# Patient Record
Sex: FEMALE | Race: WHITE
[De-identification: names, ages, dates, MRNs, and addresses within clinical notes are randomized per-mention and may not be internally consistent; named-entity substitution may affect disease eponyms.]

---

## 2018-09-27 ENCOUNTER — TRANSCRIPTION ENCOUNTER (OUTPATIENT)
Age: 83
End: 2018-09-27

## 2019-07-15 ENCOUNTER — INPATIENT (INPATIENT)
Facility: HOSPITAL | Age: 84
LOS: 6 days | Discharge: HOME HEALTH | End: 2019-07-22
Attending: INTERNAL MEDICINE | Admitting: INTERNAL MEDICINE
Payer: COMMERCIAL

## 2019-07-15 VITALS
OXYGEN SATURATION: 99 % | HEART RATE: 54 BPM | DIASTOLIC BLOOD PRESSURE: 90 MMHG | SYSTOLIC BLOOD PRESSURE: 106 MMHG | HEIGHT: 59 IN | TEMPERATURE: 98 F | WEIGHT: 100.09 LBS | RESPIRATION RATE: 18 BRPM

## 2019-07-15 DIAGNOSIS — Z95.0 PRESENCE OF CARDIAC PACEMAKER: Chronic | ICD-10-CM

## 2019-07-15 LAB
ADD ON TEST-SPECIMEN IN LAB: SIGNIFICANT CHANGE UP
ALBUMIN SERPL ELPH-MCNC: 3 G/DL — LOW (ref 3.3–5)
ALP SERPL-CCNC: 93 U/L — SIGNIFICANT CHANGE UP (ref 40–120)
ALT FLD-CCNC: 11 U/L — LOW (ref 12–78)
ANION GAP SERPL CALC-SCNC: 9 MMOL/L — SIGNIFICANT CHANGE UP (ref 5–17)
APTT BLD: 28.4 SEC — SIGNIFICANT CHANGE UP (ref 27.5–36.3)
AST SERPL-CCNC: 19 U/L — SIGNIFICANT CHANGE UP (ref 15–37)
BASOPHILS # BLD AUTO: 0.02 K/UL — SIGNIFICANT CHANGE UP (ref 0–0.2)
BASOPHILS NFR BLD AUTO: 0.4 % — SIGNIFICANT CHANGE UP (ref 0–2)
BILIRUB SERPL-MCNC: 0.5 MG/DL — SIGNIFICANT CHANGE UP (ref 0.2–1.2)
BUN SERPL-MCNC: 17 MG/DL — SIGNIFICANT CHANGE UP (ref 7–23)
CALCIUM SERPL-MCNC: 9 MG/DL — SIGNIFICANT CHANGE UP (ref 8.5–10.1)
CHLORIDE SERPL-SCNC: 109 MMOL/L — HIGH (ref 96–108)
CO2 SERPL-SCNC: 27 MMOL/L — SIGNIFICANT CHANGE UP (ref 22–31)
CREAT SERPL-MCNC: 1.04 MG/DL — SIGNIFICANT CHANGE UP (ref 0.5–1.3)
EOSINOPHIL # BLD AUTO: 0.1 K/UL — SIGNIFICANT CHANGE UP (ref 0–0.5)
EOSINOPHIL NFR BLD AUTO: 1.8 % — SIGNIFICANT CHANGE UP (ref 0–6)
GLUCOSE SERPL-MCNC: 120 MG/DL — HIGH (ref 70–99)
HCT VFR BLD CALC: 31.9 % — LOW (ref 34.5–45)
HGB BLD-MCNC: 9 G/DL — LOW (ref 11.5–15.5)
IMM GRANULOCYTES NFR BLD AUTO: 0.4 % — SIGNIFICANT CHANGE UP (ref 0–1.5)
INR BLD: 1.03 RATIO — SIGNIFICANT CHANGE UP (ref 0.88–1.16)
LYMPHOCYTES # BLD AUTO: 1.09 K/UL — SIGNIFICANT CHANGE UP (ref 1–3.3)
LYMPHOCYTES # BLD AUTO: 19.6 % — SIGNIFICANT CHANGE UP (ref 13–44)
MCHC RBC-ENTMCNC: 21.7 PG — LOW (ref 27–34)
MCHC RBC-ENTMCNC: 28.2 GM/DL — LOW (ref 32–36)
MCV RBC AUTO: 76.9 FL — LOW (ref 80–100)
MONOCYTES # BLD AUTO: 0.36 K/UL — SIGNIFICANT CHANGE UP (ref 0–0.9)
MONOCYTES NFR BLD AUTO: 6.5 % — SIGNIFICANT CHANGE UP (ref 2–14)
NEUTROPHILS # BLD AUTO: 3.97 K/UL — SIGNIFICANT CHANGE UP (ref 1.8–7.4)
NEUTROPHILS NFR BLD AUTO: 71.3 % — SIGNIFICANT CHANGE UP (ref 43–77)
NT-PROBNP SERPL-SCNC: HIGH PG/ML (ref 0–450)
PLATELET # BLD AUTO: 300 K/UL — SIGNIFICANT CHANGE UP (ref 150–400)
POTASSIUM SERPL-MCNC: 3.9 MMOL/L — SIGNIFICANT CHANGE UP (ref 3.5–5.3)
POTASSIUM SERPL-SCNC: 3.9 MMOL/L — SIGNIFICANT CHANGE UP (ref 3.5–5.3)
PROT SERPL-MCNC: 7.6 GM/DL — SIGNIFICANT CHANGE UP (ref 6–8.3)
PROTHROM AB SERPL-ACNC: 11.4 SEC — SIGNIFICANT CHANGE UP (ref 10–12.9)
RBC # BLD: 4.15 M/UL — SIGNIFICANT CHANGE UP (ref 3.8–5.2)
RBC # FLD: 16.6 % — HIGH (ref 10.3–14.5)
SODIUM SERPL-SCNC: 145 MMOL/L — SIGNIFICANT CHANGE UP (ref 135–145)
TROPONIN I SERPL-MCNC: 0.03 NG/ML — SIGNIFICANT CHANGE UP (ref 0.01–0.04)
TROPONIN I SERPL-MCNC: 0.04 NG/ML — SIGNIFICANT CHANGE UP (ref 0.01–0.04)
WBC # BLD: 5.56 K/UL — SIGNIFICANT CHANGE UP (ref 3.8–10.5)
WBC # FLD AUTO: 5.56 K/UL — SIGNIFICANT CHANGE UP (ref 3.8–10.5)

## 2019-07-15 PROCEDURE — 71045 X-RAY EXAM CHEST 1 VIEW: CPT | Mod: 26

## 2019-07-15 PROCEDURE — 93010 ELECTROCARDIOGRAM REPORT: CPT

## 2019-07-15 PROCEDURE — 99285 EMERGENCY DEPT VISIT HI MDM: CPT

## 2019-07-15 PROCEDURE — 71250 CT THORAX DX C-: CPT | Mod: 26

## 2019-07-15 RX ORDER — DIGOXIN 250 MCG
0.25 TABLET ORAL ONCE
Refills: 0 | Status: DISCONTINUED | OUTPATIENT
Start: 2019-07-15 | End: 2019-07-15

## 2019-07-15 RX ORDER — DIPHENHYDRAMINE HCL 50 MG
12.5 CAPSULE ORAL ONCE
Refills: 0 | Status: COMPLETED | OUTPATIENT
Start: 2019-07-15 | End: 2019-07-15

## 2019-07-15 RX ORDER — IPRATROPIUM BROMIDE 0.2 MG/ML
500 SOLUTION, NON-ORAL INHALATION EVERY 6 HOURS
Refills: 0 | Status: DISCONTINUED | OUTPATIENT
Start: 2019-07-15 | End: 2019-07-22

## 2019-07-15 RX ORDER — LEVALBUTEROL 1.25 MG/.5ML
0.63 SOLUTION, CONCENTRATE RESPIRATORY (INHALATION) EVERY 6 HOURS
Refills: 0 | Status: DISCONTINUED | OUTPATIENT
Start: 2019-07-15 | End: 2019-07-15

## 2019-07-15 RX ORDER — FUROSEMIDE 40 MG
40 TABLET ORAL EVERY 12 HOURS
Refills: 0 | Status: DISCONTINUED | OUTPATIENT
Start: 2019-07-15 | End: 2019-07-15

## 2019-07-15 RX ORDER — DILTIAZEM HCL 120 MG
5 CAPSULE, EXT RELEASE 24 HR ORAL ONCE
Refills: 0 | Status: COMPLETED | OUTPATIENT
Start: 2019-07-15 | End: 2019-07-15

## 2019-07-15 RX ORDER — IPRATROPIUM/ALBUTEROL SULFATE 18-103MCG
3 AEROSOL WITH ADAPTER (GRAM) INHALATION ONCE
Refills: 0 | Status: COMPLETED | OUTPATIENT
Start: 2019-07-15 | End: 2019-07-15

## 2019-07-15 RX ORDER — DIGOXIN 250 MCG
0.12 TABLET ORAL DAILY
Refills: 0 | Status: DISCONTINUED | OUTPATIENT
Start: 2019-07-15 | End: 2019-07-16

## 2019-07-15 RX ORDER — DIGOXIN 250 MCG
0.25 TABLET ORAL ONCE
Refills: 0 | Status: COMPLETED | OUTPATIENT
Start: 2019-07-15 | End: 2019-07-15

## 2019-07-15 RX ORDER — FUROSEMIDE 40 MG
40 TABLET ORAL ONCE
Refills: 0 | Status: COMPLETED | OUTPATIENT
Start: 2019-07-15 | End: 2019-07-15

## 2019-07-15 RX ORDER — SODIUM CHLORIDE 9 MG/ML
3 INJECTION INTRAMUSCULAR; INTRAVENOUS; SUBCUTANEOUS ONCE
Refills: 0 | Status: COMPLETED | OUTPATIENT
Start: 2019-07-15 | End: 2019-07-15

## 2019-07-15 RX ORDER — FUROSEMIDE 40 MG
40 TABLET ORAL EVERY 12 HOURS
Refills: 0 | Status: DISCONTINUED | OUTPATIENT
Start: 2019-07-15 | End: 2019-07-22

## 2019-07-15 RX ADMIN — SODIUM CHLORIDE 3 MILLILITER(S): 9 INJECTION INTRAMUSCULAR; INTRAVENOUS; SUBCUTANEOUS at 16:29

## 2019-07-15 RX ADMIN — Medication 3 MILLILITER(S): at 14:00

## 2019-07-15 RX ADMIN — Medication 0.25 MILLIGRAM(S): at 19:41

## 2019-07-15 RX ADMIN — Medication 12.5 MILLIGRAM(S): at 22:46

## 2019-07-15 RX ADMIN — Medication 500 MICROGRAM(S): at 21:37

## 2019-07-15 RX ADMIN — Medication 40 MILLIGRAM(S): at 15:20

## 2019-07-15 RX ADMIN — Medication 5 MILLIGRAM(S): at 17:33

## 2019-07-15 NOTE — ED ADULT NURSE NOTE - OBJECTIVE STATEMENT
Pt from home with baseline dementia brought in for difficulty breathing per family.  EKG done on arrival.   Cardiac and VS monitoring initiated.  22g PIV placed in RAC after multiple attempts.  Pt confused, uncooperative.  Pulse ox % on room air, nasal cannula placed.

## 2019-07-15 NOTE — ED ADULT NURSE NOTE - TEMPLATE
Message   Recorded as Task   Date: 03/02/2016 12:43 PM, Created By: Lali Walsh   Task Name: Follow Up   Assigned To: [facility],Team   Regarding Patient: Manny Jackson, Status: In Progress   Romelia Lawson - 02 Mar 1569 09:57 PM     TASK CREATED  S/P LESI L5-S1 ON 2/24/2016 W/ DR ROPER - NO F/U SCHEDULED   Dolores Howe - 04 Mar 2016 3:18 PM     TASK EDITED  s/w pt  today and she stated that she called yest with c/o pain and has received no relief with the epidural  She is taking the flexeril Qday, and the gabapentin 2 pills in am, 2 pills at lunch and 3 pills at bedtime  I reviewed the previous task which stated she could take the flexeril TID and no amrix  Just wanted to clarify that she can take the flexeril TID as needed for spasms  She states she will give the epidural another week but has no relief so far  Caden Roper - 04 Mar 2016 4:14 PM     TASK REPLIED TO: Previously Assigned To Karen Fonseca  Yes she can take it TID  We can schedule a f/u in 2 weeks since she's still have significant pain  Ed Nichole - 08 Mar 2016 12:56 PM     TASK REPLIED TO: Previously Assigned To [facility],Team  Spoke with pt and advised of the same  F/U ov scheduled with you 3-18-16  Pt asking is asking if there is another injection that you recommend? Or should she go back to see Dr Connolly Nearing? Caden Roper - 08 Mar 2016 2:41 PM     TASK REPLIED TO: Previously Assigned To Caden Roper  We can repeat LESI but it would 4 weeks after 1st injection  I would again prefer ov     Elinor Jose - 11 Mar 3310 3:23 AM     TASK EDITED  [facility] on [facility] - 11 Mar 4021 7:56 AM     TASK EDITED  Pt scheduled for OV        Signatures   Electronically signed by : Mark Walsh, ; Mar 11 2016  9:31AM EST                       (Author)
Respiratory

## 2019-07-15 NOTE — ED PROVIDER NOTE - PROGRESS NOTE DETAILS
will w/u for COPD/CHF. possible admission. ANDRES Reaves patient with large pleural effusion, CHF. Will admit. ANDRES Reaves

## 2019-07-15 NOTE — H&P ADULT - NSHPREVIEWOFSYSTEMS_GEN_ALL_CORE
Review of Systems:  CONSTITUTIONAL: No fevers or chills; Positive Weakness  EYES/ENT: No visual changes;  No vertigo or throat pain   NECK: No pain or stiffness  RESPIRATORY: No cough, wheezing, hemoptysis; Positive shortness of breath,   CARDIOVASCULAR: No chest pain or palpitations  GASTROINTESTINAL: No abdominal or epigastric pain. No nausea, vomiting, or hematemesis; No diarrhea or constipation.   GENITOURINARY: No dysuria, frequency or hematuria  NEUROLOGICAL: No numbness or weakness  SKIN: Positive for pruritis  All other review of systems is negative unless indicated above

## 2019-07-15 NOTE — H&P ADULT - NSHPPHYSICALEXAM_GEN_ALL_CORE
Vital Signs Last 24 Hrs  T(C): 36.4 (15 Jul 2019 12:59), Max: 36.4 (15 Jul 2019 12:59)  T(F): 97.6 (15 Jul 2019 12:59), Max: 97.6 (15 Jul 2019 12:59)  HR: 122 (15 Jul 2019 17:35) (54 - 122)  BP: 165/98 (15 Jul 2019 17:35) (106/90 - 165/98)  BP(mean): --  RR: 20 (15 Jul 2019 17:35) (18 - 24)  SpO2: 100% (15 Jul 2019 17:35) (96% - 100%)    General: Pt sitting upright in ED bed, no acute distress  Head- Atraumatic, normocephalic   Neurology: Alert, nonfocal,   HEENT- PERRLA, moist mucous membrane  Neck-supple, no JVD  Respiratory: Diminished air entry bilateral lung fields, no wheezing, rhonci or rales  CVS:  Irregularly irregular, tachycardic  Abdominal: Soft, NT, ND +BS,   Genitourinary- voiding, non palpable bladder  Extremities: 2+ Pitting edema bilateral lower extremities  Skin- multiple macules throughout body from scratching  Psychiatric- Poor insight and judgment  LN- no lymphadenopathy Vital Signs Last 24 Hrs  T(C): 36.4 (15 Jul 2019 12:59), Max: 36.4 (15 Jul 2019 12:59)  T(F): 97.6 (15 Jul 2019 12:59), Max: 97.6 (15 Jul 2019 12:59)  HR: 122 (15 Jul 2019 17:35) (54 - 122)  BP: 165/98 (15 Jul 2019 17:35) (106/90 - 165/98)  BP(mean): --  RR: 20 (15 Jul 2019 17:35) (18 - 24)  SpO2: 100% (15 Jul 2019 17:35) (96% - 100%)    General: Pt sitting upright in ED bed, no acute distress  Head- Atraumatic, normocephalic   Neurology: Alert, nonfocal,   HEENT- PERRLA, moist mucous membrane  Neck-supple, no JVD  Respiratory: Diminished air entry bilateral lung fields, no wheezing, rhonci or rales  CVS: Regular rhythm, tachycardic, no murmurs  Abdominal: Soft, NT, ND +BS,   Genitourinary- voiding, non palpable bladder  Extremities: 2+ Pitting edema bilateral lower extremities  Skin- multiple macules throughout body from scratching  Psychiatric- Poor insight and judgment  LN- no lymphadenopathy

## 2019-07-15 NOTE — H&P ADULT - HISTORY OF PRESENT ILLNESS
89 y/o F with PMH of asthma and PPM presents with c/o progressively worsening shortness of breath for past 2 days. History provided by 2 daughters via  phone service. Pt reportedly has severe asthma and was outside on Saturday for a birthday party. Pt does not do well in the heat and pt began complaining of worsening shortness of breath. Pt reportedly has had swelling of bilateral lower extremities, face and left breast. Pt has been seeing a dermatologist and allergist for many years for pruritic rash. Pt last saw her cardiologist of unknown name over 20 years ago for PPM. Daughter states that pt never needed a PPM and denies history of arrhythmias, AFIB, CHF. Daughter states that pt has no cardiac problems. Pt denies chest pain, nausea, vomiting, abdominal pain, diarrhea. 89 y/o F with PMH of asthma and PPM presents with c/o progressively worsening shortness of breath for past 2 days. History provided by 2 daughters via  phone service. Pt reportedly has severe asthma and was outside on Saturday for a birthday party. Pt does not do well in the heat and pt began complaining of worsening shortness of breath. Pt reportedly has had swelling of bilateral lower extremities, face and left breast. Pt has been seeing a dermatologist and allergist for many years for pruritic rash. Pt last saw her cardiologist couple months ago and states everything was normal. Pt reportedly had PPM placed over 20 years ago but daughter states it was never turned on as she didn't need one. Denies history of arrhythmias, AFIB, CHF. Daughter states that pt has no cardiac problems. Pt denies chest pain, nausea, vomiting, abdominal pain, diarrhea.    Daughters and patient are poor historians, unable to obtain accurate history due to discrepancies in reported history.

## 2019-07-15 NOTE — H&P ADULT - ASSESSMENT
89 y/o F with PMH of asthma, PPM presents with progressively worsening shortness of breath.    #) Acute respiratory failure 2/2 CHF: BNP 15k, no known hx of HF. Significant pleural effusions on CT and CXR. Hx of Asthma. Pt currently does not have a cardiologist. Troponin 0.035.  - Admit to Telemetry  - Lasix 40 mg IV qdaily  - Strict I&O  - Echocardiogram  - Trend troponins  - O2 NC  - Duo-Neb q6h  - Consult Cardiology, EP (Interrogate PM, not pacing currently, battery replacement)  - Consult Pulmonology: Pulmonary edema, large pleural effusions      #) AFIB RVR: Hx of PPM placed 20 years ago, has not followed up with Cardiologist since placement. ED administered Cardizem. Will also tx with diuretics which will likely improve rate.  - Rate control    #) Microcytic Anemia: Likely 2/2 KEHINDE  - Check Ferritin  - CBC in AM  - SCD for DVT ppx, hold A/C 89 y/o F with PMH of asthma, PPM presents with progressively worsening shortness of breath.    #) Acute respiratory failure 2/2 CHF: BNP 15k, no known hx of HF. Significant pleural effusions on CT and CXR. Hx of Asthma. Pt currently does not have a cardiologist. Troponin 0.035.  - Admit to Telemetry  - Lasix 40 mg IV qdaily  - Strict I&O  - Echocardiogram  - Trend troponins  - O2 NC  - Duo-Neb q6h  - Consult Cardiology, EP (Interrogate PM, not pacing currently, battery replacement)  - Consult Pulmonology: Pulmonary edema, large pleural effusions    #) AFIB RVR: Hx of PPM placed 20 years ago, has not followed up with Cardiologist since placement. ED administered Cardizem. Will also tx with diuretics which will likely improve rate. Goal HR < 110.  - Rate control: Start Metoprolol 25 mg BID    #) Microcytic Anemia: Likely 2/2 KEHINDE  - Check Ferritin  - CBC in AM  - SCD for DVT ppx, hold A/C 89 y/o F with PMH of asthma, PPM presents with progressively worsening shortness of breath.    #) Acute respiratory failure 2/2 CHF: BNP 15k, no known hx of HF. Significant pleural effusions on CT and CXR. Hx of Asthma. Pt currently does not have a cardiologist. Troponin 0.035.  - Admit to Telemetry  - Lasix 40 mg IV BID  - Strict I&O  - Echocardiogram  - Trend troponins  - O2 NC  - Duo-Neb q6h  - Consult Cardiology, EP (Interrogate PM, not pacing currently, battery replacement)  - Consult Pulmonology: Pulmonary edema, large pleural effusions    #) AFIB RVR: Hx of PPM placed 20 years ago, has not followed up with Cardiologist since placement. ED administered Cardizem. Will also tx with diuretics which will likely improve rate. Goal HR < 110.  - Will avoid initiating beta blockers in acute CHF setting.  - If no improvement with Diuresis, will start Digoxin.  - Continue home BP medications: Norvasc, Losartan    #) Microcytic Anemia: Likely 2/2 KEHINDE  - Check Ferritin, Iron, TIBC  - CBC in AM  - SCD for DVT ppx, hold A/C 89 y/o F with PMH of asthma, PPM presents with progressively worsening shortness of breath.    #) Acute respiratory failure 2/2 CHF: BNP 15k, no known hx of HF. Significant pleural effusions on CT and CXR. Hx of Asthma. Pt currently does not have a cardiologist. Troponin 0.035.  - Admit to Telemetry  - Lasix 40 mg IV BID  - Strict I&O  - Echocardiogram  - Trend troponins  - O2 NC  - Duo-Neb q6h  - Consult Cardiology, EP (Interrogate PM, not pacing currently, battery replacement)  - Consult Pulmonology: Pulmonary edema, large pleural effusions    #) Sinus Tachycardia: Hx of PPM placed 20 years ago. ED administered Cardizem. Will also tx with diuretics which will likely improve rate. Tachy likely 2/2 significant pleural effusion and hypervolemia.  - Will avoid initiating beta blockers in acute CHF setting.  - Will start Digoxin. Potassium level normal.  - Continue home BP medications: Norvasc, Losartan    #) Microcytic Anemia: Likely 2/2 KEHINDE  - Check Ferritin, Iron, TIBC  - CBC in AM  - SCD for DVT ppx, hold A/C

## 2019-07-15 NOTE — ED ADULT TRIAGE NOTE - ACCOMPANIED BY
Epidural    Patient location during procedure: OB   Reason for block: primary anesthetic   Diagnosis: iup   Start time: 8/2/2017 6:19 PM  Timeout: 8/2/2017 6:17 PM  End time: 8/3/2017 6:38 PM  Staffing  Anesthesiologist: MARIO CONLEY  Performed: anesthesiologist   Preanesthetic Checklist  Completed: patient identified, pre-op evaluation, timeout performed, IV checked, risks and benefits discussed, monitors and equipment checked, anesthesia consent given, hand hygiene performed and patient being monitored  Preparation  Patient position: sitting  Prep: Betadine  Patient monitoring: Pulse Ox and Blood Pressure  Epidural  Skin Anesthetic: lidocaine 1%  Skin Wheal: 3 mL  Administration type: continuous  Approach: midline  Interspace: L3-4  Injection technique: CHANDRA air  Needle and Epidural Catheter  Needle type: Tuohy   Needle gauge: 18  Needle length: 3.5 inches  Catheter type: multi-orifice  Catheter size: 20 G  Catheter at skin depth: 9 cm  Additional Documentation: incremental injection, negative aspiration for heme and CSF, no signs/symptoms of IV or SA injection, no paresthesia on injection, no significant pain on injection and no significant complaints from patient  Needle localization: anatomical landmarks  Medications:  Bolus administered: 12 mL of 0.2% ropivacaine  Volume per aspiration: 3 mL  Time between aspirations: 1 minutes  Assessment  Ease of block: easy  Patient's tolerance of the procedure: comfortable throughout block           EMT/paramedic

## 2019-07-15 NOTE — ED ADULT NURSE REASSESSMENT NOTE - NS ED NURSE REASSESS COMMENT FT1
Updated  regarding patient's HR, MD will place orders. Safety and comfort measures in place. Hourly rounding will be done on my time. Will continue to monitor.

## 2019-07-15 NOTE — ED ADULT TRIAGE NOTE - CHIEF COMPLAINT QUOTE
Pt BIBEMS with difficulty breathing, shortness of breath, yelling on stretcher with daughter in law at bedside who states she has dementia, speaks only [place]. Pt in mild-moderate respiratory distress and transferred to bed 16 for further assessment.

## 2019-07-16 DIAGNOSIS — J90 PLEURAL EFFUSION, NOT ELSEWHERE CLASSIFIED: ICD-10-CM

## 2019-07-16 DIAGNOSIS — J45.909 UNSPECIFIED ASTHMA, UNCOMPLICATED: ICD-10-CM

## 2019-07-16 DIAGNOSIS — R06.02 SHORTNESS OF BREATH: ICD-10-CM

## 2019-07-16 DIAGNOSIS — I50.9 HEART FAILURE, UNSPECIFIED: ICD-10-CM

## 2019-07-16 DIAGNOSIS — Z95.0 PRESENCE OF CARDIAC PACEMAKER: ICD-10-CM

## 2019-07-16 LAB
ABO RH CONFIRMATION: SIGNIFICANT CHANGE UP
ADD ON TEST-SPECIMEN IN LAB: SIGNIFICANT CHANGE UP
ANION GAP SERPL CALC-SCNC: 7 MMOL/L — SIGNIFICANT CHANGE UP (ref 5–17)
BLD GP AB SCN SERPL QL: SIGNIFICANT CHANGE UP
BUN SERPL-MCNC: 15 MG/DL — SIGNIFICANT CHANGE UP (ref 7–23)
CALCIUM SERPL-MCNC: 8.1 MG/DL — LOW (ref 8.5–10.1)
CHLORIDE SERPL-SCNC: 108 MMOL/L — SIGNIFICANT CHANGE UP (ref 96–108)
CO2 SERPL-SCNC: 28 MMOL/L — SIGNIFICANT CHANGE UP (ref 22–31)
CREAT SERPL-MCNC: 0.86 MG/DL — SIGNIFICANT CHANGE UP (ref 0.5–1.3)
FERRITIN SERPL-MCNC: 18 NG/ML — SIGNIFICANT CHANGE UP (ref 15–150)
FOLATE SERPL-MCNC: >20 NG/ML — SIGNIFICANT CHANGE UP
GLUCOSE SERPL-MCNC: 84 MG/DL — SIGNIFICANT CHANGE UP (ref 70–99)
HAPTOGLOB SERPL-MCNC: 51 MG/DL — SIGNIFICANT CHANGE UP (ref 34–200)
HCT VFR BLD CALC: 22.9 % — LOW (ref 34.5–45)
HCT VFR BLD CALC: 22.9 % — LOW (ref 34.5–45)
HCT VFR BLD CALC: 29.4 % — LOW (ref 34.5–45)
HGB BLD-MCNC: 6.8 G/DL — CRITICAL LOW (ref 11.5–15.5)
HGB BLD-MCNC: 6.8 G/DL — CRITICAL LOW (ref 11.5–15.5)
HGB BLD-MCNC: 9 G/DL — LOW (ref 11.5–15.5)
IRON SATN MFR SERPL: 13 UG/DL — LOW (ref 30–160)
IRON SATN MFR SERPL: 4 % — LOW (ref 14–50)
MCHC RBC-ENTMCNC: 21.7 PG — LOW (ref 27–34)
MCHC RBC-ENTMCNC: 29.7 GM/DL — LOW (ref 32–36)
MCV RBC AUTO: 73.2 FL — LOW (ref 80–100)
PLATELET # BLD AUTO: 281 K/UL — SIGNIFICANT CHANGE UP (ref 150–400)
POTASSIUM SERPL-MCNC: 3.5 MMOL/L — SIGNIFICANT CHANGE UP (ref 3.5–5.3)
POTASSIUM SERPL-SCNC: 3.5 MMOL/L — SIGNIFICANT CHANGE UP (ref 3.5–5.3)
RBC # BLD: 3.13 M/UL — LOW (ref 3.8–5.2)
RBC # FLD: 16 % — HIGH (ref 10.3–14.5)
SODIUM SERPL-SCNC: 143 MMOL/L — SIGNIFICANT CHANGE UP (ref 135–145)
TIBC SERPL-MCNC: 306 UG/DL — SIGNIFICANT CHANGE UP (ref 220–430)
TROPONIN I SERPL-MCNC: 0.03 NG/ML — SIGNIFICANT CHANGE UP (ref 0.01–0.04)
TSH SERPL-MCNC: 4.04 UU/ML — SIGNIFICANT CHANGE UP (ref 0.34–4.82)
TYPE + AB SCN PNL BLD: SIGNIFICANT CHANGE UP
UIBC SERPL-MCNC: 293 UG/DL — SIGNIFICANT CHANGE UP (ref 110–370)
VIT B12 SERPL-MCNC: 373 PG/ML — SIGNIFICANT CHANGE UP (ref 232–1245)
WBC # BLD: 6.12 K/UL — SIGNIFICANT CHANGE UP (ref 3.8–10.5)
WBC # FLD AUTO: 6.12 K/UL — SIGNIFICANT CHANGE UP (ref 3.8–10.5)

## 2019-07-16 PROCEDURE — 99223 1ST HOSP IP/OBS HIGH 75: CPT

## 2019-07-16 PROCEDURE — 93279 PRGRMG DEV EVAL PM/LDLS PM: CPT | Mod: 26

## 2019-07-16 PROCEDURE — 74176 CT ABD & PELVIS W/O CONTRAST: CPT | Mod: 26

## 2019-07-16 PROCEDURE — 93306 TTE W/DOPPLER COMPLETE: CPT | Mod: 26

## 2019-07-16 RX ORDER — TRAZODONE HCL 50 MG
50 TABLET ORAL AT BEDTIME
Refills: 0 | Status: DISCONTINUED | OUTPATIENT
Start: 2019-07-16 | End: 2019-07-22

## 2019-07-16 RX ORDER — DIPHENHYDRAMINE HCL 50 MG
12.5 CAPSULE ORAL ONCE
Refills: 0 | Status: COMPLETED | OUTPATIENT
Start: 2019-07-16 | End: 2019-07-16

## 2019-07-16 RX ORDER — HYDROCORTISONE 1 %
1 OINTMENT (GRAM) TOPICAL ONCE
Refills: 0 | Status: COMPLETED | OUTPATIENT
Start: 2019-07-16 | End: 2019-07-16

## 2019-07-16 RX ORDER — HYDROXYZINE HCL 10 MG
10 TABLET ORAL
Refills: 0 | Status: DISCONTINUED | OUTPATIENT
Start: 2019-07-16 | End: 2019-07-16

## 2019-07-16 RX ORDER — PANTOPRAZOLE SODIUM 20 MG/1
40 TABLET, DELAYED RELEASE ORAL
Refills: 0 | Status: DISCONTINUED | OUTPATIENT
Start: 2019-07-16 | End: 2019-07-22

## 2019-07-16 RX ORDER — METOPROLOL TARTRATE 50 MG
12.5 TABLET ORAL EVERY 12 HOURS
Refills: 0 | Status: DISCONTINUED | OUTPATIENT
Start: 2019-07-16 | End: 2019-07-17

## 2019-07-16 RX ORDER — SODIUM FERRIC GLUCONAT/SUCROSE 62.5MG/5ML
125 AMPUL (ML) INTRAVENOUS
Refills: 0 | Status: DISCONTINUED | OUTPATIENT
Start: 2019-07-16 | End: 2019-07-22

## 2019-07-16 RX ORDER — MONTELUKAST 4 MG/1
10 TABLET, CHEWABLE ORAL DAILY
Refills: 0 | Status: DISCONTINUED | OUTPATIENT
Start: 2019-07-16 | End: 2019-07-22

## 2019-07-16 RX ADMIN — Medication 600 MILLIGRAM(S): at 17:55

## 2019-07-16 RX ADMIN — Medication 40 MILLIGRAM(S): at 17:55

## 2019-07-16 RX ADMIN — Medication 12.5 MILLIGRAM(S): at 03:25

## 2019-07-16 RX ADMIN — Medication 500 MICROGRAM(S): at 08:35

## 2019-07-16 RX ADMIN — Medication 500 MICROGRAM(S): at 13:17

## 2019-07-16 RX ADMIN — Medication 1 APPLICATION(S): at 04:52

## 2019-07-16 RX ADMIN — Medication 500 MICROGRAM(S): at 20:33

## 2019-07-16 RX ADMIN — Medication 0.12 MILLIGRAM(S): at 05:02

## 2019-07-16 RX ADMIN — PANTOPRAZOLE SODIUM 40 MILLIGRAM(S): 20 TABLET, DELAYED RELEASE ORAL at 21:21

## 2019-07-16 RX ADMIN — Medication 12.5 MILLIGRAM(S): at 21:22

## 2019-07-16 RX ADMIN — Medication 500 MICROGRAM(S): at 01:43

## 2019-07-16 RX ADMIN — PANTOPRAZOLE SODIUM 40 MILLIGRAM(S): 20 TABLET, DELAYED RELEASE ORAL at 14:33

## 2019-07-16 RX ADMIN — Medication 50 MILLIGRAM(S): at 21:22

## 2019-07-16 RX ADMIN — Medication 40 MILLIGRAM(S): at 05:02

## 2019-07-16 RX ADMIN — Medication 12.5 MILLIGRAM(S): at 14:34

## 2019-07-16 RX ADMIN — Medication 110 MILLIGRAM(S): at 20:38

## 2019-07-16 RX ADMIN — MONTELUKAST 10 MILLIGRAM(S): 4 TABLET, CHEWABLE ORAL at 21:22

## 2019-07-16 NOTE — DIETITIAN INITIAL EVALUATION ADULT. - PHYSICAL APPEARANCE
other (specify)/underweight NFPE: Severe muscle wasting in temple, clavicle, Deltoid, interosseous, calves and quads. Pt with severe fat wasting in ribs, occular and  triceps.

## 2019-07-16 NOTE — DIETITIAN INITIAL EVALUATION ADULT. - PERTINENT LABORATORY DATA
07-16 Na143 mmol/L Glu 84 mg/dL K+ 3.5 mmol/L Cr  0.86 mg/dL BUN 15 mg/dL Phos n/a   Alb n/a   PAB n/a

## 2019-07-16 NOTE — PHYSICAL THERAPY INITIAL EVALUATION ADULT - AMBULATION SKILLS, REHAB EVAL
unable to perform/As per dtr Jazzy pt has stopped amb since couple months, pt does only transfers with 2person assist from bed to  and rw

## 2019-07-16 NOTE — PHYSICAL THERAPY INITIAL EVALUATION ADULT - FOLLOWS COMMANDS/ANSWERS QUESTIONS, REHAB EVAL
able to follow single-step instructions able to follow single-step instructions/unable to follow multi-step instructions/unable to answer questions

## 2019-07-16 NOTE — DIETITIAN INITIAL EVALUATION ADULT. - DIET TYPE
Diet, Dysphagia 2 Mechanical Soft-Thin Liquids:   DASH/TLC {Sodium & Cholesterol Restricted} (DASH)  Low Sodium (07-15-19 @ 15:39) supplement (specify)/DASH/TLC (sodium and cholesterol restricted diet)/Ensure Enlive BID

## 2019-07-16 NOTE — CONSULT NOTE ADULT - PROBLEM SELECTOR RECOMMENDATION 2
moderate to large left pleural , small right pleural effusion ? diastolic CHF vs  will give diuretic , follow up CXR

## 2019-07-16 NOTE — DIETITIAN INITIAL EVALUATION ADULT. - ENERGY INTAKE
Pt's intake is difficult to assess. Pt's niece states that pt eats very well and usually eats three meals a day with snacks and good portions. Pt is noted as underweight and has clear signs of muscle and fat wasting. Pt likely not meeting needs. Of not niece blamed weight loss on poor sleep.

## 2019-07-16 NOTE — PHYSICAL THERAPY INITIAL EVALUATION ADULT - PLANNED THERAPY INTERVENTIONS, PT EVAL
transfer training/strengthening/gait training/bed mobility training/wheelchair management/propulsion training

## 2019-07-16 NOTE — DIETITIAN INITIAL EVALUATION ADULT. - REASON INDICATOR FOR ASSESSMENT
Heart Failure Diet educations    Interpretive service used ID # [id number]- Call dropped multiple times due to poor service on interpretive services part. Most information provided

## 2019-07-16 NOTE — CONSULT NOTE ADULT - ASSESSMENT
L greater than R pleural effusions secondary to CHF.  NC O2.  Note decrease in Hb.   Would hold on thoracentesis at this time.     h.o. asthma. not wheezing.  Montelukast, ipratropium nebs.     h.o. PM placement.

## 2019-07-16 NOTE — CHART NOTE - NSCHARTNOTEFT_GEN_A_CORE
Upon Nutritional Assessment by the Registered Dietitian your patient was determined to meet criteria / has evidence of the following diagnosis/diagnoses:          [ ]  Mild Protein Calorie Malnutrition        [ ]  Moderate Protein Calorie Malnutrition        [x] Severe Protein Calorie Malnutrition        [ ] Unspecified Protein Calorie Malnutrition        [x] Underweight / BMI <19        [ ] Morbid Obesity / BMI > 40      Findings as based on:  •  Comprehensive nutrition assessment and consultation  •  Calorie counts (nutrient intake analysis)  •  Food acceptance and intake status from observations by staff  •  Follow up  •  Patient education  •  Intervention secondary to interdisciplinary rounds  •   concerns    Nutrition Diagnostic Terminology #1 Malnutrition...     Malnutrition Severe protein-calorie malnutrition in the context of chronic illness.     Etiology Poor PO intake 2/2 CHF.     Signs/Symptoms Severe muscle and fat wasting.     Goal/Expected Outcome Pt will complete > 75% at each meal and drink supplement.        Treatment:    The following diet has been recommended:  -Encourage Po intake  -Monitor weight  -Ensure enlive BID    PROVIDER Section:     By signing this assessment you are acknowledging and agree with the diagnosis/diagnoses assigned by the Registered Dietitian    Comments:

## 2019-07-16 NOTE — PHYSICAL THERAPY INITIAL EVALUATION ADULT - CRITERIA FOR SKILLED THERAPEUTIC INTERVENTIONS
therapy frequency/impairments found/rehab potential/functional limitations in following categories/predicted duration of therapy intervention/anticipated equipment needs at discharge/risk reduction/prevention/anticipated discharge recommendation

## 2019-07-16 NOTE — DIETITIAN INITIAL EVALUATION ADULT. - OTHER INFO
89 y/o F with PMH of asthma and PPM presents with c/o progressively worsening shortness of breath for past 2 days. History provided by 2 daughters via  phone service. Pt reportedly has severe asthma and was outside on Saturday for a birthday party. Pt does not do well in the heat and pt began complaining of worsening shortness of breath. Pt reportedly has had swelling of bilateral lower extremities, face and left breast. Pt has been seeing a dermatologist and allergist for many years for pruritic rash. Pt last saw her cardiologist couple months ago and states everything was normal.    Pt's niece provided information on diet h/x. Niece states pt has a great appetite and typically eats [place] food notes pt eats three times a day. Niece states she thinks pt has lived so long due to good intake. When probed niece states pt has lost a lot of weight over the past years. Pt noted as underweight based on BMI. NFPE noted below but clear signs of muscle and fat wasting. Information slightly conflicting.

## 2019-07-16 NOTE — DIETITIAN INITIAL EVALUATION ADULT. - PERTINENT MEDS FT
MEDICATIONS  (STANDING):  furosemide   Injectable 40 milliGRAM(s) IV Push every 12 hours  guaiFENesin  milliGRAM(s) Oral every 12 hours  hydroxyzine Hcl 10 milliGRAM(s) 10 milliGRAM(s) Oral two times a day  ipratropium    for Nebulization 500 MICROGram(s) Nebulizer every 6 hours  metoprolol tartrate 12.5 milliGRAM(s) Oral every 12 hours  montelukast 10 milliGRAM(s) Oral daily  pantoprazole  Injectable 40 milliGRAM(s) IV Push two times a day  traZODone 50 milliGRAM(s) Oral at bedtime    MEDICATIONS  (PRN):

## 2019-07-16 NOTE — CONSULT NOTE ADULT - PROBLEM SELECTOR RECOMMENDATION 3
hx of PPM , appears ? vasovagal episode vs pause , never been used based on interrogations over long period , last 2 years ago ,

## 2019-07-16 NOTE — DIETITIAN INITIAL EVALUATION ADULT. - ADD RECOMMEND
Recommend Ensure enlive BID, Encourage PO intake, Monitor wt. Will continue ot monitor pt's nutritional status

## 2019-07-16 NOTE — PHYSICAL THERAPY INITIAL EVALUATION ADULT - PERTINENT HX OF CURRENT PROBLEM, REHAB EVAL
Pt presents with c/o progressively worsening shortness of breath for past 2 days. Pt reportedly has severe asthma and was outside on Saturday for a birthday party. Pt does not do well in the heat and pt began complaining of worsening shortness of breath. Pt reportedly has had swelling of bilateral lower extremities, face and left breast. Pt has been seeing a dermatologist and allergist for many years for pruritic rash.,

## 2019-07-16 NOTE — PHYSICAL THERAPY INITIAL EVALUATION ADULT - IMPAIRMENTS FOUND, PT EVAL
gross motor/muscle strength/arousal, attention, and cognition/cognitive impairment/gait, locomotion, and balance

## 2019-07-16 NOTE — CONSULT NOTE ADULT - PROBLEM SELECTOR RECOMMENDATION 4
acute on chronic diastolic heart failure ,tachycardia on EKG possible due to anxiety and CHF , anemia? low dose BB as she is not wheezing ,

## 2019-07-16 NOTE — CONSULT NOTE ADULT - PROBLEM SELECTOR RECOMMENDATION 9
possibly multifactorial  bilateral asymmetric pleural effusion left more than right ,? CHF  ? asthma ,  anemia .    continue IV DIuresis , singulair ,  Transfuse  , anemia work up   echo showed normal LVEF , LVH

## 2019-07-17 LAB
ALBUMIN SERPL ELPH-MCNC: 2.1 G/DL — LOW (ref 3.3–5)
ANION GAP SERPL CALC-SCNC: 5 MMOL/L — SIGNIFICANT CHANGE UP (ref 5–17)
BUN SERPL-MCNC: 13 MG/DL — SIGNIFICANT CHANGE UP (ref 7–23)
CALCIUM SERPL-MCNC: 7.6 MG/DL — LOW (ref 8.5–10.1)
CHLORIDE SERPL-SCNC: 107 MMOL/L — SIGNIFICANT CHANGE UP (ref 96–108)
CO2 SERPL-SCNC: 33 MMOL/L — HIGH (ref 22–31)
CREAT SERPL-MCNC: 0.84 MG/DL — SIGNIFICANT CHANGE UP (ref 0.5–1.3)
GLUCOSE SERPL-MCNC: 77 MG/DL — SIGNIFICANT CHANGE UP (ref 70–99)
HCT VFR BLD CALC: 27.1 % — LOW (ref 34.5–45)
HGB BLD-MCNC: 8.4 G/DL — LOW (ref 11.5–15.5)
MCHC RBC-ENTMCNC: 22.5 PG — LOW (ref 27–34)
MCHC RBC-ENTMCNC: 31 GM/DL — LOW (ref 32–36)
MCV RBC AUTO: 72.5 FL — LOW (ref 80–100)
PHOSPHATE SERPL-MCNC: 3.1 MG/DL — SIGNIFICANT CHANGE UP (ref 2.5–4.5)
PLATELET # BLD AUTO: 257 K/UL — SIGNIFICANT CHANGE UP (ref 150–400)
POTASSIUM SERPL-MCNC: 3.2 MMOL/L — LOW (ref 3.5–5.3)
POTASSIUM SERPL-SCNC: 3.2 MMOL/L — LOW (ref 3.5–5.3)
RBC # BLD: 3.74 M/UL — LOW (ref 3.8–5.2)
RBC # FLD: 16.5 % — HIGH (ref 10.3–14.5)
SODIUM SERPL-SCNC: 145 MMOL/L — SIGNIFICANT CHANGE UP (ref 135–145)
WBC # BLD: 5.64 K/UL — SIGNIFICANT CHANGE UP (ref 3.8–10.5)
WBC # FLD AUTO: 5.64 K/UL — SIGNIFICANT CHANGE UP (ref 3.8–10.5)

## 2019-07-17 PROCEDURE — 99233 SBSQ HOSP IP/OBS HIGH 50: CPT

## 2019-07-17 RX ORDER — LOSARTAN POTASSIUM 100 MG/1
1 TABLET, FILM COATED ORAL
Qty: 0 | Refills: 0 | DISCHARGE

## 2019-07-17 RX ORDER — MOMETASONE FUROATE 1 MG/G
1 CREAM TOPICAL
Qty: 0 | Refills: 0 | DISCHARGE

## 2019-07-17 RX ORDER — POTASSIUM CHLORIDE 20 MEQ
40 PACKET (EA) ORAL EVERY 4 HOURS
Refills: 0 | Status: COMPLETED | OUTPATIENT
Start: 2019-07-17 | End: 2019-07-17

## 2019-07-17 RX ORDER — AMLODIPINE BESYLATE 2.5 MG/1
1 TABLET ORAL
Qty: 0 | Refills: 0 | DISCHARGE

## 2019-07-17 RX ORDER — QUETIAPINE FUMARATE 200 MG/1
1 TABLET, FILM COATED ORAL
Qty: 0 | Refills: 0 | DISCHARGE

## 2019-07-17 RX ORDER — METOPROLOL TARTRATE 50 MG
25 TABLET ORAL
Refills: 0 | Status: DISCONTINUED | OUTPATIENT
Start: 2019-07-17 | End: 2019-07-22

## 2019-07-17 RX ADMIN — Medication 25 MILLIGRAM(S): at 18:09

## 2019-07-17 RX ADMIN — Medication 12.5 MILLIGRAM(S): at 05:06

## 2019-07-17 RX ADMIN — Medication 40 MILLIGRAM(S): at 05:05

## 2019-07-17 RX ADMIN — PANTOPRAZOLE SODIUM 40 MILLIGRAM(S): 20 TABLET, DELAYED RELEASE ORAL at 18:09

## 2019-07-17 RX ADMIN — PANTOPRAZOLE SODIUM 40 MILLIGRAM(S): 20 TABLET, DELAYED RELEASE ORAL at 05:05

## 2019-07-17 RX ADMIN — Medication 40 MILLIEQUIVALENT(S): at 18:04

## 2019-07-17 RX ADMIN — MONTELUKAST 10 MILLIGRAM(S): 4 TABLET, CHEWABLE ORAL at 22:16

## 2019-07-17 RX ADMIN — Medication 500 MICROGRAM(S): at 13:46

## 2019-07-17 RX ADMIN — Medication 50 MILLIGRAM(S): at 22:16

## 2019-07-17 RX ADMIN — Medication 40 MILLIEQUIVALENT(S): at 12:31

## 2019-07-17 RX ADMIN — Medication 600 MILLIGRAM(S): at 05:05

## 2019-07-17 RX ADMIN — Medication 110 MILLIGRAM(S): at 21:19

## 2019-07-17 RX ADMIN — Medication 40 MILLIGRAM(S): at 18:09

## 2019-07-17 RX ADMIN — Medication 500 MICROGRAM(S): at 20:03

## 2019-07-17 RX ADMIN — Medication 500 MICROGRAM(S): at 07:54

## 2019-07-17 RX ADMIN — Medication 600 MILLIGRAM(S): at 18:04

## 2019-07-17 NOTE — PROVIDER CONTACT NOTE (OTHER) - SITUATION
Consult left with Dr. Mukherjee's answering service.
Consult left with Dr. Sanches's answering service.
spoke to  [id number] service is aware
spoke with Carlyn. aware of consult
spoke with Jonathan in the answering service. aware of consult

## 2019-07-17 NOTE — PROGRESS NOTE ADULT - PROBLEM SELECTOR PLAN 1
Possibly related to diastolic failure. Underlying anemia now improved with transfusion. Follow CBC. Hospitalist evaluation. Continue diuresis. Follow labs.

## 2019-07-17 NOTE — CHART NOTE - NSCHARTNOTEFT_GEN_A_CORE
Patient brought to [facility] for diagnostic and therapeutic L thoracentesis. Despite our best efforts, the patient would no cooperate for the procedure, posing a harm to herself and others with her behavior.     Procedure terminates without thoracentesis.     d/w Dr. Perez and Dr. Dodson

## 2019-07-18 LAB
ANION GAP SERPL CALC-SCNC: 7 MMOL/L — SIGNIFICANT CHANGE UP (ref 5–17)
BUN SERPL-MCNC: 12 MG/DL — SIGNIFICANT CHANGE UP (ref 7–23)
CALCIUM SERPL-MCNC: 8.1 MG/DL — LOW (ref 8.5–10.1)
CHLORIDE SERPL-SCNC: 106 MMOL/L — SIGNIFICANT CHANGE UP (ref 96–108)
CO2 SERPL-SCNC: 31 MMOL/L — SIGNIFICANT CHANGE UP (ref 22–31)
CREAT SERPL-MCNC: 0.88 MG/DL — SIGNIFICANT CHANGE UP (ref 0.5–1.3)
GLUCOSE SERPL-MCNC: 78 MG/DL — SIGNIFICANT CHANGE UP (ref 70–99)
HCT VFR BLD CALC: 27.9 % — LOW (ref 34.5–45)
HGB BLD-MCNC: 8.5 G/DL — LOW (ref 11.5–15.5)
MCHC RBC-ENTMCNC: 22.3 PG — LOW (ref 27–34)
MCHC RBC-ENTMCNC: 30.5 GM/DL — LOW (ref 32–36)
MCV RBC AUTO: 73 FL — LOW (ref 80–100)
OB PNL STL: NEGATIVE — SIGNIFICANT CHANGE UP
PLATELET # BLD AUTO: 257 K/UL — SIGNIFICANT CHANGE UP (ref 150–400)
POTASSIUM SERPL-MCNC: 3.8 MMOL/L — SIGNIFICANT CHANGE UP (ref 3.5–5.3)
POTASSIUM SERPL-SCNC: 3.8 MMOL/L — SIGNIFICANT CHANGE UP (ref 3.5–5.3)
RBC # BLD: 3.82 M/UL — SIGNIFICANT CHANGE UP (ref 3.8–5.2)
RBC # FLD: 16.9 % — HIGH (ref 10.3–14.5)
SODIUM SERPL-SCNC: 144 MMOL/L — SIGNIFICANT CHANGE UP (ref 135–145)
WBC # BLD: 6.23 K/UL — SIGNIFICANT CHANGE UP (ref 3.8–10.5)
WBC # FLD AUTO: 6.23 K/UL — SIGNIFICANT CHANGE UP (ref 3.8–10.5)

## 2019-07-18 PROCEDURE — 99233 SBSQ HOSP IP/OBS HIGH 50: CPT

## 2019-07-18 RX ORDER — POLYETHYLENE GLYCOL 3350 17 G/17G
17 POWDER, FOR SOLUTION ORAL DAILY
Refills: 0 | Status: DISCONTINUED | OUTPATIENT
Start: 2019-07-18 | End: 2019-07-22

## 2019-07-18 RX ORDER — SOD,AMMONIUM,POTASSIUM LACTATE
1 CREAM (GRAM) TOPICAL
Refills: 0 | Status: DISCONTINUED | OUTPATIENT
Start: 2019-07-18 | End: 2019-07-22

## 2019-07-18 RX ORDER — PREGABALIN 225 MG/1
1000 CAPSULE ORAL DAILY
Refills: 0 | Status: DISCONTINUED | OUTPATIENT
Start: 2019-07-18 | End: 2019-07-22

## 2019-07-18 RX ORDER — SENNA PLUS 8.6 MG/1
2 TABLET ORAL AT BEDTIME
Refills: 0 | Status: DISCONTINUED | OUTPATIENT
Start: 2019-07-18 | End: 2019-07-22

## 2019-07-18 RX ADMIN — Medication 1 APPLICATION(S): at 18:39

## 2019-07-18 RX ADMIN — Medication 600 MILLIGRAM(S): at 05:52

## 2019-07-18 RX ADMIN — PANTOPRAZOLE SODIUM 40 MILLIGRAM(S): 20 TABLET, DELAYED RELEASE ORAL at 22:03

## 2019-07-18 RX ADMIN — Medication 25 MILLIGRAM(S): at 05:52

## 2019-07-18 RX ADMIN — Medication 500 MICROGRAM(S): at 20:38

## 2019-07-18 RX ADMIN — Medication 600 MILLIGRAM(S): at 18:39

## 2019-07-18 RX ADMIN — SENNA PLUS 2 TABLET(S): 8.6 TABLET ORAL at 22:03

## 2019-07-18 RX ADMIN — Medication 50 MILLIGRAM(S): at 22:03

## 2019-07-18 RX ADMIN — Medication 110 MILLIGRAM(S): at 22:03

## 2019-07-18 RX ADMIN — Medication 40 MILLIGRAM(S): at 18:39

## 2019-07-18 RX ADMIN — Medication 25 MILLIGRAM(S): at 18:40

## 2019-07-18 RX ADMIN — Medication 500 MICROGRAM(S): at 08:42

## 2019-07-18 RX ADMIN — Medication 40 MILLIGRAM(S): at 12:12

## 2019-07-18 RX ADMIN — PANTOPRAZOLE SODIUM 40 MILLIGRAM(S): 20 TABLET, DELAYED RELEASE ORAL at 12:13

## 2019-07-18 RX ADMIN — Medication 500 MICROGRAM(S): at 13:48

## 2019-07-18 RX ADMIN — MONTELUKAST 10 MILLIGRAM(S): 4 TABLET, CHEWABLE ORAL at 12:13

## 2019-07-18 NOTE — PROGRESS NOTE ADULT - PROBLEM SELECTOR PLAN 1
Possibly related to diastolic failure. Underlying anemia now improved . Follow CBC. Hospitalist evaluation. Ambulate as tolerated.

## 2019-07-18 NOTE — CONSULT NOTE ADULT - ASSESSMENT
89F with significant microcytic anemia, without overt bleeding (hgb 6.8).  Poor appetite and weight loss. Negative noncontrast CT a/p.  Admitted also with pleural effusion, diastolic CHF.    Recommend:  -trend CBC post transfusion  -check FOBT  -management of cardiopulmonary issues per respective services  -consider evaluation with at least EGD but patient currently not optimized for endoscopic testing; would probably also need colonoscopy to fully eval anemia and rule out colon cancer  -address GOC given frailty and multiple issues, may wish to favor conservative measures such as iron supplementation instead of endoscopy  -d/w daughter at bedside  -will follow.

## 2019-07-18 NOTE — CONSULT NOTE ADULT - SUBJECTIVE AND OBJECTIVE BOX
ppor historian  history from chart        CHIEF COMPLAINT:    HPI:  89 y/o F with PMH of asthma HTN and ? tachyarrhythmia  PPM for ?presents with c/o progressively worsening shortness of breath for past 2 days. History provided by daughter at bedside  Pt reportedly has severe asthma and was outside on Saturday for a birthday party in wheel chair . Pt does not do well in the heat and pt began complaining of worsening shortness of breath. Pt reportedly has had swelling of bilateral lower extremities, face and left breast. Pt has been seeing a dermatologist and allergist for many years for pruritic rash. Pt last saw her cardiologist couple months ago and states everything was normal. Denies history of arrhythmias, AFIB, CHF. Daughter states that pt has no cardiac problems. Pt denies chest pain, nausea, vomiting, abdominal pain, diarrhea.    Daughters and patient are poor historians, unable to obtain accurate history due to discrepancies in reported history.  , patient is comfortable in bed , dropped her hemoglobin significantly , scheduled to receive  PRBC       PAST MEDICAL & SURGICAL HISTORY:  Asthma  ? PPM after syncopal episode ? appears vasovagal   History of permanent cardiac pacemaker placement      Allergies    aspirin (Other (Severe))    Intolerances        SOCIAL HISTORY: non smoker     FAMILY HISTORY: no hx of cad as per family       MEDICATIONS:  MEDICATIONS  (STANDING):  digoxin     Tablet 0.125 milliGRAM(s) Oral daily  furosemide   Injectable 40 milliGRAM(s) IV Push every 12 hours  guaiFENesin  milliGRAM(s) Oral every 12 hours  ipratropium    for Nebulization 500 MICROGram(s) Nebulizer every 6 hours    MEDICATIONS  (PRN):      REVIEW OF SYSTEMS:    limited due to forgetfull   All other review of systems is negative unless indicated above    Vital Signs Last 24 Hrs  T(C): 36.4 (16 Jul 2019 04:55), Max: 36.7 (15 Jul 2019 19:11)  T(F): 97.6 (16 Jul 2019 04:55), Max: 98.1 (15 Jul 2019 21:16)  HR: 100 (16 Jul 2019 04:55) (54 - 122)  BP: 116/62 (16 Jul 2019 04:55) (106/90 - 165/98)  BP(mean): --  RR: 19 (16 Jul 2019 04:55) (18 - 24)  SpO2: 90% (16 Jul 2019 04:55) (90% - 100%)    I&O's Summary    15 Jul 2019 07:01  -  16 Jul 2019 07:00  --------------------------------------------------------  IN: 0 mL / OUT: 600 mL / NET: -600 mL        PHYSICAL EXAM:    Constitutional: NAD, awake and alert,  HEENT: PERR, EOMI,  No oral cyananosis.  Neck:  supple,  No JVD  Respiratory: Breath sounds are clear bilaterally, No wheezing, rales or rhonchi  Cardiovascular: S1 and S2, regular rate and rhythm, esM   Gastrointestinal: Bowel Sounds present, soft, nontender.   Extremities: MILD lE peripheral edema. No clubbing or cyanosis.  Vascular: 2+ peripheral pulses  Neurological: A/O x move extremities ,gait was not tested   Musculoskeletal: no calf tenderness.  Skin: No rashes.      LABS: All Labs Reviewed:                        6.8    x     )-----------( x        ( 16 Jul 2019 09:09 )             22.9                         6.8    6.12  )-----------( 281      ( 16 Jul 2019 07:05 )             22.9                         9.0    5.56  )-----------( 300      ( 15 Jul 2019 13:15 )             31.9     16 Jul 2019 07:05    143    |  108    |  15     ----------------------------<  84     3.5     |  28     |  0.86   15 Jul 2019 13:15    145    |  109    |  17     ----------------------------<  120    3.9     |  27     |  1.04     Ca    8.1        16 Jul 2019 07:05  Ca    9.0        15 Jul 2019 13:15    TPro  7.6    /  Alb  3.0    /  TBili  0.5    /  DBili  x      /  AST  19     /  ALT  11     /  AlkPhos  93     15 Jul 2019 13:15    PT/INR - ( 15 Jul 2019 13:15 )   PT: 11.4 sec;   INR: 1.03 ratio         PTT - ( 15 Jul 2019 13:15 )  PTT:28.4 sec  CARDIAC MARKERS ( 16 Jul 2019 00:52 )  0.031 ng/mL / x     / x     / x     / x      CARDIAC MARKERS ( 15 Jul 2019 20:21 )  0.028 ng/mL / x     / x     / x     / x      CARDIAC MARKERS ( 15 Jul 2019 13:15 )  0.035 ng/mL / x     / x     / x     / x          Blood Culture:   07-15 @ 13:15  Pro Bnp [id number]    07-16 @ 07:05  TSH: 4.04      RADIOLOGY/EKG:  sinus tachycardia poor R wave progression
89 y/o F with PMH of asthma HTN and ? tachyarrhythmia  PPM for ?presents with c/o progressively worsening shortness of breath for past few days. History provided by daughter at bedside.  Pt reportedly has severe asthma and was outside on this past weekend for a birthday party in wheel chair . Pt does not do well in the heat and pt began complaining of worsening shortness of breath. Pt reportedly has had swelling of bilateral lower extremities, face and left breast. Pt has been seeing a dermatologist and allergist for many years for pruritic rash. Pt last saw her cardiologist couple months ago and states everything was normal. Pt denies chest pain, nausea, vomiting, abdominal pain, diarrhea.    Called to evaluate anemia.  She was found to have a low hemoglobin (6.8 after initial reading of 9), her daughter denies any overt bleeding is she is aware of, no melena or hematochezia.  The patient denies heartburn, dysphagia, abdominal pain nausea or vomiting.  She endorses a 15 pound weight loss over the past several months or longer due to poor appetite which is variable as well.  Her daughter has no recollection of her ever having a colonoscopy or upper endoscopy. She was transfused after admission.    PAST MEDICAL & SURGICAL HISTORY:  Asthma  History of permanent cardiac pacemaker placement    MEDICATIONS  (STANDING):  ammonium lactate 12% Lotion 1 Application(s) Topical two times a day  cyanocobalamin 1000 MICROGram(s) Oral daily  furosemide   Injectable 40 milliGRAM(s) IV Push every 12 hours  guaiFENesin  milliGRAM(s) Oral every 12 hours  hydroxyzine Hcl 10 milliGRAM(s) 10 milliGRAM(s) Oral two times a day  ipratropium    for Nebulization 500 MICROGram(s) Nebulizer every 6 hours  metoprolol tartrate 25 milliGRAM(s) Oral two times a day  montelukast 10 milliGRAM(s) Oral daily  pantoprazole  Injectable 40 milliGRAM(s) IV Push two times a day  senna 2 Tablet(s) Oral at bedtime  sodium ferric gluconate complex IVPB 125 milliGRAM(s) IV Intermittent <User Schedule>  traZODone 50 milliGRAM(s) Oral at bedtime    Allergies    aspirin (Other (Severe))    Intolerances    FAMILY HISTORY:  none    Soc: no tob, etoh    ROS: cannot obtain from patient due to AMS    PHYSICAL EXAM:  Vital Signs Last 24 Hrs  T(C): 36.8 (18 Jul 2019 17:19), Max: 36.8 (18 Jul 2019 17:19)  T(F): 98.2 (18 Jul 2019 17:19), Max: 98.2 (18 Jul 2019 17:19)  HR: 88 (18 Jul 2019 17:19) (77 - 104)  BP: 114/62 (18 Jul 2019 17:19) (114/62 - 135/55)  BP(mean): --  RR: 18 (18 Jul 2019 10:35) (18 - 18)  SpO2: 95% (18 Jul 2019 17:19) (94% - 96%)    Constitutional: frail, anxious. daughter present at bedside    Eyes: Anicteric    Neck: Supple, no JVD    Respiratory: Clear to auscultation bilaterally, breathing comfortably    Cardiovascular: Regular rate and rhythm, no murmur    Gastrointestinal: Soft.  Nontender, nondistended, bowel sounds present.  No mass.  No hepatosplenomegaly.    Rectal: Deferred    Extremities: Warm, well-perfused, trace edema    Neurological: Grossly intact, no focal deficits    Skin: No lesion or rash    Lymph Nodes: No cervical or supraclavicular lymphadenopathy    Psychiatric: anxious. moaning                          8.5    6.23  )-----------( 257      ( 18 Jul 2019 07:26 )             27.9     07-18    144  |  106  |  12  ----------------------------<  78  3.8   |  31  |  0.88    Ca    8.1<L>      18 Jul 2019 07:26  Phos  3.1     07-17    TPro  x   /  Alb  2.1<L>  /  TBili  x   /  DBili  x   /  AST  x   /  ALT  x   /  AlkPhos  x   07-17    CT a/p       EXAM:  CT ABDOMEN AND PELVIS                            PROCEDURE DATE:  07/16/2019          INTERPRETATION:  CLINICAL INFORMATION: Evaluate for retroperitoneal   bleed.  Anemia.    COMPARISON: None.    PROCEDURE:   CT of the Abdomen and Pelvis was performed without intravenous contrast.   Intravenous contrast: None.  Oral contrast: None.  Sagittal and coronal reformats were performed.    FINDINGS:    LOWER CHEST: Large left and moderate right pleural effusion, with   adjacent passive atelectasis. Mild cardiomegaly. Small hiatal hernia.    LIVER: Within normal limits.  BILE DUCTS: Normal caliber.  GALLBLADDER: Cholelithiasis  SPLEEN: Within normal limits.  PANCREAS: Within normal limits.  ADRENALS: Within normal limits.  KIDNEYS/URETERS: Right upper pole renal lesion incompletely   characterized, likely a cyst. No renal calculus or hydronephrosis.    BLADDER: Within normal limits.  REPRODUCTIVE ORGANS: Uterus and adnexa are unremarkable.    BOWEL: No bowel obstruction. Appendix is not visualized.  PERITONEUM: No ascites.  VESSELS:  Atherosclerotic calcifications.  RETROPERITONEUM: No lymphadenopathy. No retroperitoneal hematoma.  ABDOMINAL WALL: Anasarca.  BONES: Osteopenia. Compression deformity of L4. Mild loss of vertebral   body height at additional lumbar levels. Mild curvature of the spine.    IMPRESSION:     No evidence of retroperitoneal hematoma, as clinically questioned.                    JOON CHONG M.D., ATTENDING RADIOLOGIST  This document has been electronically signed. Jul 16 2019 10:55AM
HPI:  89 y/o F with PMH of asthma and PPM presents with c/o progressively worsening shortness of breath for past 2 days. History provided by 2 daughters via  phone service. Pt reportedly has severe asthma and was outside on Saturday for a birthday party. Pt does not do well in the heat and pt began complaining of worsening shortness of breath. Pt reportedly has had swelling of bilateral lower extremities, face and left breast. Pt has been seeing a dermatologist and allergist for many years for pruritic rash. Pt last saw her cardiologist couple months ago and states everything was normal. Pt reportedly had PPM placed over 20 years ago but daughter states it was never turned on as she didn't need one. Denies history of arrhythmias, AFIB, CHF. Daughter states that pt has no cardiac problems. Pt denies chest pain, nausea, vomiting, abdominal pain, diarrhea.    Daughters and patient are poor historians, unable to obtain accurate history due to discrepancies in reported history. (15 Jul 2019 18:05)    7/16: in bed, no distress.       PAST MEDICAL & SURGICAL HISTORY:  Asthma  History of permanent cardiac pacemaker placement      MEDICATIONS  (STANDING):  digoxin     Tablet 0.125 milliGRAM(s) Oral daily  furosemide   Injectable 40 milliGRAM(s) IV Push every 12 hours  guaiFENesin  milliGRAM(s) Oral every 12 hours  ipratropium    for Nebulization 500 MICROGram(s) Nebulizer every 6 hours    MEDICATIONS  (PRN):      Allergies    aspirin (Other (Severe))    Intolerances        SOCIAL HISTORY: Denies tobacco, etoh abuse or illicit drug use    FAMILY HISTORY:      Vital Signs Last 24 Hrs  T(C): 36.4 (16 Jul 2019 04:55), Max: 36.7 (15 Jul 2019 19:11)  T(F): 97.6 (16 Jul 2019 04:55), Max: 98.1 (15 Jul 2019 21:16)  HR: 100 (16 Jul 2019 04:55) (54 - 122)  BP: 116/62 (16 Jul 2019 04:55) (106/90 - 165/98)  BP(mean): --  RR: 19 (16 Jul 2019 04:55) (18 - 24)  SpO2: 90% (16 Jul 2019 04:55) (90% - 100%)    REVIEW OF SYSTEMS:    CONSTITUTIONAL:  As per HPI.  HEENT:  Eyes:  No diplopia or blurred vision. ENT:  No earache, sore throat or runny nose.  CARDIOVASCULAR:  No pressure, squeezing, tightness, heaviness or aching about the chest, neck, axilla or epigastrium.  RESPIRATORY:  No cough, shortness of breath, PND or orthopnea.  GASTROINTESTINAL:  No nausea, vomiting or diarrhea.  GENITOURINARY:  No dysuria, frequency or urgency.  MUSCULOSKELETAL:  As per HPI.  SKIN:  No change in skin, hair or nails.  NEUROLOGIC:  No paresthesias, fasciculations, seizures or weakness.  PSYCHIATRIC:  No disorder of thought or mood.  ENDOCRINE:  No heat or cold intolerance, polyuria or polydipsia.  HEMATOLOGICAL:  No easy bruising or bleedings:  .     PHYSICAL EXAMINATION:    GENERAL APPEARANCE:  Pt. is not currently dyspneic, in no distress. Pt. is alert, oriented, and pleasant.  HEENT:  Pupils are normal and react normally. No icterus. Mucous membranes well colored.  NECK:  Supple. No lymphadenopathy. Jugular venous pressure not elevated. Carotids equal.   HEART:   The cardiac impulse has a normal quality. Regular. Normal S1 and S2. There are no murmurs, rubs or gallops noted  CHEST:  Chest is clear to auscultation. Decreased aud BS's L greater than R base.  ABDOMEN:  Soft and nontender.   EXTREMITIES:  There is no cyanosis, clubbing or edema.   SKIN:  No rash or significant lesions are noted.  Neuro: Alert, awake, and O x 3.      LABS:                        6.8    x     )-----------( x        ( 16 Jul 2019 09:09 )             22.9     07-16    143  |  108  |  15  ----------------------------<  84  3.5   |  28  |  0.86    Ca    8.1<L>      16 Jul 2019 07:05    TPro  7.6  /  Alb  3.0<L>  /  TBili  0.5  /  DBili  x   /  AST  19  /  ALT  11<L>  /  AlkPhos  93  07-15    LIVER FUNCTIONS - ( 15 Jul 2019 13:15 )  Alb: 3.0 g/dL / Pro: 7.6 gm/dL / ALK PHOS: 93 U/L / ALT: 11 U/L / AST: 19 U/L / GGT: x           PT/INR - ( 15 Jul 2019 13:15 )   PT: 11.4 sec;   INR: 1.03 ratio         PTT - ( 15 Jul 2019 13:15 )  PTT:28.4 sec  CARDIAC MARKERS ( 16 Jul 2019 00:52 )  0.031 ng/mL / x     / x     / x     / x      CARDIAC MARKERS ( 15 Jul 2019 20:21 )  0.028 ng/mL / x     / x     / x     / x      CARDIAC MARKERS ( 15 Jul 2019 13:15 )  0.035 ng/mL / x     / x     / x     / x                RADIOLOGY & ADDITIONAL STUDIES:     EXAM:  CT CHEST                            PROCEDURE DATE:  07/15/2019          INTERPRETATION:  Clinical information: Congestive heart failure    COMPARISON: None    PROCEDURE:   CT of the Chest was performed without intravenous contrast.  Sagittal and coronal reformats were performed.      FINDINGS:    LOWER NECK: Within normal limits.  AXILLA, MEDIASTINUM AND HARRIET: No lymphadenopathy.  VESSELS: Atherosclerotic arterial calcifications, including the coronary   arteries.  Normal caliber aorta.  HEART: The heart is moderately enlarged.  No pericardial effusion.  PLEURA: Large left and small right pleural effusions.  LUNGS AND LARGE AIRWAYS: Extensive compressive atelectasis in the left   lung with only a small amount of aerated lung in the left upper lobe. No   evidence of pneumonia or mass. Right lower lobe calcified granuloma. Mild   bilateral lower lobe mucoid impaction.  VISUALIZED UPPER ABDOMEN: Within normal limits.  BONES: Multiple old bilateral rib fractures. Mild L1 compression  deformity, age indeterminate.  CHEST WALL:  Left chest wall unipolar pacemaker.    IMPRESSION: Large left and small right pleural effusions. Extensive left   lung compressive atelectasis.

## 2019-07-19 ENCOUNTER — RESULT REVIEW (OUTPATIENT)
Age: 84
End: 2019-07-19

## 2019-07-19 LAB
ALBUMIN FLD-MCNC: 1.7 G/DL — SIGNIFICANT CHANGE UP
ALBUMIN SERPL ELPH-MCNC: 2.4 G/DL — LOW (ref 3.3–5)
ANION GAP SERPL CALC-SCNC: 9 MMOL/L — SIGNIFICANT CHANGE UP (ref 5–17)
B PERT IGG+IGM PNL SER: CLEAR — SIGNIFICANT CHANGE UP
BUN SERPL-MCNC: 14 MG/DL — SIGNIFICANT CHANGE UP (ref 7–23)
CALCIUM SERPL-MCNC: 8.1 MG/DL — LOW (ref 8.5–10.1)
CHLORIDE SERPL-SCNC: 101 MMOL/L — SIGNIFICANT CHANGE UP (ref 96–108)
CO2 SERPL-SCNC: 33 MMOL/L — HIGH (ref 22–31)
COLOR FLD: YELLOW — SIGNIFICANT CHANGE UP
CREAT SERPL-MCNC: 0.82 MG/DL — SIGNIFICANT CHANGE UP (ref 0.5–1.3)
EOSINOPHIL # FLD: 1 % — SIGNIFICANT CHANGE UP
FLUID INTAKE SUBSTANCE CLASS: SIGNIFICANT CHANGE UP
FLUID SEGMENTED GRANULOCYTES: 15 % — SIGNIFICANT CHANGE UP
GLUCOSE FLD-MCNC: 98 MG/DL — SIGNIFICANT CHANGE UP
GLUCOSE SERPL-MCNC: 90 MG/DL — SIGNIFICANT CHANGE UP (ref 70–99)
GRAM STN FLD: SIGNIFICANT CHANGE UP
HCT VFR BLD CALC: 30.2 % — LOW (ref 34.5–45)
HGB BLD-MCNC: 9.1 G/DL — LOW (ref 11.5–15.5)
INR BLD: 1.12 RATIO — SIGNIFICANT CHANGE UP (ref 0.88–1.16)
LDH SERPL L TO P-CCNC: 123 U/L — SIGNIFICANT CHANGE UP
LDH SERPL L TO P-CCNC: 204 U/L — SIGNIFICANT CHANGE UP (ref 84–241)
LYMPHOCYTES # FLD: 57 % — SIGNIFICANT CHANGE UP
MAGNESIUM SERPL-MCNC: 1.7 MG/DL — SIGNIFICANT CHANGE UP (ref 1.6–2.6)
MCHC RBC-ENTMCNC: 21.9 PG — LOW (ref 27–34)
MCHC RBC-ENTMCNC: 30.1 GM/DL — LOW (ref 32–36)
MCV RBC AUTO: 72.6 FL — LOW (ref 80–100)
MESOTHL CELL # FLD: 5 % — SIGNIFICANT CHANGE UP
MONOS+MACROS # FLD: 22 % — SIGNIFICANT CHANGE UP
NIGHT BLUE STAIN TISS: SIGNIFICANT CHANGE UP
PH FLD: 7.48 — SIGNIFICANT CHANGE UP
PHOSPHATE SERPL-MCNC: 3.7 MG/DL — SIGNIFICANT CHANGE UP (ref 2.5–4.5)
PLATELET # BLD AUTO: 265 K/UL — SIGNIFICANT CHANGE UP (ref 150–400)
POTASSIUM SERPL-MCNC: 3.1 MMOL/L — LOW (ref 3.5–5.3)
POTASSIUM SERPL-SCNC: 3.1 MMOL/L — LOW (ref 3.5–5.3)
PROT FLD-MCNC: 3.1 G/DL — SIGNIFICANT CHANGE UP
PROTHROM AB SERPL-ACNC: 12.5 SEC — SIGNIFICANT CHANGE UP (ref 10–12.9)
RBC # BLD: 4.16 M/UL — SIGNIFICANT CHANGE UP (ref 3.8–5.2)
RBC # FLD: 17.1 % — HIGH (ref 10.3–14.5)
RCV VOL RI: 12 /UL — HIGH (ref 0–0)
SODIUM SERPL-SCNC: 143 MMOL/L — SIGNIFICANT CHANGE UP (ref 135–145)
SPECIMEN SOURCE FLD: SIGNIFICANT CHANGE UP
SPECIMEN SOURCE: SIGNIFICANT CHANGE UP
SPECIMEN SOURCE: SIGNIFICANT CHANGE UP
TOTAL NUCLEATED CELL COUNT, BODY FLUID: 439 /UL — SIGNIFICANT CHANGE UP
TUBE TYPE: SIGNIFICANT CHANGE UP
WBC # BLD: 7.85 K/UL — SIGNIFICANT CHANGE UP (ref 3.8–10.5)
WBC # FLD AUTO: 7.85 K/UL — SIGNIFICANT CHANGE UP (ref 3.8–10.5)

## 2019-07-19 PROCEDURE — 32555 ASPIRATE PLEURA W/ IMAGING: CPT | Mod: LT

## 2019-07-19 PROCEDURE — 71045 X-RAY EXAM CHEST 1 VIEW: CPT | Mod: 26

## 2019-07-19 PROCEDURE — 88108 CYTOPATH CONCENTRATE TECH: CPT | Mod: 26

## 2019-07-19 RX ORDER — MAGNESIUM OXIDE 400 MG ORAL TABLET 241.3 MG
400 TABLET ORAL EVERY 4 HOURS
Refills: 0 | Status: COMPLETED | OUTPATIENT
Start: 2019-07-19 | End: 2019-07-19

## 2019-07-19 RX ORDER — ONDANSETRON 8 MG/1
4 TABLET, FILM COATED ORAL ONCE
Refills: 0 | Status: DISCONTINUED | OUTPATIENT
Start: 2019-07-19 | End: 2019-07-19

## 2019-07-19 RX ADMIN — Medication 500 MICROGRAM(S): at 13:44

## 2019-07-19 RX ADMIN — Medication 500 MICROGRAM(S): at 08:18

## 2019-07-19 RX ADMIN — Medication 40 MILLIGRAM(S): at 05:07

## 2019-07-19 RX ADMIN — PANTOPRAZOLE SODIUM 40 MILLIGRAM(S): 20 TABLET, DELAYED RELEASE ORAL at 05:06

## 2019-07-19 RX ADMIN — Medication 50 MILLIGRAM(S): at 22:34

## 2019-07-19 RX ADMIN — PANTOPRAZOLE SODIUM 40 MILLIGRAM(S): 20 TABLET, DELAYED RELEASE ORAL at 18:27

## 2019-07-19 RX ADMIN — Medication 500 MICROGRAM(S): at 21:23

## 2019-07-19 RX ADMIN — SENNA PLUS 2 TABLET(S): 8.6 TABLET ORAL at 22:34

## 2019-07-19 RX ADMIN — MAGNESIUM OXIDE 400 MG ORAL TABLET 400 MILLIGRAM(S): 241.3 TABLET ORAL at 14:26

## 2019-07-19 RX ADMIN — Medication 600 MILLIGRAM(S): at 18:26

## 2019-07-19 RX ADMIN — MAGNESIUM OXIDE 400 MG ORAL TABLET 400 MILLIGRAM(S): 241.3 TABLET ORAL at 18:26

## 2019-07-19 RX ADMIN — MONTELUKAST 10 MILLIGRAM(S): 4 TABLET, CHEWABLE ORAL at 14:27

## 2019-07-19 RX ADMIN — Medication 600 MILLIGRAM(S): at 05:06

## 2019-07-19 RX ADMIN — PREGABALIN 1000 MICROGRAM(S): 225 CAPSULE ORAL at 14:27

## 2019-07-19 RX ADMIN — Medication 40 MILLIGRAM(S): at 18:25

## 2019-07-19 RX ADMIN — Medication 25 MILLIGRAM(S): at 05:06

## 2019-07-19 RX ADMIN — Medication 1 APPLICATION(S): at 18:25

## 2019-07-19 RX ADMIN — Medication 1 APPLICATION(S): at 05:07

## 2019-07-19 RX ADMIN — Medication 25 MILLIGRAM(S): at 18:27

## 2019-07-19 RX ADMIN — Medication 110 MILLIGRAM(S): at 22:34

## 2019-07-20 LAB
ANION GAP SERPL CALC-SCNC: 8 MMOL/L — SIGNIFICANT CHANGE UP (ref 5–17)
BUN SERPL-MCNC: 19 MG/DL — SIGNIFICANT CHANGE UP (ref 7–23)
CALCIUM SERPL-MCNC: 8.1 MG/DL — LOW (ref 8.5–10.1)
CHLORIDE SERPL-SCNC: 100 MMOL/L — SIGNIFICANT CHANGE UP (ref 96–108)
CO2 SERPL-SCNC: 34 MMOL/L — HIGH (ref 22–31)
CREAT SERPL-MCNC: 1.09 MG/DL — SIGNIFICANT CHANGE UP (ref 0.5–1.3)
GLUCOSE SERPL-MCNC: 84 MG/DL — SIGNIFICANT CHANGE UP (ref 70–99)
HCT VFR BLD CALC: 28.7 % — LOW (ref 34.5–45)
HGB BLD-MCNC: 8.7 G/DL — LOW (ref 11.5–15.5)
MCHC RBC-ENTMCNC: 22 PG — LOW (ref 27–34)
MCHC RBC-ENTMCNC: 30.3 GM/DL — LOW (ref 32–36)
MCV RBC AUTO: 72.5 FL — LOW (ref 80–100)
PLATELET # BLD AUTO: 255 K/UL — SIGNIFICANT CHANGE UP (ref 150–400)
POTASSIUM SERPL-MCNC: 2.9 MMOL/L — CRITICAL LOW (ref 3.5–5.3)
POTASSIUM SERPL-SCNC: 2.9 MMOL/L — CRITICAL LOW (ref 3.5–5.3)
RBC # BLD: 3.96 M/UL — SIGNIFICANT CHANGE UP (ref 3.8–5.2)
RBC # FLD: 18.4 % — HIGH (ref 10.3–14.5)
SODIUM SERPL-SCNC: 142 MMOL/L — SIGNIFICANT CHANGE UP (ref 135–145)
WBC # BLD: 6.8 K/UL — SIGNIFICANT CHANGE UP (ref 3.8–10.5)
WBC # FLD AUTO: 6.8 K/UL — SIGNIFICANT CHANGE UP (ref 3.8–10.5)

## 2019-07-20 PROCEDURE — 99233 SBSQ HOSP IP/OBS HIGH 50: CPT

## 2019-07-20 PROCEDURE — 71250 CT THORAX DX C-: CPT | Mod: 26

## 2019-07-20 RX ORDER — CEFEPIME 1 G/1
INJECTION, POWDER, FOR SOLUTION INTRAMUSCULAR; INTRAVENOUS
Refills: 0 | Status: DISCONTINUED | OUTPATIENT
Start: 2019-07-20 | End: 2019-07-22

## 2019-07-20 RX ORDER — DIPHENHYDRAMINE HCL 50 MG
12.5 CAPSULE ORAL ONCE
Refills: 0 | Status: COMPLETED | OUTPATIENT
Start: 2019-07-20 | End: 2019-07-20

## 2019-07-20 RX ORDER — CEFEPIME 1 G/1
1000 INJECTION, POWDER, FOR SOLUTION INTRAMUSCULAR; INTRAVENOUS ONCE
Refills: 0 | Status: DISCONTINUED | OUTPATIENT
Start: 2019-07-20 | End: 2019-07-20

## 2019-07-20 RX ORDER — CEFEPIME 1 G/1
1000 INJECTION, POWDER, FOR SOLUTION INTRAMUSCULAR; INTRAVENOUS ONCE
Refills: 0 | Status: COMPLETED | OUTPATIENT
Start: 2019-07-20 | End: 2019-07-20

## 2019-07-20 RX ORDER — CEFEPIME 1 G/1
1000 INJECTION, POWDER, FOR SOLUTION INTRAMUSCULAR; INTRAVENOUS EVERY 12 HOURS
Refills: 0 | Status: DISCONTINUED | OUTPATIENT
Start: 2019-07-20 | End: 2019-07-22

## 2019-07-20 RX ORDER — POTASSIUM CHLORIDE 20 MEQ
40 PACKET (EA) ORAL EVERY 4 HOURS
Refills: 0 | Status: COMPLETED | OUTPATIENT
Start: 2019-07-20 | End: 2019-07-20

## 2019-07-20 RX ORDER — CEFEPIME 1 G/1
INJECTION, POWDER, FOR SOLUTION INTRAMUSCULAR; INTRAVENOUS
Refills: 0 | Status: DISCONTINUED | OUTPATIENT
Start: 2019-07-20 | End: 2019-07-20

## 2019-07-20 RX ORDER — CEFEPIME 1 G/1
1000 INJECTION, POWDER, FOR SOLUTION INTRAMUSCULAR; INTRAVENOUS EVERY 12 HOURS
Refills: 0 | Status: DISCONTINUED | OUTPATIENT
Start: 2019-07-20 | End: 2019-07-20

## 2019-07-20 RX ORDER — HYDROCORTISONE 1 %
1 OINTMENT (GRAM) TOPICAL
Refills: 0 | Status: DISCONTINUED | OUTPATIENT
Start: 2019-07-20 | End: 2019-07-22

## 2019-07-20 RX ADMIN — Medication 12.5 MILLIGRAM(S): at 22:07

## 2019-07-20 RX ADMIN — MONTELUKAST 10 MILLIGRAM(S): 4 TABLET, CHEWABLE ORAL at 11:12

## 2019-07-20 RX ADMIN — Medication 1 APPLICATION(S): at 05:50

## 2019-07-20 RX ADMIN — Medication 1 APPLICATION(S): at 17:53

## 2019-07-20 RX ADMIN — Medication 40 MILLIEQUIVALENT(S): at 13:55

## 2019-07-20 RX ADMIN — Medication 500 MICROGRAM(S): at 13:58

## 2019-07-20 RX ADMIN — CEFEPIME 1000 MILLIGRAM(S): 1 INJECTION, POWDER, FOR SOLUTION INTRAMUSCULAR; INTRAVENOUS at 23:56

## 2019-07-20 RX ADMIN — Medication 110 MILLIGRAM(S): at 23:55

## 2019-07-20 RX ADMIN — Medication 600 MILLIGRAM(S): at 17:42

## 2019-07-20 RX ADMIN — Medication 500 MICROGRAM(S): at 20:03

## 2019-07-20 RX ADMIN — PANTOPRAZOLE SODIUM 40 MILLIGRAM(S): 20 TABLET, DELAYED RELEASE ORAL at 05:51

## 2019-07-20 RX ADMIN — Medication 40 MILLIGRAM(S): at 17:48

## 2019-07-20 RX ADMIN — PANTOPRAZOLE SODIUM 40 MILLIGRAM(S): 20 TABLET, DELAYED RELEASE ORAL at 17:43

## 2019-07-20 RX ADMIN — Medication 1 APPLICATION(S): at 17:49

## 2019-07-20 RX ADMIN — Medication 500 MICROGRAM(S): at 07:55

## 2019-07-20 RX ADMIN — Medication 40 MILLIEQUIVALENT(S): at 09:47

## 2019-07-20 RX ADMIN — Medication 40 MILLIGRAM(S): at 05:52

## 2019-07-20 RX ADMIN — CEFEPIME 1000 MILLIGRAM(S): 1 INJECTION, POWDER, FOR SOLUTION INTRAMUSCULAR; INTRAVENOUS at 11:12

## 2019-07-20 RX ADMIN — Medication 50 MILLIGRAM(S): at 20:50

## 2019-07-20 RX ADMIN — Medication 25 MILLIGRAM(S): at 17:44

## 2019-07-20 RX ADMIN — PREGABALIN 1000 MICROGRAM(S): 225 CAPSULE ORAL at 11:12

## 2019-07-21 LAB
ANION GAP SERPL CALC-SCNC: 6 MMOL/L — SIGNIFICANT CHANGE UP (ref 5–17)
BUN SERPL-MCNC: 19 MG/DL — SIGNIFICANT CHANGE UP (ref 7–23)
CALCIUM SERPL-MCNC: 8.9 MG/DL — SIGNIFICANT CHANGE UP (ref 8.5–10.1)
CHLORIDE SERPL-SCNC: 100 MMOL/L — SIGNIFICANT CHANGE UP (ref 96–108)
CO2 SERPL-SCNC: 35 MMOL/L — HIGH (ref 22–31)
CREAT SERPL-MCNC: 1.16 MG/DL — SIGNIFICANT CHANGE UP (ref 0.5–1.3)
GLUCOSE SERPL-MCNC: 82 MG/DL — SIGNIFICANT CHANGE UP (ref 70–99)
HCT VFR BLD CALC: 29.5 % — LOW (ref 34.5–45)
HGB BLD-MCNC: 8.9 G/DL — LOW (ref 11.5–15.5)
MCHC RBC-ENTMCNC: 22.3 PG — LOW (ref 27–34)
MCHC RBC-ENTMCNC: 30.2 GM/DL — LOW (ref 32–36)
MCV RBC AUTO: 73.8 FL — LOW (ref 80–100)
PLATELET # BLD AUTO: 260 K/UL — SIGNIFICANT CHANGE UP (ref 150–400)
POTASSIUM SERPL-MCNC: 3.3 MMOL/L — LOW (ref 3.5–5.3)
POTASSIUM SERPL-SCNC: 3.3 MMOL/L — LOW (ref 3.5–5.3)
RBC # BLD: 4 M/UL — SIGNIFICANT CHANGE UP (ref 3.8–5.2)
RBC # FLD: 18.9 % — HIGH (ref 10.3–14.5)
SODIUM SERPL-SCNC: 141 MMOL/L — SIGNIFICANT CHANGE UP (ref 135–145)
WBC # BLD: 6.74 K/UL — SIGNIFICANT CHANGE UP (ref 3.8–10.5)
WBC # FLD AUTO: 6.74 K/UL — SIGNIFICANT CHANGE UP (ref 3.8–10.5)

## 2019-07-21 PROCEDURE — 99233 SBSQ HOSP IP/OBS HIGH 50: CPT

## 2019-07-21 RX ORDER — POTASSIUM CHLORIDE 20 MEQ
40 PACKET (EA) ORAL EVERY 4 HOURS
Refills: 0 | Status: COMPLETED | OUTPATIENT
Start: 2019-07-21 | End: 2019-07-21

## 2019-07-21 RX ADMIN — Medication 1 APPLICATION(S): at 18:23

## 2019-07-21 RX ADMIN — Medication 500 MICROGRAM(S): at 08:24

## 2019-07-21 RX ADMIN — Medication 1 APPLICATION(S): at 18:20

## 2019-07-21 RX ADMIN — Medication 40 MILLIEQUIVALENT(S): at 10:21

## 2019-07-21 RX ADMIN — Medication 500 MICROGRAM(S): at 14:20

## 2019-07-21 RX ADMIN — CEFEPIME 1000 MILLIGRAM(S): 1 INJECTION, POWDER, FOR SOLUTION INTRAMUSCULAR; INTRAVENOUS at 10:17

## 2019-07-21 RX ADMIN — PANTOPRAZOLE SODIUM 40 MILLIGRAM(S): 20 TABLET, DELAYED RELEASE ORAL at 18:20

## 2019-07-21 RX ADMIN — Medication 25 MILLIGRAM(S): at 18:21

## 2019-07-21 RX ADMIN — Medication 1 APPLICATION(S): at 05:53

## 2019-07-21 RX ADMIN — MONTELUKAST 10 MILLIGRAM(S): 4 TABLET, CHEWABLE ORAL at 11:41

## 2019-07-21 RX ADMIN — Medication 110 MILLIGRAM(S): at 21:51

## 2019-07-21 RX ADMIN — Medication 50 MILLIGRAM(S): at 21:51

## 2019-07-21 RX ADMIN — PANTOPRAZOLE SODIUM 40 MILLIGRAM(S): 20 TABLET, DELAYED RELEASE ORAL at 05:52

## 2019-07-21 RX ADMIN — CEFEPIME 1000 MILLIGRAM(S): 1 INJECTION, POWDER, FOR SOLUTION INTRAMUSCULAR; INTRAVENOUS at 19:20

## 2019-07-21 RX ADMIN — PREGABALIN 1000 MICROGRAM(S): 225 CAPSULE ORAL at 11:42

## 2019-07-21 RX ADMIN — Medication 500 MICROGRAM(S): at 21:12

## 2019-07-21 RX ADMIN — Medication 40 MILLIEQUIVALENT(S): at 14:27

## 2019-07-21 RX ADMIN — POLYETHYLENE GLYCOL 3350 17 GRAM(S): 17 POWDER, FOR SOLUTION ORAL at 11:42

## 2019-07-21 RX ADMIN — Medication 40 MILLIGRAM(S): at 05:53

## 2019-07-21 NOTE — PROGRESS NOTE ADULT - PROBLEM SELECTOR PROBLEM 4
Pacemaker
CHF (congestive heart failure)
CHF (congestive heart failure)

## 2019-07-21 NOTE — PROGRESS NOTE ADULT - ASSESSMENT
89 y/o F with PMH of asthma and PPM presents with c/o progressively worsening shortness of breath for past 2 days.   Patient found to have CHF/ large pleural effusion and anemia with hgb of 6.8.      # Acute respiratory failure 2/2 Acute on Chronic Diastolic CHF exacerbation and Anemia:    ECHO noted-- EF WNL.    BNP 15k  Cont diuresis with lasix.    Cardio f/u.      #Large left pleural effusion:    Suspect related to CHF; however, cannot r/o other etiologist.    D/w pulm-- plan for IR thoracentesis for diagnostic and therapeutic benefit.    Cont diuresis for now.      # Iron Def Anemia:    Suspect chronic blood loss-- likely related to GI losses.    Hemoccult stool.    IV iron nightly.    GI eval.    Follow counts.    S/p 1 unit on 7/16.    Suspect admission hgb of 9 was error.    CT negative for RPB.    Hemolysis work up negative.      # Sinus Tachycardia:   No events on PPM.    Cont metoprolol-- increase dose.      #Asthma:  stable.    Cont singulair.      #Rash:    Unclear etiology.  F/u derm outpatient.  Cont atarax.      #Weakness/ Trouble ambulating:    As per family, trouble walking last few weeks.  Worried she was goign to fall.    Pt did have fall few months ago.    PT eval.      #Goals of Care/ Code Status:    D/w daughter and son.  Wish full code for now.
89 y/o F with PMH of asthma and PPM  admitted for;     # Acute hypoxic  respiratory failure 2/2 Acute on Chronic Diastolic CHF exacerbation and Anemia:    ECHO noted-- EF WNL.    BNP 15k  Cont diuresis with lasix.    daily weight   D/w DR Chirinos     #Large left pleural effusion:    Suspect related to CHF; however, cannot r/o other etiologist.    D/w Dr Balderas will attempt thoracentesis with sedation    will plan for Tc and DX thora  Cont diuresis for now.  Repeat CXR in am     #  Iron Def Anemia:    Suspect chronic blood loss-- likely related to GI losses.    No signs of acute bleeding   S/p 1u PRBCs 7/16  IV iron nightly.     Monitor H/H    CT negative for RPB.    Hemolysis work up negative.    GI eval pending     # Sinus Tachycardia:   No events on PPM.    Cont metoprolol-- increased  dose.      #Asthma:  stable.    Cont singular  Neb Tx PRN       #Rash:    Unclear etiology.  F/u derm outpatient.  Cont atarax.    LAc Hydrin     #Weakness/ Trouble ambulating:    As per family, trouble walking last few weeks.  Worried she was going to fall.    Pt did have fall few months ago.    PT eval.      #Goals of Care/ Code Status:    D/w daughter and son.  Wish full code for now.
89 y/o F with PMH of asthma and PPM  admitted for;     # Acute hypoxic  respiratory failure 2/2 Acute on Chronic Diastolic CHF exacerbation and Anemia:    ECHO noted-- EF WNL.    BNP elevated   Cont diuresis with  IV lasix.    daily weight     #Large left pleural effusion:    Suspect related to CHF;   S/p IR thoracentesis, 910ml of fluid removed  F/u Fluid analysis    # Left pneumonia-HCAP  Suspected gram negative rods  Start IV cefepime    #  Iron Def Anemia:    Suspect chronic blood loss-- likely related to GI losses.    No signs of acute bleeding   S/p 1u PRBCs 7/16  IV iron nightly.     Monitor H/H, stable   CT negative for RPB.    Hemolysis work up negative.    FOBT neg  GI eval appreciated, family refused EGD     # Sinus Tachycardia. H/o AFIB   No events on PPM.    Cont metoprolol-- dose increased      #Asthma:  stable.    Cont singular  Neb Tx PRN       # Hypokalemia-replacing  Follow      #Rash:    Unclear etiology.  F/u derm outpatient.  Cont atarax.    Lac Hydrin.     #Weakness/ Trouble ambulating:    As per family, trouble walking last few weeks.  Worried she was going to fall.    Pt did have fall few months ago.    PT eval.
89 y/o F with PMH of asthma and PPM  admitted for;     # Acute hypoxic  respiratory failure 2/2 Acute on Chronic Diastolic CHF exacerbation and Anemia:    ECHO noted-- EF WNL.    BNP elevated   Cont diuresis with  IV lasix.    daily weight   D/w DR Chirinos     #Large left pleural effusion:    Suspect related to CHF; however, cannot r/o other etiologies   S/p IR thoracentesis, 910ml of fluid removed  F/u Fluid analysis  Post procedure CXR with resolution of L pleural effusion but has L midlung opacity. Doubt PNA as Pt is not symptomatic  D/w DR Dodson, will order CT chest and f/u results   Cont diuresis for now.      #  Iron Def Anemia:    Suspect chronic blood loss-- likely related to GI losses.    No signs of acute bleeding   S/p 1u PRBCs 7/16  IV iron nightly.     Monitor H/H, stabel   CT negative for RPB.    Hemolysis work up negative.    FOBT neg  GI eval appreciated, family refused EGD     # Sinus Tachycardia. H/o AFIB   No events on PPM.    Cont metoprolol-- dose increased      #Asthma:  stable.    Cont singular  Neb Tx PRN         #Rash:    Unclear etiology.  F/u derm outpatient.  Cont atarax.    Lac Hydrin.     #Weakness/ Trouble ambulating:    As per family, trouble walking last few weeks.  Worried she was going to fall.    Pt did have fall few months ago.    PT eval.      #Goals of Care/ Code Status:    D/w daughters, they does not want further evaluation for anemia, wants Pt to be comfortable and take her home. Recommended to establish care with  Home visiting doctor. Will arrange HC on d/c
L greater than R pleural effusions secondary to CHF.  On IV lasix.  NC O2.  Hb 8.4 today, s/p transfusion PRBC's yesterday.  Will likely need diagnostic, therapeutic thoracentesis on L.     h.o. asthma. not wheezing.  Montelukast, ipratropium nebs.     h.o. PM placement.
L greater than R pleural effusions secondary to CHF.  On IV lasix.  NC O2.  s/p thoracentesis yesterday,  910 cc, straw colored, borderline transudate/exudate with LDH ratio 0.6,  polys no org on gm stain, neg AFB smear, pH 7.48.    CT scan after removal of pleural fluid shows extensive consolidation L lung, PNA. IV antibiotics.    h.o. asthma. not wheezing.  Montelukast, ipratropium nebs.     anemia.     h.o. PM placement.
89 y/o F with PMH of asthma and PPM presents with c/o progressively worsening shortness of breath for past 2 days.   Patient found to have CHF/ large pleural effusion and anemia with hgb of 6.8.      # Acute respiratory failure 2/2 CHF/ Anemia:    BNP 15k, no known hx of HF.  Check ECHO.    Cont lasix.    Appreciate pulm/ cardio evals.    Will likely require thoracentesis for large pleural effusion for diagnostic/ therapeutic benefit.      # Anemia:    Unclear etiology.    Hgb on admission 9-- now repeated 6.8.    No obvious bleeding overnight.    CT Abd done today without RPB.    Unclear if admission hgb of 9 may have been lab error and actually 6.8.    Transfuse 1 unit.  Repeat H&H post transfusion.    Iron levels extremely low concerning for more acute on chronic GI bleed.    GI eval.    F/u hemolysis work up-- less likely with normal retic count.      # Sinus Tachycardia:   EP to interrogate PPM.    F/u.      #Asthma:  stable.    Cont singulair.      #Rash:    Unclear etiology.  F/u derm outpatient.  Cont atarax.
89 y/o F with PMH of asthma and PPM  admitted for;     # Acute hypoxic  respiratory failure 2/2 Acute on Chronic Diastolic CHF exacerbation and Anemia:    ECHO noted-- EF WNL.    BNP elevated   Cont diuresis with  IV lasix.    daily weight     #Large left pleural effusion:    Suspect related to CHF;   S/p IR thoracentesis, 910ml of fluid removed  F/u Fluid analysis    # Left pneumonia-HCAP  Suspected gram negative rods  Continue IV cefepime    #  Iron Def Anemia:    Suspect chronic blood loss-- likely related to GI losses.    No signs of acute bleeding   S/p 1u PRBCs 7/16  IV iron nightly.     Monitor H/H, stable   CT negative for RPB.    Hemolysis work up negative.    FOBT neg  GI eval appreciated, family refused EGD     # Sinus Tachycardia. H/o AFIB   No events on PPM.    Cont metoprolol-- dose increased      #Asthma:  stable.    Cont singular  Neb Tx PRN       # Hypokalemia-replacing  Follow    #Rash:    Unclear etiology.  F/u derm outpatient.  Cont atarax.    Lac Hydrin.     #Weakness/ Trouble ambulating:    As per family, trouble walking last few weeks.  Worried she was going to fall.    Pt did have fall few months ago.    PT eval appreciated
L greater than R pleural effusions secondary to CHF.  On IV lasix.  NC O2.  Patient was uncooperative for thoracentesis yesterday.     h.o. asthma. not wheezing.  Montelukast, ipratropium nebs.     anemia.     h.o. PM placement.
L greater than R pleural effusions secondary to CHF.  On IV lasix.  NC O2.  s/p thoracentesis yesterday,  910 cc, straw colored, borderline transudate/exudate with LDH ratio 0.6,  polys no org on gm stain, neg AFB smear, pH 7.48.    CT scan after removal of pleural fluid shows extensive consolidation L lung, PNA. To treat for HCAP.     h.o. asthma. not wheezing.  Montelukast, ipratropium nebs.     anemia.     h.o. PM placement.

## 2019-07-21 NOTE — PROGRESS NOTE ADULT - PROVIDER SPECIALTY LIST ADULT
Cardiology
Cardiology
Gastroenterology
Hospitalist
Hospitalist
Pulmonology
Pulmonology
Hospitalist
Pulmonology
Pulmonology

## 2019-07-21 NOTE — PROGRESS NOTE ADULT - SUBJECTIVE AND OBJECTIVE BOX
CC: Shortness of breath (18 Jul 2019 15:55)    HPI:  89 y/o F with PMH of asthma and PPM presents with c/o progressively worsening shortness of breath for past 2 days. History provided by 2 daughters via  phone service. Pt reportedly has severe asthma and was outside on Saturday for a birthday party. Pt does not do well in the heat and pt began complaining of worsening shortness of breath. Pt reportedly has had swelling of bilateral lower extremities, face and left breast. Pt has been seeing a dermatologist and allergist for many years for pruritic rash. Pt last saw her cardiologist couple months ago and states everything was normal. Pt reportedly had PPM placed over 20 years ago but daughter states it was never turned on as she didn't need one. Denies history of arrhythmias, AFIB, CHF. Daughter states that pt has no cardiac problems. Pt denies chest pain, nausea, vomiting, abdominal pain, diarrhea.      INTERVAL HPI/ OVERNIGHT EVENTS: Pt was seen and examined, family at bedside. Pt hs/p thoracentesis today, 900ml removed. Pt tolerated procedure well, had sedation. As per family Pt more awake now, looks comfortable. Pt is frequently calling out, but does not offer any complains.  POC discussed, will await for results and discuss further management with Pulm. Has opacity on CXR after procedure. Not febrile       Vital Signs Last 24 Hrs  T(C): 36.8 (18 Jul 2019 17:19), Max: 36.8 (18 Jul 2019 17:19)   T(F): 98.2 (18 Jul 2019 17:19), Max: 98.2 (18 Jul 2019 17:19)  HR: 88 (18 Jul 2019 17:19) (77 - 104)  BP: 114/62 (18 Jul 2019 17:19) (114/62 - 135/55)  RR: 18 (18 Jul 2019 10:35) (18 - 18)  SpO2: 95% (18 Jul 2019 17:19) (94% - 96%)    REVIEW OF SYSTEMS:  Unable to obtain due to Pt has dementia     PHYSICAL EXAM:  General: Well developed; malnourished, frail elderly female,  in no acute distress  Eyes: PERRLA, EOMI; conjunctiva and sclera clear  Head: Normocephalic; atraumatic  ENMT: No nasal discharge; airway clear  Neck: Supple; no JVD   Chest: no skin changes, no lumps or nodules palpated.   Respiratory: Slightly improved BS on L.  No wheezes, rales or rhonchi  Cardiovascular: Regular rate and rhythm. S1 and S2 Normal; No murmurs  Gastrointestinal: Soft non-tender non-distended; Normal bowel sounds  Genitourinary: No suprapubic  tenderness  Extremities:  edema improved  Vascular: Peripheral pulses palpable 2+ bilaterally  Neurological: Alert and oriented x 1-2, non focal, confused   Skin: Warm and dry.  + rash LE/UE with scratch marks and excoriations, dry, no discharge   Lymph Nodes: No acute cervical adenopathy  Musculoskeletal: Normal  muscle tone  Psychiatric: Cooperative      LABS:                         8.5    6.23  )-----------( 257      ( 18 Jul 2019 07:26 )             27.9     18 Jul 2019 07:26    144    |  106    |  12     ----------------------------<  78     3.8     |  31     |  0.88     Ca    8.1        18 Jul 2019 07:26  Phos  3.1       17 Jul 2019 07:37    TPro  x      /  Alb  2.1    /  TBili  x      /  DBili  x      /  AST  x      /  ALT  x      /  AlkPhos  x      17 Jul 2019 07:37      LIVER FUNCTIONS - ( 17 Jul 2019 07:37 )  Alb: 2.1 g/dL / Pro: x     / ALK PHOS: x     / ALT: x     / AST: x     / GGT: x             MEDICATIONS  (STANDING):  ammonium lactate 12% Lotion 1 Application(s) Topical two times a day  cyanocobalamin 1000 MICROGram(s) Oral daily  furosemide   Injectable 40 milliGRAM(s) IV Push every 12 hours  guaiFENesin  milliGRAM(s) Oral every 12 hours  hydroxyzine Hcl 10 milliGRAM(s) 10 milliGRAM(s) Oral two times a day  ipratropium    for Nebulization 500 MICROGram(s) Nebulizer every 6 hours  magnesium oxide 400 milliGRAM(s) Oral every 4 hours  metoprolol tartrate 25 milliGRAM(s) Oral two times a day  montelukast 10 milliGRAM(s) Oral daily  pantoprazole  Injectable 40 milliGRAM(s) IV Push two times a day  senna 2 Tablet(s) Oral at bedtime  sodium ferric gluconate complex IVPB 125 milliGRAM(s) IV Intermittent <User Schedule>  traZODone 50 milliGRAM(s) Oral at bedtime    MEDICATIONS  (PRN):  ondansetron Injectable 4 milliGRAM(s) IV Push once PRN Nausea and/or Vomiting  polyethylene glycol 3350 17 Gram(s) Oral daily PRN Constipation        RADIOLOGY & ADDITIONAL TESTS:  < from: Xray Chest 1 View-PORTABLE IMMEDIATE (07.19.19 @ 11:00) >    EXAM:  XR CHEST PORTABLE IMMED 1V                          PROCEDURE DATE:  07/19/2019        INTERPRETATION:  Clinical information: Left pleural effusion, status post   thoracentesis    Portable AP chest radiograph from 1057 hours:    COMPARISON:  Same day radiograph, 0920 hours    FINDINGS:  Left chest wall unipolar pacemaker. Cardiac and mediastinal   contours are stable.    Small right pleural effusion. Resolution of left pleural effusion. No   pneumothorax. Left mid lung airspace opacity is now more evident.    IMPRESSION: Resolution of left pleural effusion. No pneumothorax.  Left midlung airspace opacity raising the possibility of pneumonia.  Unchanged small right pleural effusion.            < from: CT Chest No Cont (07.15.19 @ 17:05) >    LOWER NECK: Within normal limits.  AXILLA, MEDIASTINUM AND HARRIET: No lymphadenopathy.  VESSELS: Atherosclerotic arterial calcifications, including the coronary   arteries.  Normal caliber aorta.  HEART: The heart is moderately enlarged.  No pericardial effusion.  PLEURA: Large left and small right pleural effusions.  LUNGS AND LARGE AIRWAYS: Extensive compressive atelectasis in the left   lung with only a small amount of aerated lung in the left upper lobe. No   evidence of pneumonia or mass. Right lower lobe calcified granuloma. Mild   bilateral lower lobe mucoid impaction.  VISUALIZED UPPER ABDOMEN: Within normal limits.  BONES: Multiple old bilateral rib fractures. Mild L1 compression  deformity, age indeterminate.  CHEST WALL:  Left chest wall unipolar pacemaker.    IMPRESSION: Large left and small right pleural effusions. Extensive left   lung compressive atelectasi      < from: CT Abdomen and Pelvis No Cont (07.16.19 @ 10:42) >  FINDINGS:  LOWER CHEST: Large left and moderate right pleural effusion, with   adjacent passive atelectasis. Mild cardiomegaly. Small hiatal hernia.    LIVER: Within normal limits.  BILE DUCTS: Normal caliber.  GALLBLADDER: Cholelithiasis  SPLEEN: Within normal limits.  PANCREAS: Within normal limits.  ADRENALS: Within normal limits.  KIDNEYS/URETERS: Right upper pole renal lesion incompletely   characterized, likely a cyst. No renal calculus or hydronephrosis.    BLADDER: Within normal limits.  REPRODUCTIVE ORGANS: Uterus and adnexa are unremarkable.    BOWEL: No bowel obstruction. Appendix is not visualized.  PERITONEUM: No ascites.  VESSELS:  Atherosclerotic calcifications.  RETROPERITONEUM: No lymphadenopathy. No retroperitoneal hematoma.  ABDOMINAL WALL: Anasarca.  BONES: Osteopenia. Compression deformity of L4. Mild loss of vertebral   bodyheight at additional lumbar levels. Mild curvature of the spine.    IMPRESSION:   No evidence of retroperitoneal hematoma, as clinically questioned.
CC: Shortness of breath (18 Jul 2019 15:55)    HPI:  89 y/o F with PMH of asthma and PPM presents with c/o progressively worsening shortness of breath for past 2 days. History provided by 2 daughters via  phone service. Pt reportedly has severe asthma and was outside on Saturday for a birthday party. Pt does not do well in the heat and pt began complaining of worsening shortness of breath. Pt reportedly has had swelling of bilateral lower extremities, face and left breast. Pt has been seeing a dermatologist and allergist for many years for pruritic rash. Pt last saw her cardiologist couple months ago and states everything was normal. Pt reportedly had PPM placed over 20 years ago but daughter states it was never turned on as she didn't need one. Denies history of arrhythmias, AFIB, CHF. Daughter states that pt has no cardiac problems. Pt denies chest pain, nausea, vomiting, abdominal pain, diarrhea.      INTERVAL HPI/ OVERNIGHT EVENTS:  chart reviewed, Pt was seen and examined, daughter at bedside, confirms history above. Overall SOB improved. Pt was not able tolerate Thoracentesis yesterday, d/w daughter, will try to arrange thoracentesis with sedation.  Daughter agrees. Also  conservative approach discussed.     Vital Signs Last 24 Hrs  T(C): 36.8 (18 Jul 2019 17:19), Max: 36.8 (18 Jul 2019 17:19)   T(F): 98.2 (18 Jul 2019 17:19), Max: 98.2 (18 Jul 2019 17:19)  HR: 88 (18 Jul 2019 17:19) (77 - 104)  BP: 114/62 (18 Jul 2019 17:19) (114/62 - 135/55)  RR: 18 (18 Jul 2019 10:35) (18 - 18)  SpO2: 95% (18 Jul 2019 17:19) (94% - 96%)    REVIEW OF SYSTEMS:  Unable to obtain due to Pt has dementia     PHYSICAL EXAM:  General: Well developed; malnourished, frail elderly female,  in no acute distress  Eyes: PERRLA, EOMI; conjunctiva and sclera clear  Head: Normocephalic; atraumatic  ENMT: No nasal discharge; airway clear  Neck: Supple; no JVD   Respiratory: Decreased BS L>R, No wheezes, rales or rhonchi  Cardiovascular: Regular rate and rhythm. S1 and S2 Normal; No murmurs  Gastrointestinal: Soft non-tender non-distended; Normal bowel sounds  Genitourinary: No suprapubic  tenderness  Extremities:  edema improved  Vascular: Peripheral pulses palpable 2+ bilaterally  Neurological: Alert and oriented x 1-2, non focal, confused   Skin: Warm and dry.  + rash LE/UE with scratch marks and excoriations, dry, no discharge   Lymph Nodes: No acute cervical adenopathy  Musculoskeletal: Normal  muscle tone  Psychiatric: Cooperative      LABS:                         8.5    6.23  )-----------( 257      ( 18 Jul 2019 07:26 )             27.9     18 Jul 2019 07:26    144    |  106    |  12     ----------------------------<  78     3.8     |  31     |  0.88     Ca    8.1        18 Jul 2019 07:26  Phos  3.1       17 Jul 2019 07:37    TPro  x      /  Alb  2.1    /  TBili  x      /  DBili  x      /  AST  x      /  ALT  x      /  AlkPhos  x      17 Jul 2019 07:37      LIVER FUNCTIONS - ( 17 Jul 2019 07:37 )  Alb: 2.1 g/dL / Pro: x     / ALK PHOS: x     / ALT: x     / AST: x     / GGT: x               MEDICATIONS  (STANDING):  ammonium lactate 12% Lotion 1 Application(s) Topical two times a day  furosemide   Injectable 40 milliGRAM(s) IV Push every 12 hours  guaiFENesin  milliGRAM(s) Oral every 12 hours  hydroxyzine Hcl 10 milliGRAM(s) 10 milliGRAM(s) Oral two times a day  ipratropium    for Nebulization 500 MICROGram(s) Nebulizer every 6 hours  metoprolol tartrate 25 milliGRAM(s) Oral two times a day  montelukast 10 milliGRAM(s) Oral daily  pantoprazole  Injectable 40 milliGRAM(s) IV Push two times a day  sodium ferric gluconate complex IVPB 125 milliGRAM(s) IV Intermittent <User Schedule>  traZODone 50 milliGRAM(s) Oral at bedtime    MEDICATIONS  (PRN):      RADIOLOGY & ADDITIONAL TESTS:  < from: CT Chest No Cont (07.15.19 @ 17:05) >    LOWER NECK: Within normal limits.  AXILLA, MEDIASTINUM AND HARRIET: No lymphadenopathy.  VESSELS: Atherosclerotic arterial calcifications, including the coronary   arteries.  Normal caliber aorta.  HEART: The heart is moderately enlarged.  No pericardial effusion.  PLEURA: Large left and small right pleural effusions.  LUNGS AND LARGE AIRWAYS: Extensive compressive atelectasis in the left   lung with only a small amount of aerated lung in the left upper lobe. No   evidence of pneumonia or mass. Right lower lobe calcified granuloma. Mild   bilateral lower lobe mucoid impaction.  VISUALIZED UPPER ABDOMEN: Within normal limits.  BONES: Multiple old bilateral rib fractures. Mild L1 compression  deformity, age indeterminate.  CHEST WALL:  Left chest wall unipolar pacemaker.    IMPRESSION: Large left and small right pleural effusions. Extensive left   lung compressive atelectasi      < from: CT Abdomen and Pelvis No Cont (07.16.19 @ 10:42) >  FINDINGS:  LOWER CHEST: Large left and moderate right pleural effusion, with   adjacent passive atelectasis. Mild cardiomegaly. Small hiatal hernia.    LIVER: Within normal limits.  BILE DUCTS: Normal caliber.  GALLBLADDER: Cholelithiasis  SPLEEN: Within normal limits.  PANCREAS: Within normal limits.  ADRENALS: Within normal limits.  KIDNEYS/URETERS: Right upper pole renal lesion incompletely   characterized, likely a cyst. No renal calculus or hydronephrosis.    BLADDER: Within normal limits.  REPRODUCTIVE ORGANS: Uterus and adnexa are unremarkable.    BOWEL: No bowel obstruction. Appendix is not visualized.  PERITONEUM: No ascites.  VESSELS:  Atherosclerotic calcifications.  RETROPERITONEUM: No lymphadenopathy. No retroperitoneal hematoma.  ABDOMINAL WALL: Anasarca.  BONES: Osteopenia. Compression deformity of L4. Mild loss of vertebral   bodyheight at additional lumbar levels. Mild curvature of the spine.    IMPRESSION:   No evidence of retroperitoneal hematoma, as clinically questioned.
CC: Shortness of breath (18 Jul 2019 15:55)    HPI:  89 y/o F with PMH of asthma and PPM presents with c/o progressively worsening shortness of breath for past 2 days. History provided by 2 daughters via  phone service. Pt reportedly has severe asthma and was outside on Saturday for a birthday party. Pt does not do well in the heat and pt began complaining of worsening shortness of breath. Pt reportedly has had swelling of bilateral lower extremities, face and left breast. Pt has been seeing a dermatologist and allergist for many years for pruritic rash. Pt last saw her cardiologist couple months ago and states everything was normal. Pt reportedly had PPM placed over 20 years ago but daughter states it was never turned on as she didn't need one. Denies history of arrhythmias, AFIB, CHF. Daughter states that pt has no cardiac problems. Pt denies chest pain, nausea, vomiting, abdominal pain, diarrhea.      INTERVAL HPI/ OVERNIGHT EVENTS: Pt was seen and examined, family at bedside. Pt hs/p thoracentesis today, 900ml removed. Pt tolerated procedure well, had sedation. As per family Pt more awake now, looks comfortable. Pt is frequently calling out, but does not offer any complains.  POC discussed, will await for results and discuss further management with Pulm. Has opacity on CXR after procedure. Not febrile  07/20/19: Patient seen and examined. No new complaints.       Vital Signs Last 24 Hrs  T(C): 36.6 (20 Jul 2019 10:55), Max: 36.9 (19 Jul 2019 16:14)  T(F): 97.9 (20 Jul 2019 10:55), Max: 98.5 (19 Jul 2019 16:14)  HR: 97 (20 Jul 2019 10:55) (80 - 104)  BP: 117/47 (20 Jul 2019 10:55) (110/58 - 117/47)  BP(mean): --  RR: 17 (20 Jul 2019 10:55) (17 - 17)  SpO2: 100% (20 Jul 2019 10:55) (90% - 100%)    REVIEW OF SYSTEMS:  Unable to obtain due to Pt has dementia     PHYSICAL EXAM:  General: Well developed; malnourished, frail elderly female,  in no acute distress  Eyes: PERRLA, EOMI; conjunctiva and sclera clear  Head: Normocephalic; atraumatic  ENMT: No nasal discharge; airway clear  Neck: Supple; no JVD   Chest: no skin changes, no lumps or nodules palpated.   Respiratory: Slightly improved BS on L.  No wheezes, rales or rhonchi  Cardiovascular: Regular rate and rhythm. S1 and S2 Normal; No murmurs  Gastrointestinal: Soft non-tender non-distended; Normal bowel sounds  Genitourinary: No suprapubic  tenderness  Extremities:  edema improved  Vascular: Peripheral pulses palpable 2+ bilaterally  Neurological: Alert and oriented x 1-2, non focal, confused   Skin: Warm and dry.  + rash LE/UE with scratch marks and excoriations, dry, no discharge   Lymph Nodes: No acute cervical adenopathy  Musculoskeletal: Normal  muscle tone  Psychiatric: Cooperative      LABS:                               8.7    6.80  )-----------( 255      ( 20 Jul 2019 07:35 )             28.7     20 Jul 2019 07:35    142    |  100    |  19     ----------------------------<  84     2.9     |  34     |  1.09     Ca    8.1        20 Jul 2019 07:35  Phos  3.7       19 Jul 2019 08:39  Mg     1.7       19 Jul 2019 08:39    TPro  x      /  Alb  2.4    /  TBili  x      /  DBili  x      /  AST  x      /  ALT  x      /  AlkPhos  x      19 Jul 2019 16:45    LIVER FUNCTIONS - ( 19 Jul 2019 16:45 )  Alb: 2.4 g/dL / Pro: x     / ALK PHOS: x     / ALT: x     / AST: x     / GGT: x           PT/INR - ( 19 Jul 2019 08:39 )   PT: 12.5 sec;   INR: 1.12 ratio                         MEDICATIONS  (STANDING):  ammonium lactate 12% Lotion 1 Application(s) Topical two times a day  cyanocobalamin 1000 MICROGram(s) Oral daily  furosemide   Injectable 40 milliGRAM(s) IV Push every 12 hours  guaiFENesin  milliGRAM(s) Oral every 12 hours  hydroxyzine Hcl 10 milliGRAM(s) 10 milliGRAM(s) Oral two times a day  ipratropium    for Nebulization 500 MICROGram(s) Nebulizer every 6 hours  magnesium oxide 400 milliGRAM(s) Oral every 4 hours  metoprolol tartrate 25 milliGRAM(s) Oral two times a day  montelukast 10 milliGRAM(s) Oral daily  pantoprazole  Injectable 40 milliGRAM(s) IV Push two times a day  senna 2 Tablet(s) Oral at bedtime  sodium ferric gluconate complex IVPB 125 milliGRAM(s) IV Intermittent <User Schedule>  traZODone 50 milliGRAM(s) Oral at bedtime    MEDICATIONS  (PRN):  ondansetron Injectable 4 milliGRAM(s) IV Push once PRN Nausea and/or Vomiting  polyethylene glycol 3350 17 Gram(s) Oral daily PRN Constipation        RADIOLOGY & ADDITIONAL TESTS:  < from: Xray Chest 1 View-PORTABLE IMMEDIATE (07.19.19 @ 11:00) >    EXAM:  XR CHEST PORTABLE IMMED 1V                          PROCEDURE DATE:  07/19/2019        INTERPRETATION:  Clinical information: Left pleural effusion, status post   thoracentesis    Portable AP chest radiograph from 1057 hours:    COMPARISON:  Same day radiograph, 0920 hours    FINDINGS:  Left chest wall unipolar pacemaker. Cardiac and mediastinal   contours are stable.    Small right pleural effusion. Resolution of left pleural effusion. No   pneumothorax. Left mid lung airspace opacity is now more evident.    IMPRESSION: Resolution of left pleural effusion. No pneumothorax.  Left midlung airspace opacity raising the possibility of pneumonia.  Unchanged small right pleural effusion.            < from: CT Chest No Cont (07.15.19 @ 17:05) >    LOWER NECK: Within normal limits.  AXILLA, MEDIASTINUM AND HARRIET: No lymphadenopathy.  VESSELS: Atherosclerotic arterial calcifications, including the coronary   arteries.  Normal caliber aorta.  HEART: The heart is moderately enlarged.  No pericardial effusion.  PLEURA: Large left and small right pleural effusions.  LUNGS AND LARGE AIRWAYS: Extensive compressive atelectasis in the left   lung with only a small amount of aerated lung in the left upper lobe. No   evidence of pneumonia or mass. Right lower lobe calcified granuloma. Mild   bilateral lower lobe mucoid impaction.  VISUALIZED UPPER ABDOMEN: Within normal limits.  BONES: Multiple old bilateral rib fractures. Mild L1 compression  deformity, age indeterminate.  CHEST WALL:  Left chest wall unipolar pacemaker.    IMPRESSION: Large left and small right pleural effusions. Extensive left   lung compressive atelectasi      < from: CT Abdomen and Pelvis No Cont (07.16.19 @ 10:42) >  FINDINGS:  LOWER CHEST: Large left and moderate right pleural effusion, with   adjacent passive atelectasis. Mild cardiomegaly. Small hiatal hernia.    LIVER: Within normal limits.  BILE DUCTS: Normal caliber.  GALLBLADDER: Cholelithiasis  SPLEEN: Within normal limits.  PANCREAS: Within normal limits.  ADRENALS: Within normal limits.  KIDNEYS/URETERS: Right upper pole renal lesion incompletely   characterized, likely a cyst. No renal calculus or hydronephrosis.    BLADDER: Within normal limits.  REPRODUCTIVE ORGANS: Uterus and adnexa are unremarkable.    BOWEL: No bowel obstruction. Appendix is not visualized.  PERITONEUM: No ascites.  VESSELS:  Atherosclerotic calcifications.  RETROPERITONEUM: No lymphadenopathy. No retroperitoneal hematoma.  ABDOMINAL WALL: Anasarca.  BONES: Osteopenia. Compression deformity of L4. Mild loss of vertebral   bodyheight at additional lumbar levels. Mild curvature of the spine.    IMPRESSION:   No evidence of retroperitoneal hematoma, as clinically questioned.
HPI:  89 y/o F with PMH of asthma and PPM presents with c/o progressively worsening shortness of breath for past 2 days. History provided by 2 daughters via  phone service. Pt reportedly has severe asthma and was outside on Saturday for a birthday party. Pt does not do well in the heat and pt began complaining of worsening shortness of breath. Pt reportedly has had swelling of bilateral lower extremities, face and left breast. Pt has been seeing a dermatologist and allergist for many years for pruritic rash. Pt last saw her cardiologist couple months ago and states everything was normal. Pt reportedly had PPM placed over 20 years ago but daughter states it was never turned on as she didn't need one. Denies history of arrhythmias, AFIB, CHF. Daughter states that pt has no cardiac problems. Pt denies chest pain, nausea, vomiting, abdominal pain, diarrhea.    Daughters and patient are poor historians, unable to obtain accurate history due to discrepancies in reported history. (15 Jul 2019 18:05)    7/16: in bed, no distress.   7/17: no distress.   7/18: no distress, receiving neb treatment. in bed.   7/20: in bed, no distress.  7/21: in bed, no distress.      PAST MEDICAL & SURGICAL HISTORY:  Asthma  History of permanent cardiac pacemaker placement      MEDICATIONS  (STANDING):  digoxin     Tablet 0.125 milliGRAM(s) Oral daily  furosemide   Injectable 40 milliGRAM(s) IV Push every 12 hours  guaiFENesin  milliGRAM(s) Oral every 12 hours  ipratropium    for Nebulization 500 MICROGram(s) Nebulizer every 6 hours    MEDICATIONS  (PRN):      Allergies    aspirin (Other (Severe))    Intolerances        SOCIAL HISTORY: Denies tobacco, etoh abuse or illicit drug use    FAMILY HISTORY:      Vital Signs Last 24 Hrs  T(C): 36.4 (16 Jul 2019 04:55), Max: 36.7 (15 Jul 2019 19:11)  T(F): 97.6 (16 Jul 2019 04:55), Max: 98.1 (15 Jul 2019 21:16)  HR: 100 (16 Jul 2019 04:55) (54 - 122)  BP: 116/62 (16 Jul 2019 04:55) (106/90 - 165/98)  BP(mean): --  RR: 19 (16 Jul 2019 04:55) (18 - 24)  SpO2: 90% (16 Jul 2019 04:55) (90% - 100%)    REVIEW OF SYSTEMS:    CONSTITUTIONAL:  As per HPI.  HEENT:  Eyes:  No diplopia or blurred vision. ENT:  No earache, sore throat or runny nose.  CARDIOVASCULAR:  No pressure, squeezing, tightness, heaviness or aching about the chest, neck, axilla or epigastrium.  RESPIRATORY:  No cough, shortness of breath, PND or orthopnea.  GASTROINTESTINAL:  No nausea, vomiting or diarrhea.  GENITOURINARY:  No dysuria, frequency or urgency.  MUSCULOSKELETAL:  As per HPI.  SKIN:  No change in skin, hair or nails.  NEUROLOGIC:  No paresthesias, fasciculations, seizures or weakness.  PSYCHIATRIC:  No disorder of thought or mood.  ENDOCRINE:  No heat or cold intolerance, polyuria or polydipsia.  HEMATOLOGICAL:  No easy bruising or bleedings:  .     PHYSICAL EXAMINATION:    GENERAL APPEARANCE:  Pt. is not currently dyspneic, in no distress. Pt. is alert, oriented, and pleasant.  HEENT:  Pupils are normal and react normally. No icterus. Mucous membranes well colored.  NECK:  Supple. No lymphadenopathy. Jugular venous pressure not elevated. Carotids equal.   HEART:   The cardiac impulse has a normal quality. Regular. Normal S1 and S2. There are no murmurs, rubs or gallops noted  CHEST:  Chest is clear to auscultation. decreased aud BS's.  ABDOMEN:  Soft and nontender.   EXTREMITIES:  There is no cyanosis, clubbing or edema.   SKIN:  No rash or significant lesions are noted.  Neuro: Awake.      LABS:                        6.8    x     )-----------( x        ( 16 Jul 2019 09:09 )             22.9     07-16    143  |  108  |  15  ----------------------------<  84  3.5   |  28  |  0.86    Ca    8.1<L>      16 Jul 2019 07:05    TPro  7.6  /  Alb  3.0<L>  /  TBili  0.5  /  DBili  x   /  AST  19  /  ALT  11<L>  /  AlkPhos  93  07-15    LIVER FUNCTIONS - ( 15 Jul 2019 13:15 )  Alb: 3.0 g/dL / Pro: 7.6 gm/dL / ALK PHOS: 93 U/L / ALT: 11 U/L / AST: 19 U/L / GGT: x           PT/INR - ( 15 Jul 2019 13:15 )   PT: 11.4 sec;   INR: 1.03 ratio         PTT - ( 15 Jul 2019 13:15 )  PTT:28.4 sec  CARDIAC MARKERS ( 16 Jul 2019 00:52 )  0.031 ng/mL / x     / x     / x     / x      CARDIAC MARKERS ( 15 Jul 2019 20:21 )  0.028 ng/mL / x     / x     / x     / x      CARDIAC MARKERS ( 15 Jul 2019 13:15 )  0.035 ng/mL / x     / x     / x     / x            EXAM:  CT CHEST                            PROCEDURE DATE:  07/20/2019          INTERPRETATION:  CLINICAL INFORMATION: Abnormal chest x-ray. 89-year-old   woman status post thoracentesis    COMPARISON: July 15, 2019    PROCEDURE:   CT of the Chest was performed without intravenous contrast.  Sagittal and coronal reformats were performed.      FINDINGS:    LOWER NECK: Within normal limits.  AXILLA, MEDIASTINUM AND HARRIET: No lymphadenopathy.  VESSELS: Atherosclerotic arterial calcifications, including diffuse   calcification of the coronary arteries.  Normal caliber aorta.  HEART: The heart is normal in size.  No pericardial effusion.  PLEURA: Small bilateral pleural effusions, markedly decreased in size on   the left, status post thoracentesis, unchanged on the right.  LUNGS AND LARGE AIRWAYS: Large airspace consolidation involving the   superior and inferior lingula and the left lower lobe. No suspicious   nodule or mass. Patent central airways.   VISUALIZED UPPER ABDOMEN: Within normal limits.  BONES: No acute abnormality. Old bilateral rib fractures.  CHEST WALL:  Left chest wall unipolar pacemaker.    IMPRESSION:     Left upper and lower lobe pneumonia.  Small bilateral pleural effusions.
HPI:  89 y/o F with PMH of asthma and PPM presents with c/o progressively worsening shortness of breath for past 2 days. History provided by 2 daughters via  phone service. Pt reportedly has severe asthma and was outside on Saturday for a birthday party. Pt does not do well in the heat and pt began complaining of worsening shortness of breath. Pt reportedly has had swelling of bilateral lower extremities, face and left breast. Pt has been seeing a dermatologist and allergist for many years for pruritic rash. Pt last saw her cardiologist couple months ago and states everything was normal. Pt reportedly had PPM placed over 20 years ago but daughter states it was never turned on as she didn't need one. Denies history of arrhythmias, AFIB, CHF. Daughter states that pt has no cardiac problems. Pt denies chest pain, nausea, vomiting, abdominal pain, diarrhea.    Daughters and patient are poor historians, unable to obtain accurate history due to discrepancies in reported history. (15 Jul 2019 18:05)    7/16: in bed, no distress.   7/17: no distress.       PAST MEDICAL & SURGICAL HISTORY:  Asthma  History of permanent cardiac pacemaker placement      MEDICATIONS  (STANDING):  digoxin     Tablet 0.125 milliGRAM(s) Oral daily  furosemide   Injectable 40 milliGRAM(s) IV Push every 12 hours  guaiFENesin  milliGRAM(s) Oral every 12 hours  ipratropium    for Nebulization 500 MICROGram(s) Nebulizer every 6 hours    MEDICATIONS  (PRN):      Allergies    aspirin (Other (Severe))    Intolerances        SOCIAL HISTORY: Denies tobacco, etoh abuse or illicit drug use    FAMILY HISTORY:      Vital Signs Last 24 Hrs  T(C): 36.4 (16 Jul 2019 04:55), Max: 36.7 (15 Jul 2019 19:11)  T(F): 97.6 (16 Jul 2019 04:55), Max: 98.1 (15 Jul 2019 21:16)  HR: 100 (16 Jul 2019 04:55) (54 - 122)  BP: 116/62 (16 Jul 2019 04:55) (106/90 - 165/98)  BP(mean): --  RR: 19 (16 Jul 2019 04:55) (18 - 24)  SpO2: 90% (16 Jul 2019 04:55) (90% - 100%)    REVIEW OF SYSTEMS:    CONSTITUTIONAL:  As per HPI.  HEENT:  Eyes:  No diplopia or blurred vision. ENT:  No earache, sore throat or runny nose.  CARDIOVASCULAR:  No pressure, squeezing, tightness, heaviness or aching about the chest, neck, axilla or epigastrium.  RESPIRATORY:  No cough, shortness of breath, PND or orthopnea.  GASTROINTESTINAL:  No nausea, vomiting or diarrhea.  GENITOURINARY:  No dysuria, frequency or urgency.  MUSCULOSKELETAL:  As per HPI.  SKIN:  No change in skin, hair or nails.  NEUROLOGIC:  No paresthesias, fasciculations, seizures or weakness.  PSYCHIATRIC:  No disorder of thought or mood.  ENDOCRINE:  No heat or cold intolerance, polyuria or polydipsia.  HEMATOLOGICAL:  No easy bruising or bleedings:  .     PHYSICAL EXAMINATION:    GENERAL APPEARANCE:  Pt. is not currently dyspneic, in no distress. Pt. is alert, oriented, and pleasant.  HEENT:  Pupils are normal and react normally. No icterus. Mucous membranes well colored.  NECK:  Supple. No lymphadenopathy. Jugular venous pressure not elevated. Carotids equal.   HEART:   The cardiac impulse has a normal quality. Regular. Normal S1 and S2. There are no murmurs, rubs or gallops noted  CHEST:  Chest is clear to auscultation. Decreased aud BS's on L greater than R base.  ABDOMEN:  Soft and nontender.   EXTREMITIES:  There is no cyanosis, clubbing or edema.   SKIN:  No rash or significant lesions are noted.  Neuro: Alert, awake, and O x 3.      LABS:                        6.8    x     )-----------( x        ( 16 Jul 2019 09:09 )             22.9     07-16    143  |  108  |  15  ----------------------------<  84  3.5   |  28  |  0.86    Ca    8.1<L>      16 Jul 2019 07:05    TPro  7.6  /  Alb  3.0<L>  /  TBili  0.5  /  DBili  x   /  AST  19  /  ALT  11<L>  /  AlkPhos  93  07-15    LIVER FUNCTIONS - ( 15 Jul 2019 13:15 )  Alb: 3.0 g/dL / Pro: 7.6 gm/dL / ALK PHOS: 93 U/L / ALT: 11 U/L / AST: 19 U/L / GGT: x           PT/INR - ( 15 Jul 2019 13:15 )   PT: 11.4 sec;   INR: 1.03 ratio         PTT - ( 15 Jul 2019 13:15 )  PTT:28.4 sec  CARDIAC MARKERS ( 16 Jul 2019 00:52 )  0.031 ng/mL / x     / x     / x     / x      CARDIAC MARKERS ( 15 Jul 2019 20:21 )  0.028 ng/mL / x     / x     / x     / x      CARDIAC MARKERS ( 15 Jul 2019 13:15 )  0.035 ng/mL / x     / x     / x     / x                RADIOLOGY & ADDITIONAL STUDIES:     EXAM:  CT CHEST                            PROCEDURE DATE:  07/15/2019          INTERPRETATION:  Clinical information: Congestive heart failure    COMPARISON: None    PROCEDURE:   CT of the Chest was performed without intravenous contrast.  Sagittal and coronal reformats were performed.      FINDINGS:    LOWER NECK: Within normal limits.  AXILLA, MEDIASTINUM AND HARRIET: No lymphadenopathy.  VESSELS: Atherosclerotic arterial calcifications, including the coronary   arteries.  Normal caliber aorta.  HEART: The heart is moderately enlarged.  No pericardial effusion.  PLEURA: Large left and small right pleural effusions.  LUNGS AND LARGE AIRWAYS: Extensive compressive atelectasis in the left   lung with only a small amount of aerated lung in the left upper lobe. No   evidence of pneumonia or mass. Right lower lobe calcified granuloma. Mild   bilateral lower lobe mucoid impaction.  VISUALIZED UPPER ABDOMEN: Within normal limits.  BONES: Multiple old bilateral rib fractures. Mild L1 compression  deformity, age indeterminate.  CHEST WALL:  Left chest wall unipolar pacemaker.    IMPRESSION: Large left and small right pleural effusions. Extensive left   lung compressive atelectasis.
CC: Shortness of breath (18 Jul 2019 15:55)    HPI:  89 y/o F with PMH of asthma and PPM presents with c/o progressively worsening shortness of breath for past 2 days. History provided by 2 daughters via  phone service. Pt reportedly has severe asthma and was outside on Saturday for a birthday party. Pt does not do well in the heat and pt began complaining of worsening shortness of breath. Pt reportedly has had swelling of bilateral lower extremities, face and left breast. Pt has been seeing a dermatologist and allergist for many years for pruritic rash. Pt last saw her cardiologist couple months ago and states everything was normal. Pt reportedly had PPM placed over 20 years ago but daughter states it was never turned on as she didn't need one. Denies history of arrhythmias, AFIB, CHF. Daughter states that pt has no cardiac problems. Pt denies chest pain, nausea, vomiting, abdominal pain, diarrhea.      INTERVAL HPI/ OVERNIGHT EVENTS: Pt was seen and examined, family at bedside. Pt hs/p thoracentesis today, 900ml removed. Pt tolerated procedure well, had sedation. As per family Pt more awake now, looks comfortable. Pt is frequently calling out, but does not offer any complains.  POC discussed, will await for results and discuss further management with Pulm. Has opacity on CXR after procedure. Not febrile  07/20/19: Patient seen and examined. No new complaints.  07/21/19: patient seen and examined. Lying in bed without any distress.        Vital Signs Last 24 Hrs  T(C): --  T(F): --  HR: 101 (20 Jul 2019 20:18) (99 - 101)  BP: --  BP(mean): --  RR: --  SpO2: --    REVIEW OF SYSTEMS:  Unable to obtain due to Pt has dementia     PHYSICAL EXAM:  General: Well developed; malnourished, frail elderly female,  in no acute distress  Eyes: PERRLA, EOMI; conjunctiva and sclera clear  Head: Normocephalic; atraumatic  ENMT: No nasal discharge; airway clear  Neck: Supple; no JVD   Chest: no skin changes, no lumps or nodules palpated.   Respiratory: Slightly improved BS on L.  No wheezes, rales or rhonchi  Cardiovascular: Regular rate and rhythm. S1 and S2 Normal; No murmurs  Gastrointestinal: Soft non-tender non-distended; Normal bowel sounds  Genitourinary: No suprapubic  tenderness  Extremities:  edema improved  Vascular: Peripheral pulses palpable 2+ bilaterally  Neurological: Alert and oriented x 1-2, non focal, confused   Skin: Warm and dry.  + rash LE/UE with scratch marks and excoriations, dry, no discharge   Lymph Nodes: No acute cervical adenopathy  Musculoskeletal: Normal  muscle tone  Psychiatric: Cooperative      LABS:                                          8.9    6.74  )-----------( 260      ( 21 Jul 2019 07:05 )             29.5     21 Jul 2019 07:05    141    |  100    |  19     ----------------------------<  82     3.3     |  35     |  1.16     Ca    8.9        21 Jul 2019 07:05    TPro  x      /  Alb  2.4    /  TBili  x      /  DBili  x      /  AST  x      /  ALT  x      /  AlkPhos  x      19 Jul 2019 16:45    LIVER FUNCTIONS - ( 19 Jul 2019 16:45 )  Alb: 2.4 g/dL / Pro: x     / ALK PHOS: x     / ALT: x     / AST: x     / GGT: x                           MEDICATIONS  (STANDING):  ammonium lactate 12% Lotion 1 Application(s) Topical two times a day  cefepime  Injectable. 1000 milliGRAM(s) IV Push every 12 hours  cefepime  Injectable.      cyanocobalamin 1000 MICROGram(s) Oral daily  furosemide   Injectable 40 milliGRAM(s) IV Push every 12 hours  guaiFENesin  milliGRAM(s) Oral every 12 hours  hydrocortisone 2.5% Lotion 1 Application(s) Topical two times a day  hydroxyzine Hcl 10 milliGRAM(s) 10 milliGRAM(s) Oral two times a day  ipratropium    for Nebulization 500 MICROGram(s) Nebulizer every 6 hours  metoprolol tartrate 25 milliGRAM(s) Oral two times a day  montelukast 10 milliGRAM(s) Oral daily  pantoprazole  Injectable 40 milliGRAM(s) IV Push two times a day  potassium chloride   Powder 40 milliEquivalent(s) Oral every 4 hours  senna 2 Tablet(s) Oral at bedtime  sodium ferric gluconate complex IVPB 125 milliGRAM(s) IV Intermittent <User Schedule>  traZODone 50 milliGRAM(s) Oral at bedtime    MEDICATIONS  (PRN):  polyethylene glycol 3350 17 Gram(s) Oral daily PRN Constipation
Came to see pt but she is off floor for IR procedure  will reassess in AM  d/w RN
HPI:  89 y/o F with PMH of asthma and PPM presents with c/o progressively worsening shortness of breath for past 2 days. History provided by 2 daughters via  phone service. Pt reportedly has severe asthma and was outside on Saturday for a birthday party. Pt does not do well in the heat and pt began complaining of worsening shortness of breath. Pt reportedly has had swelling of bilateral lower extremities, face and left breast. Pt has been seeing a dermatologist and allergist for many years for pruritic rash. Pt last saw her cardiologist couple months ago and states everything was normal. Pt reportedly had PPM placed over 20 years ago but daughter states it was never turned on as she didn't need one. Denies history of arrhythmias, AFIB, CHF. Daughter states that pt has no cardiac problems. Pt denies chest pain, nausea, vomiting, abdominal pain, diarrhea.    Daughters and patient are poor historians, unable to obtain accurate history due to discrepancies in reported history. (15 Jul 2019 18:05)    7/16: in bed, no distress.   7/17: no distress.   7/18: no distress, receiving neb treatment. in bed.   7/20: in bed, no distress.      PAST MEDICAL & SURGICAL HISTORY:  Asthma  History of permanent cardiac pacemaker placement      MEDICATIONS  (STANDING):  digoxin     Tablet 0.125 milliGRAM(s) Oral daily  furosemide   Injectable 40 milliGRAM(s) IV Push every 12 hours  guaiFENesin  milliGRAM(s) Oral every 12 hours  ipratropium    for Nebulization 500 MICROGram(s) Nebulizer every 6 hours    MEDICATIONS  (PRN):      Allergies    aspirin (Other (Severe))    Intolerances        SOCIAL HISTORY: Denies tobacco, etoh abuse or illicit drug use    FAMILY HISTORY:      Vital Signs Last 24 Hrs  T(C): 36.4 (16 Jul 2019 04:55), Max: 36.7 (15 Jul 2019 19:11)  T(F): 97.6 (16 Jul 2019 04:55), Max: 98.1 (15 Jul 2019 21:16)  HR: 100 (16 Jul 2019 04:55) (54 - 122)  BP: 116/62 (16 Jul 2019 04:55) (106/90 - 165/98)  BP(mean): --  RR: 19 (16 Jul 2019 04:55) (18 - 24)  SpO2: 90% (16 Jul 2019 04:55) (90% - 100%)    REVIEW OF SYSTEMS:    CONSTITUTIONAL:  As per HPI.  HEENT:  Eyes:  No diplopia or blurred vision. ENT:  No earache, sore throat or runny nose.  CARDIOVASCULAR:  No pressure, squeezing, tightness, heaviness or aching about the chest, neck, axilla or epigastrium.  RESPIRATORY:  No cough, shortness of breath, PND or orthopnea.  GASTROINTESTINAL:  No nausea, vomiting or diarrhea.  GENITOURINARY:  No dysuria, frequency or urgency.  MUSCULOSKELETAL:  As per HPI.  SKIN:  No change in skin, hair or nails.  NEUROLOGIC:  No paresthesias, fasciculations, seizures or weakness.  PSYCHIATRIC:  No disorder of thought or mood.  ENDOCRINE:  No heat or cold intolerance, polyuria or polydipsia.  HEMATOLOGICAL:  No easy bruising or bleedings:  .     PHYSICAL EXAMINATION:    GENERAL APPEARANCE:  Pt. is not currently dyspneic, in no distress. Pt. is alert, oriented, and pleasant.  HEENT:  Pupils are normal and react normally. No icterus. Mucous membranes well colored.  NECK:  Supple. No lymphadenopathy. Jugular venous pressure not elevated. Carotids equal.   HEART:   The cardiac impulse has a normal quality. Regular. Normal S1 and S2. There are no murmurs, rubs or gallops noted  CHEST:  Chest is clear to auscultation. decreased aud BS's.  ABDOMEN:  Soft and nontender.   EXTREMITIES:  There is no cyanosis, clubbing or edema.   SKIN:  No rash or significant lesions are noted.  Neuro: Alert, awake, and O x 3.      LABS:                        6.8    x     )-----------( x        ( 16 Jul 2019 09:09 )             22.9     07-16    143  |  108  |  15  ----------------------------<  84  3.5   |  28  |  0.86    Ca    8.1<L>      16 Jul 2019 07:05    TPro  7.6  /  Alb  3.0<L>  /  TBili  0.5  /  DBili  x   /  AST  19  /  ALT  11<L>  /  AlkPhos  93  07-15    LIVER FUNCTIONS - ( 15 Jul 2019 13:15 )  Alb: 3.0 g/dL / Pro: 7.6 gm/dL / ALK PHOS: 93 U/L / ALT: 11 U/L / AST: 19 U/L / GGT: x           PT/INR - ( 15 Jul 2019 13:15 )   PT: 11.4 sec;   INR: 1.03 ratio         PTT - ( 15 Jul 2019 13:15 )  PTT:28.4 sec  CARDIAC MARKERS ( 16 Jul 2019 00:52 )  0.031 ng/mL / x     / x     / x     / x      CARDIAC MARKERS ( 15 Jul 2019 20:21 )  0.028 ng/mL / x     / x     / x     / x      CARDIAC MARKERS ( 15 Jul 2019 13:15 )  0.035 ng/mL / x     / x     / x     / x            EXAM:  CT CHEST                            PROCEDURE DATE:  07/20/2019          INTERPRETATION:  CLINICAL INFORMATION: Abnormal chest x-ray. 89-year-old   woman status post thoracentesis    COMPARISON: July 15, 2019    PROCEDURE:   CT of the Chest was performed without intravenous contrast.  Sagittal and coronal reformats were performed.      FINDINGS:    LOWER NECK: Within normal limits.  AXILLA, MEDIASTINUM AND HARRIET: No lymphadenopathy.  VESSELS: Atherosclerotic arterial calcifications, including diffuse   calcification of the coronary arteries.  Normal caliber aorta.  HEART: The heart is normal in size.  No pericardial effusion.  PLEURA: Small bilateral pleural effusions, markedly decreased in size on   the left, status post thoracentesis, unchanged on the right.  LUNGS AND LARGE AIRWAYS: Large airspace consolidation involving the   superior and inferior lingula and the left lower lobe. No suspicious   nodule or mass. Patent central airways.   VISUALIZED UPPER ABDOMEN: Within normal limits.  BONES: No acute abnormality. Old bilateral rib fractures.  CHEST WALL:  Left chest wall unipolar pacemaker.    IMPRESSION:     Left upper and lower lobe pneumonia.  Small bilateral pleural effusions.
HPI:  89 y/o F with PMH of asthma and PPM presents with c/o progressively worsening shortness of breath for past 2 days. History provided by 2 daughters via  phone service. Pt reportedly has severe asthma and was outside on Saturday for a birthday party. Pt does not do well in the heat and pt began complaining of worsening shortness of breath. Pt reportedly has had swelling of bilateral lower extremities, face and left breast. Pt has been seeing a dermatologist and allergist for many years for pruritic rash. Pt last saw her cardiologist couple months ago and states everything was normal. Pt reportedly had PPM placed over 20 years ago but daughter states it was never turned on as she didn't need one. Denies history of arrhythmias, AFIB, CHF. Daughter states that pt has no cardiac problems. Pt denies chest pain, nausea, vomiting, abdominal pain, diarrhea.    Daughters and patient are poor historians, unable to obtain accurate history due to discrepancies in reported history. (15 Jul 2019 18:05)    7/16: in bed, no distress.   7/17: no distress.   7/18: no distress, receiving neb treatment. in bed.       PAST MEDICAL & SURGICAL HISTORY:  Asthma  History of permanent cardiac pacemaker placement      MEDICATIONS  (STANDING):  digoxin     Tablet 0.125 milliGRAM(s) Oral daily  furosemide   Injectable 40 milliGRAM(s) IV Push every 12 hours  guaiFENesin  milliGRAM(s) Oral every 12 hours  ipratropium    for Nebulization 500 MICROGram(s) Nebulizer every 6 hours    MEDICATIONS  (PRN):      Allergies    aspirin (Other (Severe))    Intolerances        SOCIAL HISTORY: Denies tobacco, etoh abuse or illicit drug use    FAMILY HISTORY:      Vital Signs Last 24 Hrs  T(C): 36.4 (16 Jul 2019 04:55), Max: 36.7 (15 Jul 2019 19:11)  T(F): 97.6 (16 Jul 2019 04:55), Max: 98.1 (15 Jul 2019 21:16)  HR: 100 (16 Jul 2019 04:55) (54 - 122)  BP: 116/62 (16 Jul 2019 04:55) (106/90 - 165/98)  BP(mean): --  RR: 19 (16 Jul 2019 04:55) (18 - 24)  SpO2: 90% (16 Jul 2019 04:55) (90% - 100%)    REVIEW OF SYSTEMS:    CONSTITUTIONAL:  As per HPI.  HEENT:  Eyes:  No diplopia or blurred vision. ENT:  No earache, sore throat or runny nose.  CARDIOVASCULAR:  No pressure, squeezing, tightness, heaviness or aching about the chest, neck, axilla or epigastrium.  RESPIRATORY:  No cough, shortness of breath, PND or orthopnea.  GASTROINTESTINAL:  No nausea, vomiting or diarrhea.  GENITOURINARY:  No dysuria, frequency or urgency.  MUSCULOSKELETAL:  As per HPI.  SKIN:  No change in skin, hair or nails.  NEUROLOGIC:  No paresthesias, fasciculations, seizures or weakness.  PSYCHIATRIC:  No disorder of thought or mood.  ENDOCRINE:  No heat or cold intolerance, polyuria or polydipsia.  HEMATOLOGICAL:  No easy bruising or bleedings:  .     PHYSICAL EXAMINATION:    GENERAL APPEARANCE:  Pt. is not currently dyspneic, in no distress. Pt. is alert, oriented, and pleasant.  HEENT:  Pupils are normal and react normally. No icterus. Mucous membranes well colored.  NECK:  Supple. No lymphadenopathy. Jugular venous pressure not elevated. Carotids equal.   HEART:   The cardiac impulse has a normal quality. Regular. Normal S1 and S2. There are no murmurs, rubs or gallops noted  CHEST:  Chest is clear to auscultation. Decreased aud BS's on L greater than R base.  ABDOMEN:  Soft and nontender.   EXTREMITIES:  There is no cyanosis, clubbing or edema.   SKIN:  No rash or significant lesions are noted.  Neuro: Alert, awake, and O x 3.      LABS:                        6.8    x     )-----------( x        ( 16 Jul 2019 09:09 )             22.9     07-16    143  |  108  |  15  ----------------------------<  84  3.5   |  28  |  0.86    Ca    8.1<L>      16 Jul 2019 07:05    TPro  7.6  /  Alb  3.0<L>  /  TBili  0.5  /  DBili  x   /  AST  19  /  ALT  11<L>  /  AlkPhos  93  07-15    LIVER FUNCTIONS - ( 15 Jul 2019 13:15 )  Alb: 3.0 g/dL / Pro: 7.6 gm/dL / ALK PHOS: 93 U/L / ALT: 11 U/L / AST: 19 U/L / GGT: x           PT/INR - ( 15 Jul 2019 13:15 )   PT: 11.4 sec;   INR: 1.03 ratio         PTT - ( 15 Jul 2019 13:15 )  PTT:28.4 sec  CARDIAC MARKERS ( 16 Jul 2019 00:52 )  0.031 ng/mL / x     / x     / x     / x      CARDIAC MARKERS ( 15 Jul 2019 20:21 )  0.028 ng/mL / x     / x     / x     / x      CARDIAC MARKERS ( 15 Jul 2019 13:15 )  0.035 ng/mL / x     / x     / x     / x                RADIOLOGY & ADDITIONAL STUDIES:     EXAM:  CT CHEST                            PROCEDURE DATE:  07/15/2019          INTERPRETATION:  Clinical information: Congestive heart failure    COMPARISON: None    PROCEDURE:   CT of the Chest was performed without intravenous contrast.  Sagittal and coronal reformats were performed.      FINDINGS:    LOWER NECK: Within normal limits.  AXILLA, MEDIASTINUM AND HARRIET: No lymphadenopathy.  VESSELS: Atherosclerotic arterial calcifications, including the coronary   arteries.  Normal caliber aorta.  HEART: The heart is moderately enlarged.  No pericardial effusion.  PLEURA: Large left and small right pleural effusions.  LUNGS AND LARGE AIRWAYS: Extensive compressive atelectasis in the left   lung with only a small amount of aerated lung in the left upper lobe. No   evidence of pneumonia or mass. Right lower lobe calcified granuloma. Mild   bilateral lower lobe mucoid impaction.  VISUALIZED UPPER ABDOMEN: Within normal limits.  BONES: Multiple old bilateral rib fractures. Mild L1 compression  deformity, age indeterminate.  CHEST WALL:  Left chest wall unipolar pacemaker.    IMPRESSION: Large left and small right pleural effusions. Extensive left   lung compressive atelectasis.
PCP:    REQUESTING PHYSICIAN:    REASON FOR CONSULT:    CHIEF COMPLAINT:    HPI:  89 y/o F with PMH of asthma HTN and ? tachyarrhythmia  PPM for ?presents with c/o progressively worsening shortness of breath for past 2 days. History provided by daughter at bedside  Pt reportedly has severe asthma and was outside on Saturday for a birthday party in wheel chair . Pt does not do well in the heat and pt began complaining of worsening shortness of breath. Pt reportedly has had swelling of bilateral lower extremities, face and left breast. Pt has been seeing a dermatologist and allergist for many years for pruritic rash. Pt last saw her cardiologist couple months ago and states everything was normal. Denies history of arrhythmias, AFIB, CHF. Daughter states that pt has no cardiac problems. Pt denies chest pain, nausea, vomiting, abdominal pain, diarrhea.    Daughters and patient are poor historians, unable to obtain accurate history due to discrepancies in reported history.  , patient is comfortable in bed , dropped her hemoglobin significantly , scheduled to receive  PRBC     19: Pt awake and eating. Family at bedside feels improved. Pt denies chest pain or palpitations.     PAST MEDICAL & SURGICAL HISTORY:  Asthma  History of permanent cardiac pacemaker placement      SOCIAL HISTORY:    FAMILY HISTORY:      ALLERGIES:  Allergies    aspirin (Other (Severe))    Intolerances        MEDICATIONS:    MEDICATIONS  (STANDING):  furosemide   Injectable 40 milliGRAM(s) IV Push every 12 hours  guaiFENesin  milliGRAM(s) Oral every 12 hours  hydroxyzine Hcl 10 milliGRAM(s) 10 milliGRAM(s) Oral two times a day  ipratropium    for Nebulization 500 MICROGram(s) Nebulizer every 6 hours  metoprolol tartrate 12.5 milliGRAM(s) Oral every 12 hours  montelukast 10 milliGRAM(s) Oral daily  pantoprazole  Injectable 40 milliGRAM(s) IV Push two times a day  sodium ferric gluconate complex IVPB 125 milliGRAM(s) IV Intermittent <User Schedule>  traZODone 50 milliGRAM(s) Oral at bedtime    MEDICATIONS  (PRN):        Vital Signs Last 24 Hrs  T(C): 36.4 (2019 04:56), Max: 36.7 (2019 15:30)  T(F): 97.6 (2019 04:56), Max: 98 (2019 15:30)  HR: 121 (2019 04:56) (84 - 121)  BP: 156/117 (2019 04:56) (145/69 - 156/117)  BP(mean): --  RR: 16 (2019 04:56) (16 - 18)  SpO2: 84% (2019 04:56) (84% - 98%)Daily     Daily Weight in k.6 (2019 08:29)I&O's Summary    2019 07:01  -  2019 07:00  --------------------------------------------------------  IN: 323 mL / OUT: 0 mL / NET: 323 mL        PHYSICAL EXAM:    Constitutional: NAD, awake and alert, well-developed  HEENT: PERR, EOMI,  No oral cyananosis.  Neck:  supple,  No JVD  Respiratory: Breath sounds are clear bilaterally, No wheezing, rales or rhonchi  Cardiovascular: S1 and S2, regular rate and rhythm, no Murmurs, gallops or rubs  Gastrointestinal: Bowel Sounds present, soft, nontender.   Extremities: No peripheral edema. No clubbing or cyanosis.  Vascular: 2+ peripheral pulses  Neurological: A/O x 3, no focal deficits  Musculoskeletal: no calf tenderness.  Skin: No rashes.      LABS: All Labs Reviewed:                        8.4    5.64  )-----------( 257      ( 2019 07:37 )             27.1                         9.0    x     )-----------( x        ( 2019 20:05 )             29.4                         6.8    x     )-----------( x        ( 2019 09:09 )             22.9     2019 07:37    145    |  107    |  13     ----------------------------<  77     3.2     |  33     |  0.84   2019 07:05    143    |  108    |  15     ----------------------------<  84     3.5     |  28     |  0.86   15 Jul 2019 13:15    145    |  109    |  17     ----------------------------<  120    3.9     |  27     |  1.04     Ca    7.6        2019 07:37  Ca    8.1        2019 07:05  Ca    9.0        15 Jul 2019 13:15  Phos  3.1       2019 07:37    TPro  x      /  Alb  2.1    /  TBili  x      /  DBili  x      /  AST  x      /  ALT  x      /  AlkPhos  x      2019 07:37  TPro  7.6    /  Alb  3.0    /  TBili  0.5    /  DBili  x      /  AST  19     /  ALT  11     /  AlkPhos  93     15 Jul 2019 13:15    PT/INR - ( 15 Jul 2019 13:15 )   PT: 11.4 sec;   INR: 1.03 ratio         PTT - ( 15 Jul 2019 13:15 )  PTT:28.4 sec  CARDIAC MARKERS ( 2019 00:52 )  0.031 ng/mL / x     / x     / x     / x      CARDIAC MARKERS ( 15 Jul 2019 20:21 )  0.028 ng/mL / x     / x     / x     / x      CARDIAC MARKERS ( 15 Jul 2019 13:15 )  0.035 ng/mL / x     / x     / x     / x          Blood Culture:   -15 @ 13:15  Pro Bnp [id number]    -16 @ 07:05  TSH: 4.04      RADIOLOGY/EKG:      ECHO/CARDIAC CATHTERIZATION/STRESS TEST:< from: Transthoracic Echocardiogram (19 @ 08:24) >  Summary     Endocardium is not always well visualized, however, overall left   ventricular systolic function appears normal. Technically Difficult   Study.   Estimated left ventricular ejection fraction is 55-60%.   Normal appearing right ventricle structure and function.   A device wire is seen in the RV and RA.     There are fibrocalcific changes noted to theaortic valve leaflets with   minimal restriction in leaflet excursion.   Transaortic gradients are elevated but do not meet the criteria for   aortic   stenosis.   Mild aortic insufficiency noted.   Fibrocalcific changes noted to the mitral valve leaflets with preserved   leaflet excursion.   Mild mitral annular calcification is present.   Mild (1+) mitral regurgitation is present.   Mild (1+) tricuspid valve regurgitation is present.     No evidence of pericardial effusion.     Signature     ----------------------------------------------------------------   Electronically signed by Andre Hanson DO([facility]   physician) on 2019 05:01 PM   ----------------------------------------------------------------    < end of copied text >
PCP:    REQUESTING PHYSICIAN:    REASON FOR CONSULT:    CHIEF COMPLAINT:    HPI:  89 y/o F with PMH of asthma HTN and ? tachyarrhythmia  PPM for ?presents with c/o progressively worsening shortness of breath for past 2 days. History provided by daughter at bedside  Pt reportedly has severe asthma and was outside on Saturday for a birthday party in wheel chair . Pt does not do well in the heat and pt began complaining of worsening shortness of breath. Pt reportedly has had swelling of bilateral lower extremities, face and left breast. Pt has been seeing a dermatologist and allergist for many years for pruritic rash. Pt last saw her cardiologist couple months ago and states everything was normal. Denies history of arrhythmias, AFIB, CHF. Daughter states that pt has no cardiac problems. Pt denies chest pain, nausea, vomiting, abdominal pain, diarrhea.    Daughters and patient are poor historians, unable to obtain accurate history due to discrepancies in reported history.  , patient is comfortable in bed , dropped her hemoglobin significantly , scheduled to receive  PRBC     19: Pt awake and eating. Family at bedside feels improved. Pt denies chest pain or palpitations.   19: Pt awake and more comfortable. Dtr at bedside.    PAST MEDICAL & SURGICAL HISTORY:  Asthma  History of permanent cardiac pacemaker placement      SOCIAL HISTORY:    FAMILY HISTORY:      ALLERGIES:  Allergies    aspirin (Other (Severe))    Intolerances        MEDICATIONS:    MEDICATIONS  (STANDING):  ammonium lactate 12% Lotion 1 Application(s) Topical two times a day  furosemide   Injectable 40 milliGRAM(s) IV Push every 12 hours  guaiFENesin  milliGRAM(s) Oral every 12 hours  hydroxyzine Hcl 10 milliGRAM(s) 10 milliGRAM(s) Oral two times a day  ipratropium    for Nebulization 500 MICROGram(s) Nebulizer every 6 hours  metoprolol tartrate 25 milliGRAM(s) Oral two times a day  montelukast 10 milliGRAM(s) Oral daily  pantoprazole  Injectable 40 milliGRAM(s) IV Push two times a day  sodium ferric gluconate complex IVPB 125 milliGRAM(s) IV Intermittent <User Schedule>  traZODone 50 milliGRAM(s) Oral at bedtime    MEDICATIONS  (PRN):        Vital Signs Last 24 Hrs  T(C): 36.7 (2019 10:35), Max: 36.8 (2019 16:30)  T(F): 98 (2019 10:35), Max: 98.3 (2019 16:30)  HR: 101 (2019 14:33) (77 - 104)  BP: 130/46 (2019 10:35) (130/46 - 135/55)  BP(mean): --  RR: 18 (2019 10:35) (17 - 18)  SpO2: 96% (2019 10:35) (93% - 96%)Daily     Daily Weight in k.4 (2019 08:15)I&O's Summary      PHYSICAL EXAM:    Constitutional: NAD, awake and alert, well-developed  HEENT: PERR, EOMI,  No oral cyananosis.  Neck:  supple,  No JVD  Respiratory: Breath sounds are clear bilaterally, No wheezing, rales or rhonchi  Cardiovascular: S1 and S2, regular rate and rhythm, no Murmurs, gallops or rubs  Gastrointestinal: Bowel Sounds present, soft, nontender.   Extremities: No peripheral edema. No clubbing or cyanosis.  Vascular: 2+ peripheral pulses  Neurological: A/O x 3, no focal deficits  Musculoskeletal: no calf tenderness.  Skin: No rashes.      LABS: All Labs Reviewed:                        8.5    6.23  )-----------( 257      ( 2019 07:26 )             27.9                         8.4    5.64  )-----------( 257      ( 2019 07:37 )             27.1                         9.0    x     )-----------( x        ( 2019 20:05 )             29.4     2019 07:26    144    |  106    |  12     ----------------------------<  78     3.8     |  31     |  0.88   2019 07:37    145    |  107    |  13     ----------------------------<  77     3.2     |  33     |  0.84   2019 07:05    143    |  108    |  15     ----------------------------<  84     3.5     |  28     |  0.86     Ca    8.1        2019 07:26  Ca    7.6        2019 07:37  Ca    8.1        2019 07:05  Phos  3.1       2019 07:37    TPro  x      /  Alb  2.1    /  TBili  x      /  DBili  x      /  AST  x      /  ALT  x      /  AlkPhos  x      2019 07:37          Blood Culture:      @ 07:05  TSH: 4.04      RADIOLOGY/EKG:      ECHO/CARDIAC CATHTERIZATION/STRESS TEST:  < from: 12 Lead ECG (07.15.19 @ 12:48) >  Diagnosis Line Sinus tachycardia with Premature atrial complexes  Low voltage QRS  Cannot rule out Anterior infarct , age undetermined  Abnormal ECG    Confirmed by PJ CONTRERAS MD ([id number]) on 2019 5:05:07     < end of copied text >  < from: Transthoracic Echocardiogram (19 @ 08:24) >   Impression     Summary     Endocardium is not always well visualized, however, overall left   ventricular systolic function appears normal. Technically Difficult   Study.   Estimated left ventricular ejection fraction is 55-60%.   Normal appearing right ventricle structure and function.   A device wire is seen in the RV and RA.     There are fibrocalcific changes noted to theaortic valve leaflets with   minimal restriction in leaflet excursion.   Transaortic gradients are elevated but do not meet the criteria for   aortic   stenosis.   Mild aortic insufficiency noted.   Fibrocalcific changes noted to the mitral valve leaflets with preserved   leaflet excursion.   Mild mitral annular calcification is present.   Mild (1+) mitral regurgitation is present.   Mild (1+) tricuspid valve regurgitation is present.     No evidence of pericardial effusion.     Signature     ----------------------------------------------------------------   Electronically signed by Andre Hanson DO(Interpreting   physician) on 2019 05:01 PM   ----------------------------------------------------------------    < end of copied text >
Subjective:  Patient is a 89y old  Female who presents with a chief complaint of Shortness of breath (2019 09:57)    HPI:  89 y/o F with PMH of asthma and PPM presents with c/o progressively worsening shortness of breath for past 2 days. History provided by 2 daughters via  phone service. Pt reportedly has severe asthma and was outside on Saturday for a birthday party. Pt does not do well in the heat and pt began complaining of worsening shortness of breath. Pt reportedly has had swelling of bilateral lower extremities, face and left breast. Pt has been seeing a dermatologist and allergist for many years for pruritic rash. Pt last saw her cardiologist couple months ago and states everything was normal. Pt reportedly had PPM placed over 20 years ago but daughter states it was never turned on as she didn't need one. Denies history of arrhythmias, AFIB, CHF. Daughter states that pt has no cardiac problems. Pt denies chest pain, nausea, vomiting, abdominal pain, diarrhea.  Daughters and patient are poor historians, unable to obtain accurate history due to discrepancies in reported history. (15 Jul 2019 18:05)    :  Pt seen.  D/w daughter and son.  Hgb came back @ 6.8 this morning.  No hx of bleeding or anemia in the past.  Repeat also 6.8.      Review of system- Rest of the review of system are negative except mentioned in HPI    OBJECTIVE:   T(C): 36.7 (19 @ 15:30), Max: 36.7 (07-15-19 @ 19:11)  HR: 90 (19 @ 15:30) (84 - 115)  BP: 147/73 (19 @ 15:30) (116/62 - 152/70)  RR: 16 (19 @ 15:30) (16 - 20)  SpO2: 94% (19 @ 15:30) (90% - 99%)  Wt(kg): --  Daily     Daily Weight in k (2019 14:34)    PHYSICAL EXAM:  GENERAL: NAD  NERVOUS SYSTEM: contracted.  NAD.  awake and alert.    HEAD:  Atraumatic, Normocephalic  EYES: EOMI, PERRLA, conjunctiva and sclera clear  HEENT: Moist mucous membranes  NECK: Supple, No JVD  CHEST/LUNG: decreased BS  HEART: Regular rate and rhythm; No murmurs, rubs, or gallops  ABDOMEN: Soft, Nontender, Nondistended; Bowel sounds present  GENITOURINARY- Voiding, no suprapubic tenderness  EXTREMITIES:  2+ Peripheral Pulses, No clubbing, cyanosis, or edema  MUSCULOSKELETAL:- contracted  SKIN-no rash, no lesion    LABS:                        6.8    x     )-----------( x        ( 2019 09:09 )             22.9     07-16    143  |  108  |  15  ----------------------------<  84  3.5   |  28  |  0.86    Ca    8.1<L>      2019 07:05  TPro  7.6  /  Alb  3.0<L>  /  TBili  0.5  /  DBili  x   /  AST  19  /  ALT  11<L>  /  AlkPhos  93  07-15  PT/INR - ( 15 Jul 2019 13:15 )   PT: 11.4 sec;   INR: 1.03 ratio      PTT - ( 15 Jul 2019 13:15 )  PTT:28.4 sec  CARDIAC MARKERS ( 2019 00:52 )  0.031 ng/mL / x     / x     / x     / x      CARDIAC MARKERS ( 15 Jul 2019 20:21 )  0.028 ng/mL / x     / x     / x     / x      CARDIAC MARKERS ( 15 Jul 2019 13:15 )  0.035 ng/mL / x     / x     / x     / x          Current medications:  furosemide   Injectable 40 milliGRAM(s) IV Push every 12 hours  guaiFENesin  milliGRAM(s) Oral every 12 hours  hydroxyzine Hcl 10 milliGRAM(s) 10 milliGRAM(s) Oral two times a day  ipratropium    for Nebulization 500 MICROGram(s) Nebulizer every 6 hours  metoprolol tartrate 12.5 milliGRAM(s) Oral every 12 hours  montelukast 10 milliGRAM(s) Oral daily  pantoprazole  Injectable 40 milliGRAM(s) IV Push two times a day  sodium ferric gluconate complex IVPB 125 milliGRAM(s) IV Intermittent <User Schedule>  traZODone 50 milliGRAM(s) Oral at bedtime
pt off floor    FOBT negative and hgb stable    will follow up Mon  please call if any GI issues over weekend.
pt seen and examined-full note to follow    microcytic anemia  check FOBT, trend CBC  given age and comorbid conditions should discuss GOC and consider conservative approach
Subjective:  Patient is a 89y old  Female who presents with a chief complaint of Shortness of breath (16 Jul 2019 09:57)    HPI:  89 y/o F with PMH of asthma and PPM presents with c/o progressively worsening shortness of breath for past 2 days. History provided by 2 daughters via  phone service. Pt reportedly has severe asthma and was outside on Saturday for a birthday party. Pt does not do well in the heat and pt began complaining of worsening shortness of breath. Pt reportedly has had swelling of bilateral lower extremities, face and left breast. Pt has been seeing a dermatologist and allergist for many years for pruritic rash. Pt last saw her cardiologist couple months ago and states everything was normal. Pt reportedly had PPM placed over 20 years ago but daughter states it was never turned on as she didn't need one. Denies history of arrhythmias, AFIB, CHF. Daughter states that pt has no cardiac problems. Pt denies chest pain, nausea, vomiting, abdominal pain, diarrhea.  Daughters and patient are poor historians, unable to obtain accurate history due to discrepancies in reported history. (15 Jul 2019 18:05)    7/16:  Pt seen.  D/w daughter and son.  Hgb came back @ 6.8 this morning.  No hx of bleeding or anemia in the past.  Repeat also 6.8.    7/17:  PT seen.  D/w family.  Pt unchanged.  No complaints.  Resting in bed.      Review of system- Rest of the review of system are negative except mentioned in HPI    Vital Signs Last 24 Hrs  T(C): 36.8 (17 Jul 2019 16:30), Max: 36.8 (17 Jul 2019 16:30)  T(F): 98.3 (17 Jul 2019 16:30), Max: 98.3 (17 Jul 2019 16:30)  HR: 93 (17 Jul 2019 16:30) (93 - 121)  BP: 131/89 (17 Jul 2019 16:30) (117/43 - 156/117)  BP(mean): --  RR: 17 (17 Jul 2019 16:30) (16 - 17)  SpO2: 93% (17 Jul 2019 16:30) (84% - 98%)    PHYSICAL EXAM:  GENERAL: NAD  NERVOUS SYSTEM: contracted.  NAD.  awake and alert.    HEAD:  Atraumatic, Normocephalic  EYES: EOMI, PERRLA, conjunctiva and sclera clear  HEENT: Moist mucous membranes  NECK: Supple, No JVD  CHEST/LUNG: decreased BS  HEART: Regular rate and rhythm; No murmurs, rubs, or gallops  ABDOMEN: Soft, Nontender, Nondistended; Bowel sounds present  GENITOURINARY- Voiding, no suprapubic tenderness  EXTREMITIES:  2+ Peripheral Pulses, No clubbing, cyanosis, or edema  MUSCULOSKELETAL:- contracted  SKIN-no rash, no lesion    LABS:  07-17    145  |  107  |  13  ----------------------------<  77  3.2<L>   |  33<H>  |  0.84    Ca    7.6<L>      17 Jul 2019 07:37  Phos  3.1     07-17    TPro  x   /  Alb  2.1<L>  /  TBili  x   /  DBili  x   /  AST  x   /  ALT  x   /  AlkPhos  x   07-17                        8.4    5.64  )-----------( 257      ( 17 Jul 2019 07:37 )             27.1     CARDIAC MARKERS ( 16 Jul 2019 00:52 )  0.031 ng/mL / x     / x     / x     / x      CARDIAC MARKERS ( 15 Jul 2019 20:21 )  0.028 ng/mL / x     / x     / x     / x            LIVER FUNCTIONS - ( 17 Jul 2019 07:37 )  Alb: 2.1 g/dL / Pro: x     / ALK PHOS: x     / ALT: x     / AST: x     / GGT: x           MEDICATIONS  (STANDING):  furosemide   Injectable 40 milliGRAM(s) IV Push every 12 hours  guaiFENesin  milliGRAM(s) Oral every 12 hours  hydroxyzine Hcl 10 milliGRAM(s) 10 milliGRAM(s) Oral two times a day  ipratropium    for Nebulization 500 MICROGram(s) Nebulizer every 6 hours  metoprolol tartrate 25 milliGRAM(s) Oral two times a day  montelukast 10 milliGRAM(s) Oral daily  pantoprazole  Injectable 40 milliGRAM(s) IV Push two times a day  sodium ferric gluconate complex IVPB 125 milliGRAM(s) IV Intermittent <User Schedule>  traZODone 50 milliGRAM(s) Oral at bedtime    MEDICATIONS  (PRN):

## 2019-07-21 NOTE — PROGRESS NOTE ADULT - PROBLEM SELECTOR PROBLEM 2
Pleural effusion, bilateral

## 2019-07-21 NOTE — PROGRESS NOTE ADULT - PROBLEM SELECTOR PROBLEM 1
CHF (congestive heart failure)
CHF (congestive heart failure)
SOB (shortness of breath)
SOB (shortness of breath)
CHF (congestive heart failure)
CHF (congestive heart failure)

## 2019-07-22 ENCOUNTER — TRANSCRIPTION ENCOUNTER (OUTPATIENT)
Age: 84
End: 2019-07-22

## 2019-07-22 VITALS
WEIGHT: 57.54 LBS | OXYGEN SATURATION: 95 % | DIASTOLIC BLOOD PRESSURE: 52 MMHG | HEART RATE: 109 BPM | SYSTOLIC BLOOD PRESSURE: 103 MMHG | TEMPERATURE: 98 F | RESPIRATION RATE: 17 BRPM

## 2019-07-22 LAB
ANION GAP SERPL CALC-SCNC: 5 MMOL/L — SIGNIFICANT CHANGE UP (ref 5–17)
BUN SERPL-MCNC: 25 MG/DL — HIGH (ref 7–23)
CALCIUM SERPL-MCNC: 8.8 MG/DL — SIGNIFICANT CHANGE UP (ref 8.5–10.1)
CHLORIDE SERPL-SCNC: 101 MMOL/L — SIGNIFICANT CHANGE UP (ref 96–108)
CO2 SERPL-SCNC: 35 MMOL/L — HIGH (ref 22–31)
CREAT SERPL-MCNC: 1.12 MG/DL — SIGNIFICANT CHANGE UP (ref 0.5–1.3)
GLUCOSE SERPL-MCNC: 83 MG/DL — SIGNIFICANT CHANGE UP (ref 70–99)
HCT VFR BLD CALC: 32.1 % — LOW (ref 34.5–45)
HGB BLD-MCNC: 9.4 G/DL — LOW (ref 11.5–15.5)
MCHC RBC-ENTMCNC: 22.1 PG — LOW (ref 27–34)
MCHC RBC-ENTMCNC: 29.3 GM/DL — LOW (ref 32–36)
MCV RBC AUTO: 75.4 FL — LOW (ref 80–100)
NON-GYNECOLOGICAL CYTOLOGY STUDY: SIGNIFICANT CHANGE UP
PLATELET # BLD AUTO: 279 K/UL — SIGNIFICANT CHANGE UP (ref 150–400)
POTASSIUM SERPL-MCNC: 3.8 MMOL/L — SIGNIFICANT CHANGE UP (ref 3.5–5.3)
POTASSIUM SERPL-SCNC: 3.8 MMOL/L — SIGNIFICANT CHANGE UP (ref 3.5–5.3)
RBC # BLD: 4.26 M/UL — SIGNIFICANT CHANGE UP (ref 3.8–5.2)
RBC # FLD: 19.9 % — HIGH (ref 10.3–14.5)
SODIUM SERPL-SCNC: 141 MMOL/L — SIGNIFICANT CHANGE UP (ref 135–145)
WBC # BLD: 7.15 K/UL — SIGNIFICANT CHANGE UP (ref 3.8–10.5)
WBC # FLD AUTO: 7.15 K/UL — SIGNIFICANT CHANGE UP (ref 3.8–10.5)

## 2019-07-22 RX ORDER — METOPROLOL TARTRATE 50 MG
1 TABLET ORAL
Qty: 0 | Refills: 0 | DISCHARGE
Start: 2019-07-22 | End: 2019-08-20

## 2019-07-22 RX ORDER — FUROSEMIDE 40 MG
40 TABLET ORAL DAILY
Refills: 0 | Status: DISCONTINUED | OUTPATIENT
Start: 2019-07-23 | End: 2019-07-22

## 2019-07-22 RX ORDER — PANTOPRAZOLE SODIUM 20 MG/1
1 TABLET, DELAYED RELEASE ORAL
Qty: 30 | Refills: 0
Start: 2019-07-22 | End: 2019-08-20

## 2019-07-22 RX ORDER — FUROSEMIDE 40 MG
1 TABLET ORAL
Qty: 30 | Refills: 0
Start: 2019-07-22 | End: 2019-08-20

## 2019-07-22 RX ORDER — TRAZODONE HCL 50 MG
50 TABLET ORAL
Qty: 0 | Refills: 0 | DISCHARGE

## 2019-07-22 RX ORDER — METOPROLOL TARTRATE 50 MG
1 TABLET ORAL
Qty: 60 | Refills: 0
Start: 2019-07-22 | End: 2019-08-20

## 2019-07-22 RX ADMIN — Medication 1 APPLICATION(S): at 06:38

## 2019-07-22 RX ADMIN — Medication 1 APPLICATION(S): at 06:37

## 2019-07-22 RX ADMIN — MONTELUKAST 10 MILLIGRAM(S): 4 TABLET, CHEWABLE ORAL at 14:06

## 2019-07-22 RX ADMIN — Medication 500 MICROGRAM(S): at 15:04

## 2019-07-22 RX ADMIN — Medication 500 MICROGRAM(S): at 08:37

## 2019-07-22 RX ADMIN — Medication 1 TABLET(S): at 14:06

## 2019-07-22 RX ADMIN — PREGABALIN 1000 MICROGRAM(S): 225 CAPSULE ORAL at 14:07

## 2019-07-22 NOTE — DISCHARGE NOTE PROVIDER - CARE PROVIDER_API CALL
Breezy Jurado)  Internal Medicine  [address], [address] [address]  Phone: [phone]  Fax: [phone]  Follow Up Time:

## 2019-07-22 NOTE — DISCHARGE NOTE PROVIDER - HOSPITAL COURSE
89 y/o F with PMH of asthma and PPM presents with c/o progressively worsening shortness of breath for past 2 days. History provided by 2 daughters via  phone service. Pt reportedly has severe asthma and was outside on Saturday for a birthday party. Pt does not do well in the heat and pt began complaining of worsening shortness of breath. Pt reportedly has had swelling of bilateral lower extremities, face and left breast. Pt has been seeing a dermatologist and allergist for many years for pruritic rash. Pt last saw her cardiologist couple months ago and states everything was normal. Pt reportedly had PPM placed over 20 years ago but daughter states it was never turned on as she didn't need one. Denies history of arrhythmias, AFIB, CHF. Daughter states that pt has no cardiac problems.             INTERVAL HPI/ OVERNIGHT EVENTS: Pt was seen and examined, family at bedside. Pt hs/p thoracentesis today, 900ml removed. Pt tolerated procedure well, had sedation. As per family Pt more awake now, looks comfortable. Pt is frequently calling out, but does not offer any complains.  POC discussed, will await for results and discuss further management with Pulm. Has opacity on CXR after procedure. Not febrile    07/20/19: Patient seen and examined. No new complaints.    07/21/19: patient seen and examined. Lying in bed without any distress.         Vital Signs Last 24 Hrs    T(C): 36.4 (22 Jul 2019 05:05), Max: 36.8 (21 Jul 2019 16:56)    T(F): 97.5 (22 Jul 2019 05:05), Max: 98.2 (21 Jul 2019 16:56)    HR: 58 (22 Jul 2019 08:38) (58 - 106)    BP: 106/35 (22 Jul 2019 05:05) (106/35 - 160/88)    BP(mean): --    RR: 17 (22 Jul 2019 05:05) (17 - 19)    SpO2: 91% (22 Jul 2019 08:38) (91% - 95%)            PHYSICAL EXAM:    General: Well developed; malnourished, frail elderly female,  in no acute distress    Eyes: PERRLA, EOMI; conjunctiva and sclera clear    Head: Normocephalic; atraumatic    ENMT: No nasal discharge; airway clear    Neck: Supple; no JVD     Respiratory: Slightly improved BS on L.  No wheezes, rales or rhonchi    Cardiovascular: Regular rate and rhythm. S1 and S2 Normal; No murmurs    Gastrointestinal: Soft non-tender non-distended; Normal bowel sounds    Genitourinary: No suprapubic  tenderness    Extremities:  edema improved    Neurological: Alert and oriented x 1-2, non focal, confused     Skin: Warm and dry.  + rash LE/UE with scratch marks and excoriations, dry, no discharge                         Assessment and Plan:     · Assessment        89 y/o F with PMH of asthma and PPM  admitted for;         # Acute hypoxic  respiratory failure 2/2 Acute on Chronic Diastolic CHF exacerbation and Anemia:      Change lasix to po        #Large left pleural effusion:      Suspect related to CHF;     S/p IR thoracentesis, 910ml of fluid removed        # Left pneumonia-HCAP    Suspected gram negative rods    On IV cefepime, change     to po augmentin         #  Iron Def Anemia:      Suspect chronic blood loss-- likely related to GI losses.      No signs of acute bleeding     S/p 1u PRBCs 7/16    IV iron nightly.       Monitor H/H, stable     Hemolysis work up negative.      FOBT neg    GI eval appreciated, family refused EGD         # Sinus Tachycardia. H/o AFIB     No events on PPM.      Cont metoprolol-- dose increased          #Asthma:  stable.      Cont singular        # Hypokalemia-replaced        #Rash:      Unclear etiology.  F/u derm outpatient.  Cont atarax.          #Weakness/ Trouble ambulating:      As per family, trouble walking last few weeks.  Worried she was going to fall.      Pt did have fall few months ago.      PT eval appreciated        Home today    Spent more than 30 min to prepare the discharge.

## 2019-07-22 NOTE — DISCHARGE NOTE NURSING/CASE MANAGEMENT/SOCIAL WORK - NSDCDPATPORTLINK_GEN_ALL_CORE
You can access the [facility] Patient Portal, offered by [facility], by registering with the following website: [email or web address]

## 2019-07-22 NOTE — DISCHARGE NOTE PROVIDER - NSDCCPCAREPLAN_GEN_ALL_CORE_FT
PRINCIPAL DISCHARGE DIAGNOSIS  Diagnosis: CHF (congestive heart failure)  Assessment and Plan of Treatment: Continue lasix. Follow up with cardology      SECONDARY DISCHARGE DIAGNOSES  Diagnosis: Pleural effusion  Assessment and Plan of Treatment:

## 2019-07-24 LAB
CULTURE RESULTS: SIGNIFICANT CHANGE UP
SPECIMEN SOURCE: SIGNIFICANT CHANGE UP

## 2019-07-26 DIAGNOSIS — Z79.82 LONG TERM (CURRENT) USE OF ASPIRIN: ICD-10-CM

## 2019-07-26 DIAGNOSIS — Z95.0 PRESENCE OF CARDIAC PACEMAKER: ICD-10-CM

## 2019-07-26 DIAGNOSIS — I25.10 ATHEROSCLEROTIC HEART DISEASE OF NATIVE CORONARY ARTERY WITHOUT ANGINA PECTORIS: ICD-10-CM

## 2019-07-26 DIAGNOSIS — I11.0 HYPERTENSIVE HEART DISEASE WITH HEART FAILURE: ICD-10-CM

## 2019-07-26 DIAGNOSIS — Z88.6 ALLERGY STATUS TO ANALGESIC AGENT: ICD-10-CM

## 2019-07-26 DIAGNOSIS — R00.0 TACHYCARDIA, UNSPECIFIED: ICD-10-CM

## 2019-07-26 DIAGNOSIS — I50.33 ACUTE ON CHRONIC DIASTOLIC (CONGESTIVE) HEART FAILURE: ICD-10-CM

## 2019-07-26 DIAGNOSIS — J15.6 PNEUMONIA DUE TO OTHER GRAM-NEGATIVE BACTERIA: ICD-10-CM

## 2019-07-26 DIAGNOSIS — E87.6 HYPOKALEMIA: ICD-10-CM

## 2019-07-26 DIAGNOSIS — I48.91 UNSPECIFIED ATRIAL FIBRILLATION: ICD-10-CM

## 2019-07-26 DIAGNOSIS — J90 PLEURAL EFFUSION, NOT ELSEWHERE CLASSIFIED: ICD-10-CM

## 2019-07-26 DIAGNOSIS — J45.909 UNSPECIFIED ASTHMA, UNCOMPLICATED: ICD-10-CM

## 2019-07-26 DIAGNOSIS — R21 RASH AND OTHER NONSPECIFIC SKIN ERUPTION: ICD-10-CM

## 2019-07-26 DIAGNOSIS — D50.0 IRON DEFICIENCY ANEMIA SECONDARY TO BLOOD LOSS (CHRONIC): ICD-10-CM

## 2019-07-26 DIAGNOSIS — E43 UNSPECIFIED SEVERE PROTEIN-CALORIE MALNUTRITION: ICD-10-CM

## 2019-07-26 DIAGNOSIS — J96.01 ACUTE RESPIRATORY FAILURE WITH HYPOXIA: ICD-10-CM

## 2019-07-26 DIAGNOSIS — R06.02 SHORTNESS OF BREATH: ICD-10-CM

## 2019-08-05 ENCOUNTER — APPOINTMENT (OUTPATIENT)
Dept: CARDIOLOGY | Facility: CLINIC | Age: 84
End: 2019-08-05
Payer: MEDICARE

## 2019-08-05 VITALS
HEART RATE: 81 BPM | HEIGHT: 59 IN | WEIGHT: 85 LBS | DIASTOLIC BLOOD PRESSURE: 71 MMHG | BODY MASS INDEX: 17.14 KG/M2 | TEMPERATURE: 97.7 F | OXYGEN SATURATION: 98 % | RESPIRATION RATE: 12 BRPM | SYSTOLIC BLOOD PRESSURE: 143 MMHG

## 2019-08-05 DIAGNOSIS — Z00.00 ENCOUNTER FOR GENERAL ADULT MEDICAL EXAMINATION W/OUT ABNORMAL FINDINGS: ICD-10-CM

## 2019-08-05 PROCEDURE — 93000 ELECTROCARDIOGRAM COMPLETE: CPT

## 2019-08-05 PROCEDURE — 99214 OFFICE O/P EST MOD 30 MIN: CPT

## 2019-08-05 RX ORDER — HYDROXYZINE HYDROCHLORIDE 10 MG/1
10 TABLET ORAL
Qty: 60 | Refills: 0 | Status: ACTIVE | COMMUNITY

## 2019-08-05 NOTE — PHYSICAL EXAM
[General Appearance - Well Developed] : well developed [Normal Appearance] : normal appearance [General Appearance - Well Nourished] : well nourished [Well Groomed] : well groomed [No Deformities] : no deformities [General Appearance - In No Acute Distress] : no acute distress [Eyelids - No Xanthelasma] : the eyelids demonstrated no xanthelasmas [Normal Conjunctiva] : the conjunctiva exhibited no abnormalities [Normal Oral Mucosa] : normal oral mucosa [No Oral Pallor] : no oral pallor [No Oral Cyanosis] : no oral cyanosis [Normal Jugular Venous V Waves Present] : normal jugular venous V waves present [Normal Jugular Venous A Waves Present] : normal jugular venous A waves present [No Jugular Venous Bailey A Waves] : no jugular venous bailey A waves [Heart Rate And Rhythm] : heart rate and rhythm were normal [Heart Sounds] : normal S1 and S2 [Murmurs] : no murmurs present [Exaggerated Use Of Accessory Muscles For Inspiration] : no accessory muscle use [Respiration, Rhythm And Depth] : normal respiratory rhythm and effort [Auscultation Breath Sounds / Voice Sounds] : lungs were clear to auscultation bilaterally [Abdomen Soft] : soft [Abdomen Tenderness] : non-tender [Abdomen Mass (___ Cm)] : no abdominal mass palpated [Abnormal Walk] : normal gait [Nail Clubbing] : no clubbing of the fingernails [Gait - Sufficient For Exercise Testing] : the gait was sufficient for exercise testing [Cyanosis, Localized] : no localized cyanosis [Petechial Hemorrhages (___cm)] : no petechial hemorrhages [Skin Color & Pigmentation] : normal skin color and pigmentation [] : no rash [No Venous Stasis] : no venous stasis [Skin Lesions] : no skin lesions [No Skin Ulcers] : no skin ulcer [No Xanthoma] : no  xanthoma was observed [Oriented To Time, Place, And Person] : oriented to person, place, and time [Affect] : the affect was normal [Mood] : the mood was normal [No Anxiety] : not feeling anxious

## 2019-08-05 NOTE — DISCUSSION/SUMMARY
[Congestive Heart Failure] : congestive heart failure [Stable] : stable [FreeTextEntry1] : Pt will continue Lasix 40 mg qod. Pt will assess weights and fluid balance. Pt will be referred to a Pulmonolgist.  [Patient] : the patient

## 2019-08-05 NOTE — HISTORY OF PRESENT ILLNESS
[FreeTextEntry1] : 89 yo wf presents after recent hospitalization at  for CHF. Thoracentesis was performed. Pt was later diagnosised with pneumonia. Pt feels improved and edema has been reduced. She has a PPM. Pt denies chest pain or shortness of breath.\par

## 2019-08-06 ENCOUNTER — NON-APPOINTMENT (OUTPATIENT)
Age: 84
End: 2019-08-06

## 2019-08-17 LAB
CULTURE RESULTS: SIGNIFICANT CHANGE UP
SPECIMEN SOURCE: SIGNIFICANT CHANGE UP

## 2019-08-19 ENCOUNTER — MEDICATION RENEWAL (OUTPATIENT)
Age: 84
End: 2019-08-19

## 2019-08-20 ENCOUNTER — MEDICATION RENEWAL (OUTPATIENT)
Age: 84
End: 2019-08-20

## 2019-08-26 ENCOUNTER — MEDICATION RENEWAL (OUTPATIENT)
Age: 84
End: 2019-08-26

## 2019-08-26 DIAGNOSIS — Z87.01 PERSONAL HISTORY OF PNEUMONIA (RECURRENT): ICD-10-CM

## 2019-08-26 RX ORDER — MONTELUKAST 10 MG/1
10 TABLET, FILM COATED ORAL DAILY
Refills: 0 | Status: ACTIVE | COMMUNITY

## 2019-08-29 ENCOUNTER — APPOINTMENT (OUTPATIENT)
Dept: CARDIOLOGY | Facility: CLINIC | Age: 84
End: 2019-08-29
Payer: MEDICARE

## 2019-08-29 ENCOUNTER — APPOINTMENT (OUTPATIENT)
Dept: CARDIOLOGY | Facility: CLINIC | Age: 84
End: 2019-08-29

## 2019-08-29 VITALS
BODY MASS INDEX: 15.32 KG/M2 | DIASTOLIC BLOOD PRESSURE: 74 MMHG | SYSTOLIC BLOOD PRESSURE: 146 MMHG | OXYGEN SATURATION: 96 % | HEIGHT: 59 IN | WEIGHT: 76 LBS | HEART RATE: 87 BPM

## 2019-08-29 PROCEDURE — 99214 OFFICE O/P EST MOD 30 MIN: CPT

## 2019-08-29 NOTE — HISTORY OF PRESENT ILLNESS
[FreeTextEntry1] : 89 yo female PMH: recent hospitalization at  for CHF. Thoracentesis was performed. Pt was later diagnosed with pneumonia. Pt feels improved However, presented today with increased LE edema no CP/SOB/PND/Orthopnea/Weight gain. States However,  she ran out of her lasix last week did not take any for 4 days which she just recently resumed it.   She has a PPM. \par

## 2019-08-29 NOTE — PHYSICAL EXAM
[General Appearance - Well Developed] : well developed [Normal Appearance] : normal appearance [General Appearance - Well Nourished] : well nourished [Well Groomed] : well groomed [No Deformities] : no deformities [General Appearance - In No Acute Distress] : no acute distress [Normal Conjunctiva] : the conjunctiva exhibited no abnormalities [] : no respiratory distress [Respiration, Rhythm And Depth] : normal respiratory rhythm and effort [Exaggerated Use Of Accessory Muscles For Inspiration] : no accessory muscle use [Auscultation Breath Sounds / Voice Sounds] : lungs were clear to auscultation bilaterally [Heart Rate And Rhythm] : heart rate and rhythm were normal [Heart Sounds] : normal S1 and S2 [Abdomen Soft] : soft [Skin Color & Pigmentation] : normal skin color and pigmentation [Oriented To Time, Place, And Person] : oriented to person, place, and time [FreeTextEntry1] : +2 B/L LE Edema

## 2019-08-29 NOTE — REASON FOR VISIT
[Initial Evaluation] : an initial evaluation of [Heart Failure] : congestive heart failure [Medication Management] : Medication management

## 2019-08-29 NOTE — DISCUSSION/SUMMARY
[FreeTextEntry1] : LE Edema/ CHF: Noted after missing 4 doses.  She has since resumed her daily lasix 40 MG  3 days ago. Will increase lasix to 1 1/2 tabs today only with K supplement, maintain daily weights low sodium diet, elevation of extremities, support stockings Pulmonary F/U ( Pleural effusions/Pneumonia ) \par \par Labs today\par OV one week

## 2019-08-30 ENCOUNTER — APPOINTMENT (OUTPATIENT)
Dept: CARDIOLOGY | Facility: CLINIC | Age: 84
End: 2019-08-30

## 2019-09-07 LAB
CULTURE RESULTS: SIGNIFICANT CHANGE UP
SPECIMEN SOURCE: SIGNIFICANT CHANGE UP

## 2019-09-11 ENCOUNTER — APPOINTMENT (OUTPATIENT)
Dept: INTERNAL MEDICINE | Facility: CLINIC | Age: 84
End: 2019-09-11
Payer: MEDICARE

## 2019-09-11 VITALS
OXYGEN SATURATION: 97 % | SYSTOLIC BLOOD PRESSURE: 110 MMHG | RESPIRATION RATE: 16 BRPM | TEMPERATURE: 99.3 F | BODY MASS INDEX: 16.12 KG/M2 | HEART RATE: 99 BPM | DIASTOLIC BLOOD PRESSURE: 54 MMHG | WEIGHT: 79.98 LBS | HEIGHT: 59 IN

## 2019-09-11 DIAGNOSIS — J18.9 PNEUMONIA, UNSPECIFIED ORGANISM: ICD-10-CM

## 2019-09-11 DIAGNOSIS — L29.8 OTHER PRURITUS: ICD-10-CM

## 2019-09-11 PROCEDURE — 99204 OFFICE O/P NEW MOD 45 MIN: CPT

## 2019-09-11 NOTE — HISTORY OF PRESENT ILLNESS
[FreeTextEntry1] : chronic itchy rash [de-identified] : History was obtained via the patient's daughter today. This is the first visit here for this 89-year-old female with a history of congestive heart failure and pneumonia hospitalization in July of this year during which time she had a thoracentesis. She also has a pacemaker. For asthma, she is maintained on montelukast.\par \par She is a chronic pruritic rash and is followed by Dr Guadarrama, dermatologist.

## 2019-09-11 NOTE — PHYSICAL EXAM
[No Acute Distress] : no acute distress [Well Nourished] : well nourished [Well Developed] : well developed [Normal Sclera/Conjunctiva] : normal sclera/conjunctiva [PERRL] : pupils equal round and reactive to light [Normal Oropharynx] : the oropharynx was normal [Normal Outer Ear/Nose] : the outer ears and nose were normal in appearance [No JVD] : no jugular venous distention [No Lymphadenopathy] : no lymphadenopathy [Supple] : supple [No Respiratory Distress] : no respiratory distress  [No Accessory Muscle Use] : no accessory muscle use [Clear to Auscultation] : lungs were clear to auscultation bilaterally [Normal Rate] : normal rate  [Regular Rhythm] : with a regular rhythm [Normal S1, S2] : normal S1 and S2 [No Extremity Clubbing/Cyanosis] : no extremity clubbing/cyanosis [Soft] : abdomen soft [Non-distended] : non-distended [Non Tender] : non-tender [No HSM] : no HSM [Normal Bowel Sounds] : normal bowel sounds [Normal Anterior Cervical Nodes] : no anterior cervical lymphadenopathy [No Focal Deficits] : no focal deficits [de-identified] : In WC [de-identified] : Mild LE swelling bilat. [de-identified] : HR 86 [de-identified] : chronic papular type skin rash present.

## 2019-09-11 NOTE — ASSESSMENT
[FreeTextEntry1] : #1 history of hospitalization for congestive heart failure with bilateral pleural effusions, and pneumonia in July 2019. Patient will continue furosemide 40 mg p.o. q.d.  Is also on metoprolol 25 mg p.o. b.i.d. Continue following with cardiologist Dr. Chirinos. Pleural fluid results included negative cytology, negative culture, negative fungal culture, negative AFB culture.\par \par #2 asthma. Continue montelukast.\par \par #3 chronic pruritic skin rash. Continue topical mometasone one to 2 times daily to affected area.  Hydroxyzine.  Continue following with dermatologist Dr. Guadarrama.\par \par RV 4 months.\par

## 2019-09-11 NOTE — REVIEW OF SYSTEMS
[Skin Rash] : skin rash [FreeTextEntry5] : see above [FreeTextEntry6] : see above [de-identified] : see above

## 2019-09-13 ENCOUNTER — APPOINTMENT (OUTPATIENT)
Dept: CARDIOLOGY | Facility: CLINIC | Age: 84
End: 2019-09-13

## 2019-09-27 ENCOUNTER — RX RENEWAL (OUTPATIENT)
Age: 84
End: 2019-09-27

## 2019-09-27 ENCOUNTER — MEDICATION RENEWAL (OUTPATIENT)
Age: 84
End: 2019-09-27

## 2019-10-21 DIAGNOSIS — R26.81 UNSTEADINESS ON FEET: ICD-10-CM

## 2019-11-11 ENCOUNTER — MEDICATION RENEWAL (OUTPATIENT)
Age: 84
End: 2019-11-11

## 2019-12-20 ENCOUNTER — APPOINTMENT (OUTPATIENT)
Dept: CARDIOLOGY | Facility: CLINIC | Age: 84
End: 2019-12-20

## 2020-01-08 NOTE — DISCHARGE NOTE PROVIDER - NSDCADMDATE_GEN_ALL_CORE_FT
-Pt underwent dental extractions day of presentation. Family reports cough for few days prior to admission.  -Overnight on 12/31, pt became tachypnic with fever to 100.3 and rapid response was called. CXR with left sided consolidation. -Started on vanc/zosyn overnight. ABG wnl. WBC wnl. Procal mildly elevated to .99. O2 saturations %. Suspect possible aspiration pneumonia vs CAP.  Respiratory panel negative    Continue zosyn   Sputum cxs revealed Enterobacter Cloacae      15-Jul-2019 16:02

## 2020-03-06 DIAGNOSIS — R53.1 WEAKNESS: ICD-10-CM

## 2020-04-03 ENCOUNTER — APPOINTMENT (OUTPATIENT)
Dept: CARDIOLOGY | Facility: CLINIC | Age: 85
End: 2020-04-03

## 2020-04-28 RX ORDER — METOPROLOL TARTRATE 25 MG/1
25 TABLET, FILM COATED ORAL TWICE DAILY
Qty: 180 | Refills: 2 | Status: ACTIVE | COMMUNITY
Start: 1900-01-01 | End: 1900-01-01

## 2020-05-04 ENCOUNTER — APPOINTMENT (OUTPATIENT)
Dept: CARDIOLOGY | Facility: CLINIC | Age: 85
End: 2020-05-04
Payer: MEDICARE

## 2020-05-04 VITALS
DIASTOLIC BLOOD PRESSURE: 69 MMHG | HEIGHT: 59 IN | HEART RATE: 88 BPM | BODY MASS INDEX: 17.14 KG/M2 | SYSTOLIC BLOOD PRESSURE: 149 MMHG | WEIGHT: 85 LBS

## 2020-05-04 PROCEDURE — 99214 OFFICE O/P EST MOD 30 MIN: CPT | Mod: 95

## 2020-05-04 RX ORDER — FUROSEMIDE 40 MG/1
40 TABLET ORAL DAILY
Qty: 90 | Refills: 0 | Status: DISCONTINUED | COMMUNITY
End: 2020-05-04

## 2020-05-06 NOTE — DISCUSSION/SUMMARY
[Congestive Heart Failure] : congestive heart failure [FreeTextEntry4] : pleural effusion [FreeTextEntry1] : Home services will be directed to the patient. Pt will follow up with Dr. Dodson for Pulmonary issues. Labs were declined at this time due to COVID risk.

## 2020-05-06 NOTE — HISTORY OF PRESENT ILLNESS
[Home] : at home, [unfilled] , at the time of the visit. [Medical Office: ([facility])___] : at the medical office located in  [Family Member] : family member [Patient] : the patient [Self] : self [FreeTextEntry1] : Initiated at 12:54pm\par Pt is seen on telehealth. H/O pleural effusion, CHF and lower extremity edema. Pt is confused and only speaks Romanian. She is with her daughter in law, Antonio who translates. Pt is comfortable but confused. She is eating well and appears in no distress. Family is concerned regarding support services which they feel is necessary.  [FreeTextEntry2] : Marielena Salazar [FreeTextEntry4] : Poppy Ayala MA

## 2020-05-06 NOTE — PHYSICAL EXAM
[General Appearance - Well Developed] : well developed [General Appearance - Well Nourished] : well nourished [Normal Conjunctiva] : the conjunctiva exhibited no abnormalities [Normal Oral Mucosa] : normal oral mucosa [No Jugular Venous Baiely A Waves] : no jugular venous bailey A waves [] : no respiratory distress [Arterial Pulses Normal] : the arterial pulses were normal [Edema] : no peripheral edema present [Veins - Varicosity Changes] : no varicosital changes were noted in the lower extremities [Abdomen Tenderness] : non-tender [Nail Clubbing] : no clubbing of the fingernails [Cyanosis, Localized] : no localized cyanosis [Skin Color & Pigmentation] : normal skin color and pigmentation [Skin Turgor] : normal skin turgor [FreeTextEntry1] : confused

## 2020-05-08 ENCOUNTER — APPOINTMENT (OUTPATIENT)
Dept: INTERNAL MEDICINE | Facility: CLINIC | Age: 85
End: 2020-05-08
Payer: MEDICARE

## 2020-05-08 DIAGNOSIS — J90 PLEURAL EFFUSION, NOT ELSEWHERE CLASSIFIED: ICD-10-CM

## 2020-05-08 DIAGNOSIS — J45.909 UNSPECIFIED ASTHMA, UNCOMPLICATED: ICD-10-CM

## 2020-05-08 PROCEDURE — 99214 OFFICE O/P EST MOD 30 MIN: CPT | Mod: 95

## 2020-05-08 RX ORDER — ALBUTEROL SULFATE 2.5 MG/3ML
(2.5 MG/3ML) SOLUTION RESPIRATORY (INHALATION)
Refills: 0 | Status: ACTIVE | COMMUNITY
Start: 2020-05-08

## 2020-05-08 RX ORDER — ALBUTEROL SULFATE 90 UG/1
108 (90 BASE) AEROSOL, METERED RESPIRATORY (INHALATION)
Qty: 1 | Refills: 2 | Status: ACTIVE | COMMUNITY
Start: 2020-05-08

## 2020-05-08 NOTE — HISTORY OF PRESENT ILLNESS
[Home] : at home, [unfilled] , at the time of the visit. [Medical Office: ([facility])___] : at the medical office located in  [Other:____] : [unfilled] [Self] : self [FreeTextEntry3] : da-in-law [de-identified] : This is a 89-year-old female with a history of congestive heart failure, pleural effusions, and asthma. The patient is noted to have occasional mild wheezing recently and has been using her nebulized albuterol about one time per day. Her symptom resolves after this. Is not having shortness of breath. He has only an infrequent cough. She is not having fever.\par \par Her lower extremity edema is improved and she is not having orthopnea.\par \par She has not had known COVID positive person exposure, fever, aches, or sore throat. [FreeTextEntry1] : occasional wheezing

## 2020-05-08 NOTE — REVIEW OF SYSTEMS
[Shortness Of Breath] : no shortness of breath [Fever] : no fever [FreeTextEntry6] : see above [Negative] : Heme/Lymph

## 2020-05-08 NOTE — ASSESSMENT
[FreeTextEntry1] : #1 mild asthma. Continue montelukast 10 mg p.o. q.d.  Proair is preferred to Albuterol nebs which can aerisolize virus.  If to use albuterol neb, then to do in room with open window and have others say out of the room for 3 hours.  Will call if wheeze is not improving or resolving, in which case we'll consider Medrol Dosepak.\par \par #2 CHF. Continue current meds. Weigh patient frequently. On furosemide 40 mg p.o. q.d.  cardiology following.

## 2020-06-29 ENCOUNTER — RX RENEWAL (OUTPATIENT)
Age: 85
End: 2020-06-29

## 2020-06-29 RX ORDER — FUROSEMIDE 40 MG/1
40 TABLET ORAL DAILY
Qty: 90 | Refills: 3 | Status: ACTIVE | COMMUNITY
Start: 2020-03-04 | End: 1900-01-01

## 2020-08-28 ENCOUNTER — APPOINTMENT (OUTPATIENT)
Dept: INTERNAL MEDICINE | Facility: CLINIC | Age: 85
End: 2020-08-28
Payer: MEDICARE

## 2020-08-28 DIAGNOSIS — J45.909 UNSPECIFIED ASTHMA, UNCOMPLICATED: ICD-10-CM

## 2020-08-28 DIAGNOSIS — I50.9 HEART FAILURE, UNSPECIFIED: ICD-10-CM

## 2020-08-28 DIAGNOSIS — L98.499 NON-PRESSURE CHRONIC ULCER OF SKIN OF OTHER SITES WITH UNSPECIFIED SEVERITY: ICD-10-CM

## 2020-08-28 PROCEDURE — 99442: CPT

## 2020-08-28 NOTE — ASSESSMENT
[FreeTextEntry1] : #1 Skin ulcer, L thigh.  Patient will keep the area clean and dry with soap and water washes twice a day and cover with a sterile gauze.  She will take Keflex 250 mg p.o. 3 times daily for 7 days.  If this is not improving, patient will come in for a following appointment. \par \par #2 CHF.\par \par #3 asthma. \par \par Total time spent on telephone visit today was 18 minutes.

## 2020-08-28 NOTE — HISTORY OF PRESENT ILLNESS
[Home] : at home, [unfilled] , at the time of the visit. [Medical Office: ([facility])___] : at the medical office located in  [FreeTextEntry3] : daughter [FreeTextEntry1] : RV, telephonic. [de-identified] : This is a return visit, telephonic, for this 89-year-old female with a history of CHF, asthma.  Daughter tells me she has had a ulcer on the left thigh area.  She has a history of scratching herself and has seen dermatology in the past and used use creams including Aquaphor.  Recently her daughter has been applying  triple antibiotic ointment to the ulcer area for a few days.  States that it is getting better.  Describes this is being about a inch by inch and there is a small area of yellowish centrally.\par \par Patient is not having fever or chills.\par \par Not having any increase in dyspnea.

## 2020-09-25 ENCOUNTER — APPOINTMENT (OUTPATIENT)
Dept: INTERNAL MEDICINE | Facility: CLINIC | Age: 85
End: 2020-09-25
Payer: MEDICARE

## 2020-09-25 VITALS
RESPIRATION RATE: 18 BRPM | TEMPERATURE: 98.4 F | OXYGEN SATURATION: 96 % | WEIGHT: 85 LBS | HEIGHT: 59 IN | DIASTOLIC BLOOD PRESSURE: 54 MMHG | HEART RATE: 81 BPM | BODY MASS INDEX: 17.14 KG/M2 | SYSTOLIC BLOOD PRESSURE: 132 MMHG

## 2020-09-25 DIAGNOSIS — L08.9 OTHER INJURY OF UNSPECIFIED BODY REGION, INITIAL ENCOUNTER: ICD-10-CM

## 2020-09-25 DIAGNOSIS — L89.90 PRESSURE ULCER OF UNSPECIFIED SITE, UNSPECIFIED STAGE: ICD-10-CM

## 2020-09-25 DIAGNOSIS — L08.9 PRESSURE ULCER OF UNSPECIFIED SITE, UNSPECIFIED STAGE: ICD-10-CM

## 2020-09-25 DIAGNOSIS — T14.8XXA OTHER INJURY OF UNSPECIFIED BODY REGION, INITIAL ENCOUNTER: ICD-10-CM

## 2020-09-25 PROCEDURE — 99214 OFFICE O/P EST MOD 30 MIN: CPT

## 2020-09-25 RX ORDER — CEPHALEXIN 250 MG/1
250 TABLET ORAL
Qty: 21 | Refills: 0 | Status: COMPLETED | COMMUNITY
Start: 2020-08-28 | End: 2020-09-25

## 2020-09-25 NOTE — HISTORY OF PRESENT ILLNESS
[FreeTextEntry8] : Pt is a 89 yr. old female with a h/ of asthma, CHF, skin ulcer. She presents with her daughter, is wheelchair bound and calling out , confused at times.  Per  daughter, she stopped ambulating a few months ago and has been bed bound. She developed 2 decubitus , one on each hip. The left is 1 inch wide with a  black/ necrotic center.  The right hip decub is 1 inch  with green central  discharge. The peripheral border is erythematous.  \par She is afebrile . DEnies shortness of breath, cough, chest pain.

## 2020-09-25 NOTE — PHYSICAL EXAM
[Supple] : supple [No Respiratory Distress] : no respiratory distress  [No Accessory Muscle Use] : no accessory muscle use [Clear to Auscultation] : lungs were clear to auscultation bilaterally [Normal Rate] : normal rate  [Regular Rhythm] : with a regular rhythm [Normal S1, S2] : normal S1 and S2 [Normal Posterior Cervical Nodes] : no posterior cervical lymphadenopathy [Normal Anterior Cervical Nodes] : no anterior cervical lymphadenopathy [de-identified] : pt calling out , appears in pain  [de-identified] : pt oriented to person only

## 2020-09-25 NOTE — ASSESSMENT
[FreeTextEntry1] : Dr. Dodson in to see pt \par Pt to go to ER , needs surgical evaluation, \par Daughter refused ambulance transfer , wants ot take her herself \par report given  to triage PA, [facility], [place]

## 2020-09-25 NOTE — REVIEW OF SYSTEMS
[Negative] : Integumentary [Fever] : no fever [Chills] : no chills [Fatigue] : no fatigue [de-identified] : see HPI

## 2020-11-02 ENCOUNTER — NON-APPOINTMENT (OUTPATIENT)
Age: 85
End: 2020-11-02

## 2020-11-02 ENCOUNTER — INPATIENT (INPATIENT)
Facility: HOSPITAL | Age: 85
LOS: 8 days | Discharge: HOSPICE | DRG: 871 | End: 2020-11-11
Attending: INTERNAL MEDICINE | Admitting: FAMILY MEDICINE
Payer: MEDICARE

## 2020-11-02 VITALS
OXYGEN SATURATION: 96 % | DIASTOLIC BLOOD PRESSURE: 79 MMHG | RESPIRATION RATE: 24 BRPM | SYSTOLIC BLOOD PRESSURE: 149 MMHG | HEART RATE: 123 BPM

## 2020-11-02 DIAGNOSIS — L89.90 PRESSURE ULCER OF UNSPECIFIED SITE, UNSPECIFIED STAGE: ICD-10-CM

## 2020-11-02 DIAGNOSIS — Z95.0 PRESENCE OF CARDIAC PACEMAKER: Chronic | ICD-10-CM

## 2020-11-02 LAB
ADD ON TEST-SPECIMEN IN LAB: SIGNIFICANT CHANGE UP
ALBUMIN SERPL ELPH-MCNC: 2 G/DL — LOW (ref 3.3–5)
ALP SERPL-CCNC: 105 U/L — SIGNIFICANT CHANGE UP (ref 40–120)
ALT FLD-CCNC: 12 U/L — SIGNIFICANT CHANGE UP (ref 12–78)
ANION GAP SERPL CALC-SCNC: 10 MMOL/L — SIGNIFICANT CHANGE UP (ref 5–17)
APPEARANCE UR: ABNORMAL
AST SERPL-CCNC: 20 U/L — SIGNIFICANT CHANGE UP (ref 15–37)
BASOPHILS # BLD AUTO: 0.03 K/UL — SIGNIFICANT CHANGE UP (ref 0–0.2)
BASOPHILS NFR BLD AUTO: 0.2 % — SIGNIFICANT CHANGE UP (ref 0–2)
BILIRUB SERPL-MCNC: 0.7 MG/DL — SIGNIFICANT CHANGE UP (ref 0.2–1.2)
BILIRUB UR-MCNC: ABNORMAL
BUN SERPL-MCNC: 70 MG/DL — HIGH (ref 7–23)
CALCIUM SERPL-MCNC: 8.4 MG/DL — LOW (ref 8.5–10.1)
CHLORIDE SERPL-SCNC: 113 MMOL/L — HIGH (ref 96–108)
CO2 SERPL-SCNC: 28 MMOL/L — SIGNIFICANT CHANGE UP (ref 22–31)
COLOR SPEC: YELLOW — SIGNIFICANT CHANGE UP
CREAT SERPL-MCNC: 3.73 MG/DL — HIGH (ref 0.5–1.3)
DIFF PNL FLD: ABNORMAL
EOSINOPHIL # BLD AUTO: 0.02 K/UL — SIGNIFICANT CHANGE UP (ref 0–0.5)
EOSINOPHIL NFR BLD AUTO: 0.1 % — SIGNIFICANT CHANGE UP (ref 0–6)
GLUCOSE SERPL-MCNC: 114 MG/DL — HIGH (ref 70–99)
GLUCOSE UR QL: NEGATIVE MG/DL — SIGNIFICANT CHANGE UP
HCT VFR BLD CALC: 26.4 % — LOW (ref 34.5–45)
HGB BLD-MCNC: 7.4 G/DL — LOW (ref 11.5–15.5)
IMM GRANULOCYTES NFR BLD AUTO: 1 % — SIGNIFICANT CHANGE UP (ref 0–1.5)
KETONES UR-MCNC: NEGATIVE — SIGNIFICANT CHANGE UP
LACTATE SERPL-SCNC: 4.3 MMOL/L — CRITICAL HIGH (ref 0.7–2)
LEUKOCYTE ESTERASE UR-ACNC: NEGATIVE — SIGNIFICANT CHANGE UP
LYMPHOCYTES # BLD AUTO: 1.68 K/UL — SIGNIFICANT CHANGE UP (ref 1–3.3)
LYMPHOCYTES # BLD AUTO: 8.8 % — LOW (ref 13–44)
MAGNESIUM SERPL-MCNC: 2.8 MG/DL — HIGH (ref 1.6–2.6)
MCHC RBC-ENTMCNC: 22.2 PG — LOW (ref 27–34)
MCHC RBC-ENTMCNC: 28 GM/DL — LOW (ref 32–36)
MCV RBC AUTO: 79 FL — LOW (ref 80–100)
MONOCYTES # BLD AUTO: 0.81 K/UL — SIGNIFICANT CHANGE UP (ref 0–0.9)
MONOCYTES NFR BLD AUTO: 4.2 % — SIGNIFICANT CHANGE UP (ref 2–14)
NEUTROPHILS # BLD AUTO: 16.42 K/UL — HIGH (ref 1.8–7.4)
NEUTROPHILS NFR BLD AUTO: 85.7 % — HIGH (ref 43–77)
NITRITE UR-MCNC: NEGATIVE — SIGNIFICANT CHANGE UP
PH UR: 5 — SIGNIFICANT CHANGE UP (ref 5–8)
PHOSPHATE SERPL-MCNC: 4.7 MG/DL — HIGH (ref 2.5–4.5)
PLATELET # BLD AUTO: 225 K/UL — SIGNIFICANT CHANGE UP (ref 150–400)
POTASSIUM SERPL-MCNC: 4.2 MMOL/L — SIGNIFICANT CHANGE UP (ref 3.5–5.3)
POTASSIUM SERPL-SCNC: 4.2 MMOL/L — SIGNIFICANT CHANGE UP (ref 3.5–5.3)
PROT SERPL-MCNC: 7 GM/DL — SIGNIFICANT CHANGE UP (ref 6–8.3)
PROT UR-MCNC: 30 MG/DL
RBC # BLD: 2.97 M/UL — LOW (ref 3.8–5.2)
RBC # BLD: 3.34 M/UL — LOW (ref 3.8–5.2)
RBC # FLD: 18 % — HIGH (ref 10.3–14.5)
RETICS #: 60 K/UL — SIGNIFICANT CHANGE UP (ref 25–125)
RETICS/RBC NFR: 2 % — SIGNIFICANT CHANGE UP (ref 0.5–2.5)
SARS-COV-2 RNA SPEC QL NAA+PROBE: SIGNIFICANT CHANGE UP
SODIUM SERPL-SCNC: 151 MMOL/L — HIGH (ref 135–145)
SP GR SPEC: 1.01 — SIGNIFICANT CHANGE UP (ref 1.01–1.02)
UROBILINOGEN FLD QL: 1 MG/DL
WBC # BLD: 19.16 K/UL — HIGH (ref 3.8–10.5)
WBC # FLD AUTO: 19.16 K/UL — HIGH (ref 3.8–10.5)

## 2020-11-02 PROCEDURE — 93010 ELECTROCARDIOGRAM REPORT: CPT

## 2020-11-02 PROCEDURE — 76770 US EXAM ABDO BACK WALL COMP: CPT | Mod: 26

## 2020-11-02 PROCEDURE — 82272 OCCULT BLD FECES 1-3 TESTS: CPT

## 2020-11-02 PROCEDURE — 83550 IRON BINDING TEST: CPT

## 2020-11-02 PROCEDURE — 87040 BLOOD CULTURE FOR BACTERIA: CPT

## 2020-11-02 PROCEDURE — 36430 TRANSFUSION BLD/BLD COMPNT: CPT

## 2020-11-02 PROCEDURE — 93971 EXTREMITY STUDY: CPT | Mod: RT

## 2020-11-02 PROCEDURE — 85045 AUTOMATED RETICULOCYTE COUNT: CPT

## 2020-11-02 PROCEDURE — 92523 SPEECH SOUND LANG COMPREHEN: CPT | Mod: GN

## 2020-11-02 PROCEDURE — 92526 ORAL FUNCTION THERAPY: CPT | Mod: GN

## 2020-11-02 PROCEDURE — 86923 COMPATIBILITY TEST ELECTRIC: CPT

## 2020-11-02 PROCEDURE — 83540 ASSAY OF IRON: CPT

## 2020-11-02 PROCEDURE — 82746 ASSAY OF FOLIC ACID SERUM: CPT

## 2020-11-02 PROCEDURE — 82728 ASSAY OF FERRITIN: CPT

## 2020-11-02 PROCEDURE — 93971 EXTREMITY STUDY: CPT | Mod: 26,RT

## 2020-11-02 PROCEDURE — 92507 TX SP LANG VOICE COMM INDIV: CPT | Mod: GN

## 2020-11-02 PROCEDURE — 84134 ASSAY OF PREALBUMIN: CPT

## 2020-11-02 PROCEDURE — 74176 CT ABD & PELVIS W/O CONTRAST: CPT | Mod: 26

## 2020-11-02 PROCEDURE — 85027 COMPLETE CBC AUTOMATED: CPT

## 2020-11-02 PROCEDURE — 86769 SARS-COV-2 COVID-19 ANTIBODY: CPT

## 2020-11-02 PROCEDURE — 71045 X-RAY EXAM CHEST 1 VIEW: CPT | Mod: 26

## 2020-11-02 PROCEDURE — 36415 COLL VENOUS BLD VENIPUNCTURE: CPT

## 2020-11-02 PROCEDURE — P9016: CPT

## 2020-11-02 PROCEDURE — 92610 EVALUATE SWALLOWING FUNCTION: CPT | Mod: GN

## 2020-11-02 PROCEDURE — 76770 US EXAM ABDO BACK WALL COMP: CPT

## 2020-11-02 PROCEDURE — 83605 ASSAY OF LACTIC ACID: CPT

## 2020-11-02 PROCEDURE — 99223 1ST HOSP IP/OBS HIGH 75: CPT

## 2020-11-02 PROCEDURE — 82607 VITAMIN B-12: CPT

## 2020-11-02 PROCEDURE — 80048 BASIC METABOLIC PNL TOTAL CA: CPT

## 2020-11-02 RX ORDER — METOPROLOL TARTRATE 50 MG
25 TABLET ORAL DAILY
Refills: 0 | Status: DISCONTINUED | OUTPATIENT
Start: 2020-11-02 | End: 2020-11-04

## 2020-11-02 RX ORDER — SODIUM CHLORIDE 9 MG/ML
1000 INJECTION INTRAMUSCULAR; INTRAVENOUS; SUBCUTANEOUS ONCE
Refills: 0 | Status: COMPLETED | OUTPATIENT
Start: 2020-11-02 | End: 2020-11-02

## 2020-11-02 RX ORDER — SODIUM CHLORIDE 9 MG/ML
1000 INJECTION, SOLUTION INTRAVENOUS
Refills: 0 | Status: DISCONTINUED | OUTPATIENT
Start: 2020-11-02 | End: 2020-11-03

## 2020-11-02 RX ORDER — PIPERACILLIN AND TAZOBACTAM 4; .5 G/20ML; G/20ML
3.38 INJECTION, POWDER, LYOPHILIZED, FOR SOLUTION INTRAVENOUS EVERY 12 HOURS
Refills: 0 | Status: COMPLETED | OUTPATIENT
Start: 2020-11-02 | End: 2020-11-09

## 2020-11-02 RX ORDER — MONTELUKAST 4 MG/1
1 TABLET, CHEWABLE ORAL
Qty: 0 | Refills: 0 | DISCHARGE

## 2020-11-02 RX ORDER — HEPARIN SODIUM 5000 [USP'U]/ML
5000 INJECTION INTRAVENOUS; SUBCUTANEOUS EVERY 12 HOURS
Refills: 0 | Status: DISCONTINUED | OUTPATIENT
Start: 2020-11-02 | End: 2020-11-11

## 2020-11-02 RX ORDER — METOPROLOL TARTRATE 50 MG
25 TABLET ORAL DAILY
Refills: 0 | Status: DISCONTINUED | OUTPATIENT
Start: 2020-11-02 | End: 2020-11-02

## 2020-11-02 RX ORDER — VANCOMYCIN HCL 1 G
1000 VIAL (EA) INTRAVENOUS ONCE
Refills: 0 | Status: DISCONTINUED | OUTPATIENT
Start: 2020-11-02 | End: 2020-11-02

## 2020-11-02 RX ORDER — HYDROXYZINE HCL 10 MG
10 TABLET ORAL
Qty: 0 | Refills: 0 | DISCHARGE

## 2020-11-02 RX ORDER — VANCOMYCIN HCL 1 G
750 VIAL (EA) INTRAVENOUS ONCE
Refills: 0 | Status: COMPLETED | OUTPATIENT
Start: 2020-11-02 | End: 2020-11-02

## 2020-11-02 RX ORDER — PIPERACILLIN AND TAZOBACTAM 4; .5 G/20ML; G/20ML
3.38 INJECTION, POWDER, LYOPHILIZED, FOR SOLUTION INTRAVENOUS ONCE
Refills: 0 | Status: COMPLETED | OUTPATIENT
Start: 2020-11-02 | End: 2020-11-02

## 2020-11-02 RX ADMIN — SODIUM CHLORIDE 1000 MILLILITER(S): 9 INJECTION INTRAMUSCULAR; INTRAVENOUS; SUBCUTANEOUS at 18:49

## 2020-11-02 RX ADMIN — Medication 250 MILLIGRAM(S): at 17:36

## 2020-11-02 RX ADMIN — SODIUM CHLORIDE 1000 MILLILITER(S): 9 INJECTION INTRAMUSCULAR; INTRAVENOUS; SUBCUTANEOUS at 15:45

## 2020-11-02 RX ADMIN — SODIUM CHLORIDE 1000 MILLILITER(S): 9 INJECTION INTRAMUSCULAR; INTRAVENOUS; SUBCUTANEOUS at 19:51

## 2020-11-02 RX ADMIN — PIPERACILLIN AND TAZOBACTAM 3.38 GRAM(S): 4; .5 INJECTION, POWDER, LYOPHILIZED, FOR SOLUTION INTRAVENOUS at 17:10

## 2020-11-02 RX ADMIN — SODIUM CHLORIDE 80 MILLILITER(S): 9 INJECTION, SOLUTION INTRAVENOUS at 20:13

## 2020-11-02 RX ADMIN — HEPARIN SODIUM 5000 UNIT(S): 5000 INJECTION INTRAVENOUS; SUBCUTANEOUS at 21:49

## 2020-11-02 RX ADMIN — SODIUM CHLORIDE 1000 MILLILITER(S): 9 INJECTION INTRAMUSCULAR; INTRAVENOUS; SUBCUTANEOUS at 17:19

## 2020-11-02 RX ADMIN — PIPERACILLIN AND TAZOBACTAM 200 GRAM(S): 4; .5 INJECTION, POWDER, LYOPHILIZED, FOR SOLUTION INTRAVENOUS at 15:45

## 2020-11-02 RX ADMIN — Medication 750 MILLIGRAM(S): at 18:40

## 2020-11-02 NOTE — ED ADULT TRIAGE NOTE - CHIEF COMPLAINT QUOTE
Patient sent from MD office for decreased PO intake and multiple pressure ulcers.  Patient speaks Latvian only.

## 2020-11-02 NOTE — H&P ADULT - ASSESSMENT
89 y.o. female with PMHx of syncope s/p PPM, CHF with left pleural effusion in the past, asthma, Iron deficiency anemia p/w progressively worsening oral intake (previously would eat pureed food but last 1-2 weeks stopped and wouldn't drink either), became confused, would not talk or respond BIBA from home and found to be in FLORA, sepsis with BL infected hip decubitus st IV Left worse than right based on CT, Failure to thrive, anemia, RLE edema and metabolic encephalopathy.    1. Sepsis w/o septic shock due to infected hip decubs BL - s/p 2L IVF and IV abx in ED   - cont IVF with LR, strict i&Os with FQ, cont empiric IV abx, ID eval   - f/u UCx/BCx and obtain wound cx   - wound care team eval    2. FLORA likely due to ATN with sepsis and decreased po intake - strict I&O, FQ, renal US, IVF hydration    3. Anemia with Hx of KEHINDE - w/u ordered, transfuse 2 units PRBCs - daughter agreed over the phone    4. FTT due to advanced age with decreased po intake, underlying dementia and sepsis   - nutritionist eval    5. Metabolic encephalopathy due to all above on top of dementia    6. RLE edema - LE doppler to r/o DVT as pt is bedridden for long time    7. Functional quadriplegia due to advanced dementia    8. VTE proph - [facility] discussion: DNR/DNI documented by ED, but as per daughter wants to treat her and see if she would respond - explained that it won't change outcome 89 y.o. female with PMHx of syncope s/p PPM, CHF with left pleural effusion in the past, asthma, Iron deficiency anemia p/w progressively worsening oral intake (previously would eat pureed food but last 1-2 weeks stopped and wouldn't drink either), became confused, would not talk or respond BIBA from home and found to be in FLORA, sepsis with BL infected hip decubitus st IV Left worse than right based on CT, Failure to thrive, anemia, RLE edema and metabolic encephalopathy.    1. Sepsis w/o septic shock due to infected hip decubs BL - s/p 2L IVF and IV abx in ED   - cont IVF with LR, strict i&Os with FQ, cont empiric IV abx, ID eval   - f/u UCx/BCx and obtain wound cx   - wound care team eval    2. FLORA likely due to ATN with sepsis and decreased po intake - strict I&O, FQ, renal US, IVF hydration    3. Anemia with Hx of KEHINDE - w/u ordered, transfuse 2 units PRBCs - daughter agreed over the phone    4. FTT due to advanced age with decreased po intake, underlying dementia and sepsis   - nutritionist eval    5. Metabolic encephalopathy due to all above on top of dementia    6. 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 Functional quadriplegia due to advanced dementia    8. VTE proph - [facility]    GOC discussion: DNR/DNI documented by ED, but as per daughter wants to treat her and see if she would respond - explained that it won't change outcome

## 2020-11-02 NOTE — ED PROVIDER NOTE - PHYSICAL EXAMINATION
Michael COTTO MD PGY3:   PHYSICAL EXAM:    GENERAL: Thin  HEENT:  Atraumatic, Normocephalic  CHEST/LUNG: Chest rise equal bilaterally. CTAB.   HEART: Regular rate and rhythm. No murmurs or rubs.   ABDOMEN: Soft, Nontender, Nondistended  PSYCH: A&Ox0  SKIN: No obvious rashes or lesions  MSK: Bilateral Stage 4 (technically unstagable due to eschar, but hip joint visualized b/l) pressure ulcers without discharge or purulence.

## 2020-11-02 NOTE — ED ADULT NURSE REASSESSMENT NOTE - NS ED NURSE REASSESS COMMENT FT1
As per pt daughter Darlyn Barrera phone number [phone] pt code status changed to full code. MD Sage made aware. Pt requested call back from MD. MD Sage aware about pt elevated heart rate and pt US results.

## 2020-11-02 NOTE — H&P ADULT - NSHPPHYSICALEXAM_GEN_ALL_CORE
PHYSICAL EXAM:    General: frail emanciated contracted female in the bed on right side  Eyes: conjunctiva and sclera clear  Head: Normocephalic; atraumatic  ENMT: No nasal discharge; airway clear, dry mucosals  Neck: Supple; non tender; no masses  Respiratory: No wheezes, rales or rhonchi  Cardiovascular: S1, S2 reg, mildly tachycardic  Gastrointestinal: Soft abd, NT, + BS  Genitourinary: No costovertebral angle tenderness  Extremities: Contracted LE, dark red discolored LE with right ankle +2 edema  Vascular: Peripheral pulses palpable 2+ bilaterally  Neurological: Alert, confused, non-verbal  Skin: St IV BL hip malodorous decubs, multiple violaceous areas BL LE, + venous stasis dermatosis  Musculoskeletal: contracted

## 2020-11-02 NOTE — ED ADULT NURSE REASSESSMENT NOTE - NS ED NURSE REASSESS COMMENT FT1
IV placed. Labs drawn as ordered. No lactate required as per MD. IV fluids initiated. Wound care provided. Patient turned and positioned. EKG completed. Cardiac monitoring in place. Will continue to monitor.

## 2020-11-02 NOTE — ED PROVIDER NOTE - CLINICAL SUMMARY MEDICAL DECISION MAKING FREE TEXT BOX
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 Will obtain CXR, UA and CTAP to assess for PNA, UTI or infection/osteo involving hips and decub ulcers. Empriic zosyn given ulcers.

## 2020-11-02 NOTE — ED PROVIDER NOTE - PROGRESS NOTE DETAILS
Nirmala Victoria for attending Dr. Wilhelm: 89 y/o female with a PMHx of asthma, dementia presents to  ED BIBEMS from home for poor PO intake. Pt nonverbal, unable to give hx at this time. Michael COTTO MD PGY3: WBC count noted with FLORA compared to Cr from last year. Will give 1 L NS and reassess. Michael COTTO MD PGY3: Discussed code status with vinnie's daughter Eric whom makes patient's healthcare decisions together with her brother. Discussed concerns that if patient were to lose pulses, that CPR would likely not be effective in resuscitation given patient's age, comorbidities (poss osteo, sepsis, and FLORA). Daughter agrees, believes her mother would not want further suffering 2/2 extreme measures and said she would not want patient intubated either. MOLST form filled. + chronic DVT result noted.  AC deferred to admitting team given anemia and severe chronic disease and wounds.  MD Eric

## 2020-11-02 NOTE — ED PROVIDER NOTE - ATTENDING CONTRIBUTION TO CARE
I, Carmela Wilhelm MD,  performed the initial face to face bedside interview with this patient regarding history of present illness, review of symptoms and relevant past medical, social and family history.  I completed an independent physical examination.  I was the initial provider who evaluated this patient. I have signed out the follow up of any pending tests (i.e. labs, radiological studies) to the resident.  I have communicated the patient’s plan of care and disposition with the resident.

## 2020-11-02 NOTE — H&P ADULT - HISTORY OF PRESENT ILLNESS
89 y.o. female with PMHx of syncope s/p PPM, CHF with left pleural effusion in the past, asthma, Iron deficiency anemia p/w progressively worsening oral intake (previously would eat pureed food but last 1-2 weeks stopped and wouldn't drink either), became confused, would not talk or respond BIBA from home and found to be in FLORA, sepsis with BL infected hip decubitus st IV Left worse than right based on CT, Failure to thrive, anemia, RLE edema and metabolic encephalopathy.

## 2020-11-02 NOTE — ED ADULT NURSE REASSESSMENT NOTE - NS ED NURSE REASSESS COMMENT FT1
Cardiac Monitoring in place. IV fluids infusing as ordered. Patient turned and positioned. Pillow supports in place. Wound care was provided to bilateral unstageable hip ulcers. Cleansed with Normal Saleine, pat dry, liquid barrier to edges and Mepilex applied. Will continue to monitor.

## 2020-11-02 NOTE — CHART NOTE - NSCHARTNOTEFT_GEN_A_CORE
notified by rn that pt that was previously made DNR/DNI by admitting Hospitalist now a full code per family wishes. Pt to be considered a full code per HCP wishes

## 2020-11-02 NOTE — ED PROVIDER NOTE - OBJECTIVE STATEMENT
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 Pt nonverbal, unable to give hx at this time.    PMD Dr Zapata

## 2020-11-02 NOTE — ED PROVIDER NOTE - CARE PLAN
Principal Discharge DX:	Decubitus ulcers  Secondary Diagnosis:	Sepsis   Principal Discharge DX:	Decubitus ulcers  Secondary Diagnosis:	Sepsis  Secondary Diagnosis:	End of life care

## 2020-11-03 LAB
-  CANDIDA ALBICANS: SIGNIFICANT CHANGE UP
-  CANDIDA GLABRATA: SIGNIFICANT CHANGE UP
-  CANDIDA KRUSEI: SIGNIFICANT CHANGE UP
-  CANDIDA PARAPSILOSIS: SIGNIFICANT CHANGE UP
-  CANDIDA TROPICALIS: SIGNIFICANT CHANGE UP
-  COAGULASE NEGATIVE STAPHYLOCOCCUS: SIGNIFICANT CHANGE UP
-  K. PNEUMONIAE GROUP: SIGNIFICANT CHANGE UP
-  KPC RESISTANCE GENE: SIGNIFICANT CHANGE UP
-  STREPTOCOCCUS SP. (NOT GRP A, B OR S PNEUMONIAE): SIGNIFICANT CHANGE UP
A BAUMANNII DNA SPEC QL NAA+PROBE: SIGNIFICANT CHANGE UP
ANION GAP SERPL CALC-SCNC: 8 MMOL/L — SIGNIFICANT CHANGE UP (ref 5–17)
BUN SERPL-MCNC: 69 MG/DL — HIGH (ref 7–23)
CALCIUM SERPL-MCNC: 8.1 MG/DL — LOW (ref 8.5–10.1)
CHLORIDE SERPL-SCNC: 117 MMOL/L — HIGH (ref 96–108)
CO2 SERPL-SCNC: 29 MMOL/L — SIGNIFICANT CHANGE UP (ref 22–31)
CREAT SERPL-MCNC: 3.23 MG/DL — HIGH (ref 0.5–1.3)
CULTURE RESULTS: NO GROWTH — SIGNIFICANT CHANGE UP
E CLOAC COMP DNA BLD POS QL NAA+PROBE: SIGNIFICANT CHANGE UP
E COLI DNA BLD POS QL NAA+NON-PROBE: SIGNIFICANT CHANGE UP
ENTEROCOC DNA BLD POS QL NAA+NON-PROBE: SIGNIFICANT CHANGE UP
ENTEROCOC DNA BLD POS QL NAA+NON-PROBE: SIGNIFICANT CHANGE UP
FERRITIN SERPL-MCNC: 117 NG/ML — SIGNIFICANT CHANGE UP (ref 15–150)
FOLATE SERPL-MCNC: 12.1 NG/ML — SIGNIFICANT CHANGE UP
GLUCOSE SERPL-MCNC: 104 MG/DL — HIGH (ref 70–99)
GP B STREP DNA BLD POS QL NAA+NON-PROBE: SIGNIFICANT CHANGE UP
GRAM STN FLD: SIGNIFICANT CHANGE UP
HAEM INFLU DNA BLD POS QL NAA+NON-PROBE: SIGNIFICANT CHANGE UP
HCT VFR BLD CALC: 36 % — SIGNIFICANT CHANGE UP (ref 34.5–45)
HGB BLD-MCNC: 11 G/DL — LOW (ref 11.5–15.5)
IRON SATN MFR SERPL: 13 UG/DL — LOW (ref 30–160)
IRON SATN MFR SERPL: 7 % — LOW (ref 14–50)
K OXYTOCA DNA BLD POS QL NAA+NON-PROBE: SIGNIFICANT CHANGE UP
L MONOCYTOG DNA BLD POS QL NAA+NON-PROBE: SIGNIFICANT CHANGE UP
MCHC RBC-ENTMCNC: 24.4 PG — LOW (ref 27–34)
MCHC RBC-ENTMCNC: 30.6 GM/DL — LOW (ref 32–36)
MCV RBC AUTO: 80 FL — SIGNIFICANT CHANGE UP (ref 80–100)
METHOD TYPE: SIGNIFICANT CHANGE UP
MRSA SPEC QL CULT: SIGNIFICANT CHANGE UP
MSSA DNA SPEC QL NAA+PROBE: SIGNIFICANT CHANGE UP
N MEN ISLT CULT: SIGNIFICANT CHANGE UP
P AERUGINOSA DNA BLD POS NAA+NON-PROBE: SIGNIFICANT CHANGE UP
PLATELET # BLD AUTO: 166 K/UL — SIGNIFICANT CHANGE UP (ref 150–400)
POTASSIUM SERPL-MCNC: 3.9 MMOL/L — SIGNIFICANT CHANGE UP (ref 3.5–5.3)
POTASSIUM SERPL-SCNC: 3.9 MMOL/L — SIGNIFICANT CHANGE UP (ref 3.5–5.3)
PREALB SERPL-MCNC: 6 MG/DL — LOW (ref 20–40)
RBC # BLD: 4.5 M/UL — SIGNIFICANT CHANGE UP (ref 3.8–5.2)
RBC # FLD: 17 % — HIGH (ref 10.3–14.5)
S MARCESCENS DNA BLD POS NAA+NON-PROBE: SIGNIFICANT CHANGE UP
S PNEUM DNA BLD POS QL NAA+NON-PROBE: SIGNIFICANT CHANGE UP
S PYO DNA BLD POS QL NAA+NON-PROBE: SIGNIFICANT CHANGE UP
SARS-COV-2 IGG SERPL QL IA: NEGATIVE — SIGNIFICANT CHANGE UP
SARS-COV-2 IGM SERPL IA-ACNC: 0.09 INDEX — SIGNIFICANT CHANGE UP
SODIUM SERPL-SCNC: 154 MMOL/L — HIGH (ref 135–145)
SPECIMEN SOURCE: SIGNIFICANT CHANGE UP
SPECIMEN SOURCE: SIGNIFICANT CHANGE UP
TIBC SERPL-MCNC: 171 UG/DL — LOW (ref 220–430)
UIBC SERPL-MCNC: 159 UG/DL — SIGNIFICANT CHANGE UP (ref 110–370)
VIT B12 SERPL-MCNC: 949 PG/ML — SIGNIFICANT CHANGE UP (ref 232–1245)
WBC # BLD: 15.93 K/UL — HIGH (ref 3.8–10.5)
WBC # FLD AUTO: 15.93 K/UL — HIGH (ref 3.8–10.5)

## 2020-11-03 PROCEDURE — 99223 1ST HOSP IP/OBS HIGH 75: CPT

## 2020-11-03 PROCEDURE — 99221 1ST HOSP IP/OBS SF/LOW 40: CPT

## 2020-11-03 PROCEDURE — 99233 SBSQ HOSP IP/OBS HIGH 50: CPT

## 2020-11-03 RX ORDER — SODIUM CHLORIDE 9 MG/ML
1000 INJECTION, SOLUTION INTRAVENOUS
Refills: 0 | Status: DISCONTINUED | OUTPATIENT
Start: 2020-11-03 | End: 2020-11-04

## 2020-11-03 RX ORDER — ACETAMINOPHEN 500 MG
1000 TABLET ORAL ONCE
Refills: 0 | Status: DISCONTINUED | OUTPATIENT
Start: 2020-11-03 | End: 2020-11-11

## 2020-11-03 RX ORDER — HYDROMORPHONE HYDROCHLORIDE 2 MG/ML
0.2 INJECTION INTRAMUSCULAR; INTRAVENOUS; SUBCUTANEOUS EVERY 4 HOURS
Refills: 0 | Status: DISCONTINUED | OUTPATIENT
Start: 2020-11-03 | End: 2020-11-04

## 2020-11-03 RX ORDER — ACETAMINOPHEN 500 MG
1000 TABLET ORAL ONCE
Refills: 0 | Status: COMPLETED | OUTPATIENT
Start: 2020-11-03 | End: 2020-11-03

## 2020-11-03 RX ADMIN — SODIUM CHLORIDE 80 MILLILITER(S): 9 INJECTION, SOLUTION INTRAVENOUS at 10:34

## 2020-11-03 RX ADMIN — HYDROMORPHONE HYDROCHLORIDE 0.2 MILLIGRAM(S): 2 INJECTION INTRAMUSCULAR; INTRAVENOUS; SUBCUTANEOUS at 14:40

## 2020-11-03 RX ADMIN — PIPERACILLIN AND TAZOBACTAM 25 GRAM(S): 4; .5 INJECTION, POWDER, LYOPHILIZED, FOR SOLUTION INTRAVENOUS at 05:47

## 2020-11-03 RX ADMIN — SODIUM CHLORIDE 80 MILLILITER(S): 9 INJECTION, SOLUTION INTRAVENOUS at 22:33

## 2020-11-03 RX ADMIN — Medication 110 MILLIGRAM(S): at 21:11

## 2020-11-03 RX ADMIN — SODIUM CHLORIDE 80 MILLILITER(S): 9 INJECTION, SOLUTION INTRAVENOUS at 04:26

## 2020-11-03 RX ADMIN — PIPERACILLIN AND TAZOBACTAM 25 GRAM(S): 4; .5 INJECTION, POWDER, LYOPHILIZED, FOR SOLUTION INTRAVENOUS at 22:33

## 2020-11-03 RX ADMIN — Medication 400 MILLIGRAM(S): at 21:10

## 2020-11-03 RX ADMIN — HEPARIN SODIUM 5000 UNIT(S): 5000 INJECTION INTRAVENOUS; SUBCUTANEOUS at 09:16

## 2020-11-03 RX ADMIN — HEPARIN SODIUM 5000 UNIT(S): 5000 INJECTION INTRAVENOUS; SUBCUTANEOUS at 21:19

## 2020-11-03 NOTE — SWALLOW BEDSIDE ASSESSMENT ADULT - ORAL PREPARATORY PHASE
Pt's orientation to feeding was reduced. She was passive and inattentive when PO was offered. Her willingness was often psychogenically reduced and she tended to accept PO in small volumes during the limited times she did accept food. Pt's orientation to feeding was reduced. She was passive and inattentive when PO was offered. Her willingness to accept PO was often psychogenically reduced and she tended to accept PO in small volumes during the limited times she did accept food.

## 2020-11-03 NOTE — SWALLOW BEDSIDE ASSESSMENT ADULT - SLP GENERAL OBSERVATIONS
On encounter, pt is frail/cachectic in appearance. A loss of bulk was evident in strap muscle regions. Pt is awake. Affect is flat. Pt is communicatively passive & internally distractible. Joint attention not sustained for any prolonged periods of time. Unable to direct to structured communication tasks, not did she follow commands. Of note is that she did occasionally moan without clear intent but no decipherable verbalizations could be elicited. Prominent Cognitive Dysfunction is evident. Pt's needs must be anticipated. On encounter, pt is frail/cachectic in appearance. A loss of bulk was evident in strap muscle regions. Pt is awake. Affect is flat. Pt is communicatively passive & internally distractible. Joint attention not sustained for any prolonged periods of time. Unable to direct to structured communication tasks, nor did she follow commands. Of note is that she did occasionally spontaneously moan without clear intent but no decipherable verbalizations could be elicited. Prominent Cognitive Dysfunction is evident. Pt's needs must be anticipated.

## 2020-11-03 NOTE — ADVANCED PRACTICE NURSE CONSULT - RECOMMEDATIONS
1) Continue to turn and position every 2 hours  2) Continue to elevate heels off mattress  3) Low airloss mattress ordered for patient for pressure redistribution  4) Change dressings to right and left hips every other day with Medihoney Aquacel and foam dressing  5) Change dressing to right knee, right anterior ankle sacrum every other day with foam dressing  6) Apply skin prep to both lower extremity DTIs  - daily  7) Albumin- 2.0 on 11/2/2020.  Nutritionist following patient with the current recommendations:  1. advance diet as per SLP recommendations 2. add Ensure enlive TID (w/ appropriate thickness as per SLP recs) 3. clarify GOC regarding nutrition support 4. when able provide maximum assistance w/ po intake and maintain aspiration precautions 5. weekly wt 6. add MVI w/ minerals daily, 500 mg vitamin c BID, and 225 mg zinc BID x 10days to promote wound healing.

## 2020-11-03 NOTE — PROVIDER CONTACT NOTE (CRITICAL VALUE NOTIFICATION) - SITUATION
lactate 4.3
Patient with gram negative rods growing in the anerobic bottle   PCR will be ready in 3 hours

## 2020-11-03 NOTE — CONSULT NOTE ADULT - ASSESSMENT
89y old Female coming from home with hx of Dementia (FAST 7c), syncope s/p PPM, CHF with left pleural effusion in the past, asthma, Iron deficiency anemia, admitted 11/2 for progressively worsening oral intake (previously would eat pureed food but last 1-2 weeks stopped and wouldn't drink either), increasing confusion, and decreased responsiveness. Found here to have leukocytosis (sepsis, with elevated lactate 4.3), FLORA (Cr>3), negative CXR, but CT abd/pelvis showing deep bl hip decubiti. Labs later showing GNR in blood cx. Palliative Care consulted to assist with establishing GOC.     1) Pain  - pt exhibiting some nonverbal signs of pain  - daughter noting that these signs are present at home as well, unsure if pt is truly in pain, but open to us providing some relief as pt unable to clarify at this time  - added few doses of IV Tylenol to try, also in hopes of avoiding any negative effects of already fragile mentation  - considering pt's refusal of po intake and poor kidney function, if above insufficient, also ordered low dose IV dilaudi 0.2mg q4h prn to be given with wound care  - if able to swallow will transition to po regimen    2) Sepsis: Decubitus   - bl decubitus ulcers on hips and other areas of skin breakdown  - lactate improved with IVF and abx  - blood cx with GNR result as of today  - ID notes appreciated- suggesting IV abx and inquiry into possible surgical debridement  - wound care notes also appreciated- documenting several areas of skin breakdown including bl stage 4 pressure injuries, R knee stage 3, sacral stage 3, all with poor chance of healing due to nutritional status and comorbidities. Also suggesting surgical consult for possible debridement    3) FLORA  - likely due to prerenal cause (poor intake) +/- sepsis  - improving slowly with IVF  - avoid nephrotoxic agents as able  - US negative for hydro    4) DVT  - R extensive DVT likely to be chronic  - according to notes, considering age, comorbidities and anemia, holding off on anticoagulation due to risk    5) Anemia  - chronic iron deficiency  - s/p 2 U PRBCs with appropriate improvement    6) AMS/Dementia  - underlying hx of dementia, with superimposed hypoactive delirium from acute metabolic derangements  - fall and aspiration precautions  - daughter suggested hx of poor sleep and calling out  - if able to swallow after swallow eval would suggest seroqule 25 qhs; if unable to swallow can give low dose ativan 0.25 mg IV q6h prn    7) Debility  - PPS<40%  - dependent for ADLs  - nutrition notes appreciated- severe muscle and fat wasting, severe protein calorie malnutrition  - as per RN, awaiting speech and swallow eval    8) Prognosis  - poor  - in setting of advanced dementia, advanced age, dependence for ADLs, FTT, multiple areas of skin breakdown, severe protein calorie malnutrition, PPS<40%, albumin 2.0, pt would fit criteria for hospice. However, unclear if family would be ready for that    9) GOC/Advanced Directives  - pt does not have capacity for decision making  - no HCP on file (what is in [employer] is an incomplete form and family confirms no official proxy): thus children serving as surrogate decision makers: Eric Barrera [phone]  - full code, will discuss (dtr Bola offered this decision again in our brief conversation today)  - GOC meeting scheduled for tomorrow, awaiting call back from Eric cc: exact time    Thank you for including us in Ms. Salazar's care. Will continue to follow with you.    Esvin Huston MD  Palliative Care Attending

## 2020-11-03 NOTE — CHART NOTE - TREATMENT: THE FOLLOWING DIET HAS BEEN RECOMMENDED
Diet, Pureed (11-03-20 @ 11:29) [Active]      Pt meets criteria for severe malnutrition in the context of chronic illness AEB severe muscle wasting, severe fat depletion, PO intake < 75% ENN > 1 month, 3+ edema    1. advance diet as per SLP recommendations 2. add Ensure enlive TID (w/ appropriate thickness as per SLP recs) 3. clarify GOC regarding nutrition support 4. when able provide maximum assistance w/ po intake and maintain aspiration precautions 5. weekly wt 6. add MVI w/ minerals daily, 500 mg vitamin c BID, and 225 mg zinc BID x 10days to promote wound healing.

## 2020-11-03 NOTE — PROGRESS NOTE ADULT - SUBJECTIVE AND OBJECTIVE BOX
89 y.o. female with PMHx of syncope s/p PPM, CHF with left pleural effusion in the past, asthma, Iron deficiency anemia p/w progressively worsening oral intake (previously would eat pureed food but last 1-2 weeks stopped and wouldn't drink either), became confused, would not talk or respond BIBA from home and found to be in FLORA, sepsis with BL infected hip decubitus st IV Left worse than right based on CT, Failure to thrive, anemia, RLE edema and metabolic encephalopathy.     11/3:  Pt seen.  Awake.  Moaning.  Having wound care assessment.  Trying to bite/grab nurses hand.  Confused.  Nonverbal.      Review of system- All 10 systems reviewed and is as per HPI otherwise negative.     T(C): 36.7 (20 @ 15:17), Max: 37.2 (20 @ 18:19)  HR: 104 (20 @ 15:17) (94 - 110)  BP: 114/49 (20 @ 15:17) (107/53 - 154/76)  RR: 18 (20 @ 15:17) (16 - 22)  SpO2: 94% (20 @ 15:17) (94% - 99%)  Wt(kg): --    PHYSICAL EXAM:  GENERAL: distress.  moaning.    HEAD:  Atraumatic, Normocephalic  EYES: EOMI, PERRLA  HEENT: dry MM  NECK: Supple, No JVD  NERVOUS SYSTEM: awake.  confused.  dementia.    CHEST/LUNG: Clear to auscultation bilaterally  HEART: Regular rate and rhythm; No murmurs, rubs, or gallops  ABDOMEN: Soft, Nontender, Nondistended; Bowel sounds present  GENITOURINARY- Voiding, no palpable bladder  EXTREMITIES:  extensive edema right leg.    MUSCULOSKELTAL-muscle wasting  SKIN-multiple areas of skin break down on hips/ legs.  exposed bone on bilateral hips with large ulcerations.          LABS:                        11.0   15.93 )-----------( 166      ( 2020 08:11 )             36.0     11-03    154<H>  |  117<H>  |  69<H>  ----------------------------<  104<H>  3.9   |  29  |  3.23<H>    Ca    8.1<L>      2020 08:11  Phos  4.7     11-  Mg     2.8     -    TPro  7.0  /  Alb  2.0<L>  /  TBili  0.7  /  DBili  x   /  AST  20  /  ALT  12  /  AlkPhos  105  11-02    PT/INR - ( 2020 15:27 )   PT: 13.0 sec;   INR: 1.13 ratio         PTT - ( 2020 15:27 )  PTT:29.3 sec  Urinalysis Basic - ( 2020 14:24 )    Color: Yellow / Appearance: very cloudy / S.015 / pH: x  Gluc: x / Ketone: Negative  / Bili: Moderate / Urobili: 1 mg/dL   Blood: x / Protein: 30 mg/dL / Nitrite: Negative   Leuk Esterase: Negative / RBC: 3-5 /HPF / WBC 0-2   Sq Epi: x / Non Sq Epi: Moderate / Bacteria: Occasional    Culture - Blood (20 @ 14:24)   Gram Stain:   Growth in anaerobic bottle: Gram Negative Rods     < from: CT Abdomen and Pelvis No Cont (20 @ 16:12) >  IMPRESSION:  Deep bilateral hip decubitus ulcers with exposure of the underlying greater trochanters. No definite underlying bony erosion or destruction. Small amount of gas in the left greater trochanteric bursa suggestive of bursitis.  < end of copied text >    MEDICATIONS  (STANDING):  acetaminophen  IVPB .. 1000 milliGRAM(s) IV Intermittent once  dextrose 5%. 1000 milliLiter(s) (80 mL/Hr) IV Continuous <Continuous>  doxycycline IVPB 100 milliGRAM(s) IV Intermittent every 12 hours  heparin   Injectable 5000 Unit(s) SubCutaneous every 12 hours  metoprolol succinate ER 25 milliGRAM(s) Oral daily  piperacillin/tazobactam IVPB.. 3.375 Gram(s) IV Intermittent every 12 hours    MEDICATIONS  (PRN):  acetaminophen  IVPB .. 1000 milliGRAM(s) IV Intermittent once PRN Mild Pain (1 - 3), Moderate Pain (4 - 6), Severe Pain (7 - 10)  HYDROmorphone  Injectable 0.2 milliGRAM(s) IV Push every 4 hours PRN pain or dyspnea

## 2020-11-03 NOTE — SWALLOW BEDSIDE ASSESSMENT ADULT - SWALLOW EVAL: DIAGNOSIS
1) The pt exhibits frequent psychogenically reduced willingness to accept PO atop Oropharyngeal Dysphagia which subjectively appeared to be a grossly functional condition with a restricted inventory of modified food consistencies when she is cognizant enough/willing to accept PO. Overt aspiration signs noted with thin liquids. Dysphagia with long term component in setting of advanced Dementia/muscle wasting. At chronic nutrition risk.  2) Pt is awake. Affect is flat. Pt is communicatively passive & internally distractible. Joint attention not sustained for any prolonged periods of time. Unable to direct to structured communication tasks, not did she follow commands. Of note is that she did occasionally moan without clear intent but no decipherable verbalizations could be elicited. Prominent Cognitive Dysfunction is evident. Pt's needs must be anticipated. 1) The pt exhibits frequent psychogenically reduced willingness to accept PO atop Oropharyngeal Dysphagia which subjectively appeared to be a grossly functional condition with a restricted inventory of modified food consistencies when she was cognizant enough/willing to accept PO. Overt  aspiration signs noted with thin liquids. Dysphagia with long term component in setting of advanced Dementia/muscle wasting. At chronic nutrition risk.  2) Pt is awake. Affect is flat. Pt is communicatively passive & internally distractible. Joint attention not sustained for any prolonged periods of time. Unable to direct to structured communication tasks, nor did she follow commands. Of note is that she did occasionally spontaneously moan without clear intent but no decipherable verbalizations could be elicited. Prominent Cognitive Dysfunction is evident. Pt's needs must be anticipated.

## 2020-11-03 NOTE — SWALLOW BEDSIDE ASSESSMENT ADULT - ORAL PHASE
During limited intakes. bolus formation/transfer were achieved via moderately prolonged disorganized discontinuous lingual pumping actions that were felt to be grossly functional with some of the above modified food textures. Piecemeal deglutition was evident. Mild tongue debris noted with puree food. During limited intakes, bolus formation/transfer were achieved via moderately prolonged disorganized discontinuous lingual pumping actions that were felt to be grossly functional with some of the above modified food textures. Piecemeal deglutition was evident. Mild tongue debris noted with puree food.

## 2020-11-03 NOTE — SWALLOW BEDSIDE ASSESSMENT ADULT - SWALLOW EVAL: RECOMMENDED DIET
SUGGEST A DYSPHAGIA 1 DIET WITH NECTAR T6HICK LIQUIDS AS THIS IS THE LEAST RESTRICTIVE TOLERABLE DIET CONSISTENCIES FROM AN OROPHARYNGEAL SWALLOWING STANCE ON EXAM WHEN SHE WAS WILLING TO ACCEPT FOOD WHICH WAS OFTEN NOT THE CASE.

## 2020-11-03 NOTE — DIETITIAN INITIAL EVALUATION ADULT. - OTHER INFO
89 y.o. female with PMHx of syncope s/p PPM, CHF with left pleural effusion in the past, asthma, Iron deficiency anemia p/w progressively worsening oral intake (previously would eat pureed food but last 1-2 weeks stopped and wouldn't drink either), became confused, would not talk or respond BIBA from home and found to be in FLORA, sepsis with BL infected hip decubitus st IV Left worse than right based on CT, Failure to thrive, anemia, RLE edema and metabolic encephalopathy.    RD consulted 2/2 stage 2 pressure ulcer or greater. During visit pt lying in bed, speaks Spanish only and noted to be confused. RD contacted dtr to obtain information. Dtr reports that pt recent UBW 85# however over the past 6 mths pt has lost ~5#, (5.8%BW). Dtr mentions that 1 year ago pt was # indicating wt loss 18# (18% BW) . Daughter provides food preferences which will be communicated to kitchen, mashed potatoes, sweet foods, coffee, chocolate ensure. Pending SLP evaluation for appropriate diet consistencies and thicknesses pt currently NPO. No reported food allergies. No noted n/v/c/d at this time. PT w/ multiple pressure ulcers: unstageable: left and righ hips, and stage 2 on sacrum requiring increased energy and protein intake to promote wound healing. Noted consult for palliative places, recommend clarify GOC regarding nurtition support however given current clinical condition nutrition support not recommended due to overall declining medical status which evidenced based studies indicate EN is not effective in prolonging survival and improving quality of life. It can also increase risk of aspiration pneumonia as well as other related issues.

## 2020-11-03 NOTE — DIETITIAN INITIAL EVALUATION ADULT. - ADD RECOMMEND
1. advance diet as per SLP recommendations 2. add Ensure enlive TID (w/ appropriate thickness as per SLP recs) 3. clarify GOC regarding nutrition support 4. when able provide maximum assistance w/ po intake and maintain aspiration precautions 5. weekly wt 6. add MVI w/ minerals daily, 500 mg vitamin c BID, and 225 mg zinc BID x 10days to promote wound healing.

## 2020-11-03 NOTE — SWALLOW BEDSIDE ASSESSMENT ADULT - COMMENTS
The pt was admitted to  with altered mentation, failure to thrive and reduced PO intake. Hospital course is notable for frequent PO refusal, low albumin, and bilateral hips decubs with surrounding cellulitis/OM. The pt is followed by Palliative Care. This profile is superimposed upon a history of advanced Dementia, asthma, CHF with left pleural effusion, pacemaker placement, and iron deficiency anemia. The patient was admitted to  with altered mentation, failure to thrive and reduced PO intake. Hospital course is notable for frequent PO refusal, low albumin, and bilateral hip decubs with surrounding cellulitis/OM. The pt is followed by Palliative Care. This profile is superimposed upon a history of advanced Dementia, asthma, CHF with left pleural effusion, pacemaker placement, and iron deficiency anemia.

## 2020-11-03 NOTE — SWALLOW BEDSIDE ASSESSMENT ADULT - PHARYNGEAL PHASE
Swallow trigger was timely to mildly latent. Laryngeal lift on palpation during swallowing trials was mildly to moderately reduced but felt to be grossly functional with some of the above modified food textures. Post prandial coughing was demonstrated with thin liquids only, despite cues to employ compensatory swallowing maneuvers. NO behavioral aspiration signs exhibited with nectar thick liquids and pureed foods.

## 2020-11-03 NOTE — ADVANCED PRACTICE NURSE CONSULT - ASSESSMENT
HPI:  This is a 89 year old female that was admitted to the hospital on 11/2/2020 for sepsis.  PMH- syncope s/p PPM, CHF with left pleural effusion in the past, asthma, Iron deficiency anemia p/w progressively worsening oral intake (previously would eat pureed food but last 1-2 weeks stopped and wouldn't drink either), became confused, would not talk or respond BIBA from home and found to be in FLORA, sepsis with BL infected hip decubitus st IV Left worse than right based on CT, Failure to thrive, anemia, RLE edema and metabolic encephalopathy. (02 Nov 2020 19:31)  Consulted to evaluate patient’s multiple pressure injuries that are present on admission. Patient presents on an [employer] 9000 MRS on her left side with heels elevated off mattress.  Patient’s lower extremities noted to be contracted and RLE more edematous than LLE. Patient is alert and is making mumbling.   Right trochanter with a full-thickness wound measuring [id number] with undermining noted at 3 o’clock measuring 3cm. Wound bed with 80% tan slough and 20% pink tissue. Positive probe to the bone.  Would classify as a stage 4 pressure injury. Odor noted. Periwound with intact DTI noted. Wound irrigated with normal saline. Skin prep applied to periwound. Medihoney applied to wound bed for autolytic debridement and aqaucel applied to fill in deadspace. Foam dressing placed as cover dressing.   Abrasion and linear wound noted to anterior right ankle. Wound clean and pink. Irrigated with normal saline and foam dressing placed as cover dressing.  Right knee with a stage 3 pressure injury measuring [id number]. Wound bed with 100% pink tissue. No odor, periwound intact.   Left hip with a full-thickness wound measuring [id number]. 80% black eschar and 20% pink tissue. Odor noted. Periwound with erythema. Positive probe to the bone. Would classify as a stage 4 pressure injury. Odor noted. Wound irrigated with normal saline. Skin prep applied to periwound. Medihoney applied to wound bed for autolytic debridement and aqaucel applied to fill in deadspace. Foam dressing placed as cover dressing.   Sacrum with a stage 3 pressure injury measuring [id number]. Wound with 100% pink tissue. No odor noted, periwound intact. Wound irrigated with normal saline. Foam dressing placed as cover dressing.  Both lower extremities also noted to have multiple scattered intact, deep tissue injuries to lateral and medial aspect of foot. Extremities are warm to touch with weak pedal pulses. Skin prep applied to wounds and left open to air.  Wounds are concerning due to size and stage. Wounds are not likely to heal due to patient’s overall status and poor nutritional status. Would consider surgical consult for possible debridement of trochanteric wounds, but wounds are still not likely to heal.  PAST MEDICAL & SURGICAL HISTORY:  Dementia  CHF (congestive heart failure)  Asthma  History of permanent cardiac pacemaker placement  REVIEW OF SYSTEMS  Patient unable to answer  MEDICATIONS  (STANDING):  dextrose 5%. 1000 milliLiter(s) (80 mL/Hr) IV Continuous <Continuous>  doxycycline IVPB 100 milliGRAM(s) IV Intermittent every 12 hours  heparin   Injectable 5000 Unit(s) SubCutaneous every 12 hours  metoprolol succinate ER 25 milliGRAM(s) Oral daily  piperacillin/tazobactam IVPB.. 3.375 Gram(s) IV Intermittent every 12 hours    Allergies  aspirin (Other (Severe))  Intolerances  SOCIAL HISTORY:  FAMILY HISTORY:  No pertinent family history in first degree relatives  Vital Signs Last 24 Hrs  T(C): 36.3 (03 Nov 2020 08:31), Max: 37.5 (02 Nov 2020 14:38)  T(F): 97.4 (03 Nov 2020 08:31), Max: 99.5 (02 Nov 2020 14:38)  HR: 109 (03 Nov 2020 04:20) (94 - 126)  BP: 115/69 (03 Nov 2020 08:31) (102/75 - 154/76)  BP(mean): 97 (02 Nov 2020 21:47) (84 - 97)  RR: 16 (03 Nov 2020 04:20) (15 - 24)  SpO2: 98% (03 Nov 2020 04:20) (96% - 99%)  PHYSICAL EXAM:  Constitutional:  Eyes: conjunctiva and sclera clear  ENMT: Patient with poor dentation  Neck:  Breasts:  Back:  Respiratory: Respirations even and unlabored   Cardiovascular:  Gastrointestinal: Incontinent of stool  Genitourinary: Incontinent of urine  Rectal:  Extremities:  Vascular:  Neurological: Alert and confused  Skin: Multiple full-thickness pressure injuries noted – see narrative for details  Lymph Nodes:  Musculoskeletal:  Psychiatric:  LABS:                        11.0   15.93 )-----------( 166      ( 03 Nov 2020 08:11 )             36.0

## 2020-11-03 NOTE — DIETITIAN INITIAL EVALUATION ADULT. - ORAL INTAKE PTA/DIET HISTORY
As per pts daughter pt had a significant decrease in po intake consuming little to no food/drink x 1 week PTA. Dtr reports family started to puree pts foods 2-3 weeks ago to make it easier for pt to consume foods. Prior to this pt was taking a soft diet, avoided meats as they were too difficult to chew. Daughter reports pt takes ensure a couple times a week as she is not always accepting of it. Based on diet recall pt likely not meeting at least 80% ENN > 1 month.

## 2020-11-03 NOTE — DIETITIAN INITIAL EVALUATION ADULT. - FEEDING SKILL
dtr reports that family always tries to encourage pt to feed herself however at this time pt is refusing po solids and fluids and even some medications.

## 2020-11-03 NOTE — CONSULT NOTE ADULT - ASSESSMENT
89 y/o female with h/o syncope s/p PPM, CHF with left pleural effusion in the past, asthma, iron deficiency anemia was admitted on 11/2 for poorly healing hip ulcerations and increased confusion. She was noted with progressively worsening oral intake (previously would eat pureed food but last 1-2 weeks PTA, she stopped and wouldn't drink either), increased. confused, would not talk or respond> IN ER she was noted with high creatinine and b/l hip decubitus st IV Left worse than right. She received zosyn IV and vancomycin IV.     1. Low grade fever. Possible sepsis. B/l hip decubitus ulcers. Encephalopathy. ARF.   -leukocytosis  -obtain BC x 2, urine c/s  -given vancomycin 1 gm IV x 1 in ER  -agree with zosyn 3 gm IV q12h; add doxycycline 100 mg IV q12h  -reason for abx use and side effects reviewed; monitor BMP  -local wound care  -consider surgical evaluation for possible debriedement  -old chart reviewed to assess prior cultures  -monitor temps  -f/u CBC  -supportive care  2. Other issues:   -care per medicine           89 y/o female with h/o syncope s/p PPM, CHF with left pleural effusion in the past, asthma, iron deficiency anemia was admitted on 11/2 for poorly healing hip ulcerations and increased confusion. She was noted with progressively worsening oral intake (previously would eat pureed food but last 1-2 weeks PTA, she stopped and wouldn't drink either), increased. confused, would not talk or respond> IN ER she was noted with high creatinine and b/l hip decubitus st IV Left worse than right. She received zosyn IV and vancomycin IV.     1. Low grade fever. Possible sepsis. B/l hip decubitus ulcers. B/l hips acute on chronic OM with surrounding cellulitis. Encephalopathy. ARF.   -leukocytosis  -obtain BC x 2, urine c/s  -given vancomycin 1 gm IV x 1 in ER  -agree with zosyn 3 gm IV q12h; add doxycycline 100 mg IV q12h  -reason for abx use and side effects reviewed; monitor BMP  -local wound care  -consider surgical evaluation for possible debridement   -aspiration precautions  -old chart reviewed to assess prior cultures  -monitor temps  -f/u CBC  -supportive care  2. Other issues:   -care per medicine

## 2020-11-03 NOTE — DIETITIAN INITIAL EVALUATION ADULT. - PERTINENT LABORATORY DATA
11-03 Na154 mmol/L<H> Glu 104 mg/dL<H> K+ 3.9 mmol/L Cr  3.23 mg/dL<H> BUN 69 mg/dL<H> Phos n/a   Alb n/a   PAB n/a

## 2020-11-03 NOTE — DIETITIAN INITIAL EVALUATION ADULT. - MALNUTRITION
Pt meets criteria for severe malnutrition in the context of chronic illness AEB severe muscle wasting, severe fat depletion, PO intake < 75% ENN > 1 month, 3+ edema

## 2020-11-03 NOTE — CONSULT NOTE ADULT - SUBJECTIVE AND OBJECTIVE BOX
Patient is a 89y old  Female who presents with a chief complaint of not eating/confused     HPI:  89 y/o female with h/o syncope s/p PPM, CHF with left pleural effusion in the past, asthma, iron deficiency anemia was admitted on  for poorly healing hip ulcerations and increased confusion. She was noted with progressively worsening oral intake (previously would eat pureed food but last 1-2 weeks PTA, she stopped and wouldn't drink either), increased. confused, would not talk or respond> IN ER she was noted with high creatinine and b/l hip decubitus st IV Left worse than right. She received zosyn IV and vancomycin IV.     PMH: as above  PSH: as above  Meds: per reconciliation sheet, noted below  MEDICATIONS  (STANDING):  heparin   Injectable 5000 Unit(s) SubCutaneous every 12 hours  lactated ringers. 1000 milliLiter(s) (80 mL/Hr) IV Continuous <Continuous>  metoprolol succinate ER 25 milliGRAM(s) Oral daily  piperacillin/tazobactam IVPB.. 3.375 Gram(s) IV Intermittent every 12 hours    MEDICATIONS  (PRN):    Allergies    aspirin (Other (Severe))    Intolerances      Social: no smoking, no alcohol, no illegal drugs; no recent travel, no exposure to TB  FAMILY HISTORY:  No pertinent family history in first degree relatives      no history of premature cardiovascular disease in first degree relatives    ROS: the patient denies fever, no chills, no HA, no seizures, no dizziness, no sore throat, no nasal congestion, no blurry vision, no CP, no palpitations, no SOB, no cough, no abdominal pain, no diarrhea, no N/V, no dysuria, no leg pain, no claudication, no rash, no joint aches, no rectal pain or bleeding, no night sweats  All other systems reviewed and are negative    Vital Signs Last 24 Hrs  T(C): 36.3 (2020 08:31), Max: 37.5 (2020 14:38)  T(F): 97.4 (2020 08:31), Max: 99.5 (2020 14:38)  HR: 109 (2020 04:20) (94 - 126)  BP: 115/69 (2020 08:31) (102/75 - 154/76)  BP(mean): 97 (2020 21:47) (84 - 97)  RR: 16 (2020 04:20) (15 - 24)  SpO2: 98% (2020 04:20) (96% - 99%)  Daily Height in cm: 149.86 (2020 15:43)    Daily     PE:    Constitutional:  No acute distress  HEENT: NC/AT, EOMI, PERRLA, conjunctivae clear; ears and nose atraumatic; pharynx benign  Neck: supple; thyroid not palpable  Back: no tenderness  Respiratory: respiratory effort normal; clear to auscultation  Cardiovascular: S1S2 regular, no murmurs  Abdomen: soft, not tender, not distended, positive BS; no liver or spleen organomegaly  Genitourinary: no suprapubic tenderness  Lymphatic: no LN palpable  Musculoskeletal: no muscle tenderness, no joint swelling or tenderness  Extremities: no pedal edema  Neurological/ Psychiatric: confusion, judgement and insight impaired; moving all extremities  Skin: no rashes; no palpable lesions    Labs: all available labs reviewed                        11.0   15.93 )-----------( 166      ( 2020 08:11 )             36.0     11-03    154<H>  |  117<H>  |  69<H>  ----------------------------<  104<H>  3.9   |  29  |  3.23<H>    Ca    8.1<L>      2020 08:11  Phos  4.7     11-02  Mg     2.8     11-    TPro  7.0  /  Alb  2.0<L>  /  TBili  0.7  /  DBili  x   /  AST  20  /  ALT  12  /  AlkPhos  105  11-02     LIVER FUNCTIONS - ( 2020 14:24 )  Alb: 2.0 g/dL / Pro: 7.0 gm/dL / ALK PHOS: 105 U/L / ALT: 12 U/L / AST: 20 U/L / GGT: x           Urinalysis Basic - ( 2020 14:24 )    Color: Yellow / Appearance: very cloudy / S.015 / pH: x  Gluc: x / Ketone: Negative  / Bili: Moderate / Urobili: 1 mg/dL   Blood: x / Protein: 30 mg/dL / Nitrite: Negative   Leuk Esterase: Negative / RBC: 3-5 /HPF / WBC 0-2   Sq Epi: x / Non Sq Epi: Moderate / Bacteria: Occasional          COVID-19 PCR: NotDetec (20 @ 15:05)    Radiology: all available radiological tests reviewed    < from: CT Abdomen and Pelvis No Cont (20 @ 16:12) >  Deep bilateral hip decubitus ulcers with exposure of the underlying greater trochanters. No definite underlying bony erosion or destruction. Small amount of gas in the left greater trochanteric bursa suggestive of bursitis.    < end of copied text >      Advanced directives addressed: full resuscitation Patient is a 89y old  Female who presents with a chief complaint of not eating/confused     HPI:  89 y/o female with h/o syncope s/p PPM, CHF with left pleural effusion in the past, asthma, iron deficiency anemia was admitted on  for poorly healing hip ulcerations and increased confusion. She was noted with progressively worsening oral intake (previously would eat pureed food but last 1-2 weeks PTA, she stopped and wouldn't drink either), increased. confused, would not talk or respond> IN ER she was noted with high creatinine and b/l hip decubitus st IV Left worse than right. She received zosyn IV and vancomycin IV.     PMH: as above  PSH: as above  Meds: per reconciliation sheet, noted below  MEDICATIONS  (STANDING):  heparin   Injectable 5000 Unit(s) SubCutaneous every 12 hours  lactated ringers. 1000 milliLiter(s) (80 mL/Hr) IV Continuous <Continuous>  metoprolol succinate ER 25 milliGRAM(s) Oral daily  piperacillin/tazobactam IVPB.. 3.375 Gram(s) IV Intermittent every 12 hours    MEDICATIONS  (PRN):    Allergies    aspirin (Other (Severe))    Intolerances      Social: no smoking, no alcohol, no illegal drugs; no recent travel, no exposure to TB  FAMILY HISTORY:  No pertinent family history in first degree relatives      no history of premature cardiovascular disease in first degree relatives    ROS: the patient is confused; unobtainable  All other systems reviewed and are negative    Vital Signs Last 24 Hrs  T(C): 36.3 (2020 08:31), Max: 37.5 (2020 14:38)  T(F): 97.4 (2020 08:31), Max: 99.5 (2020 14:38)  HR: 109 (2020 04:20) (94 - 126)  BP: 115/69 (2020 08:31) (102/75 - 154/76)  BP(mean): 97 (2020 21:47) (84 - 97)  RR: 16 (2020 04:20) (15 - 24)  SpO2: 98% (2020 04:20) (96% - 99%)  Daily Height in cm: 149.86 (2020 15:43)    Daily     PE:    Constitutional:  No acute distress  HEENT: NC/AT, EOMI, PERRLA, conjunctivae clear; ears and nose atraumatic; pharynx benign  Neck: supple; thyroid not palpable  Back: no tenderness  Respiratory: respiratory effort normal; clear to auscultation  Cardiovascular: S1S2 regular, no murmurs  Abdomen: soft, not tender, not distended, positive BS; no liver or spleen organomegaly  Genitourinary: no suprapubic tenderness  Lymphatic: no LN palpable  Musculoskeletal: no muscle tenderness, no joint swelling or tenderness  Right hip decubitus ulcer with palpable bone, areas of necrosis, foul smelling; surrounding erythema; scant discharge  Large left hip decubitus ulcer with palpable bone and partial necrotic base  Extremities: no pedal edema  Neurological/ Psychiatric: confusion, judgement and insight impaired; moving all extremities  Skin: no rashes; no palpable lesions    Labs: all available labs reviewed                        11.0   15.93 )-----------( 166      ( 2020 08:11 )             36.0     11-    154<H>  |  117<H>  |  69<H>  ----------------------------<  104<H>  3.9   |  29  |  3.23<H>    Ca    8.1<L>      2020 08:11  Phos  4.7     -  Mg     2.8         TPro  7.0  /  Alb  2.0<L>  /  TBili  0.7  /  DBili  x   /  AST  20  /  ALT  12  /  AlkPhos  105       LIVER FUNCTIONS - ( 2020 14:24 )  Alb: 2.0 g/dL / Pro: 7.0 gm/dL / ALK PHOS: 105 U/L / ALT: 12 U/L / AST: 20 U/L / GGT: x           Urinalysis Basic - ( 2020 14:24 )    Color: Yellow / Appearance: very cloudy / S.015 / pH: x  Gluc: x / Ketone: Negative  / Bili: Moderate / Urobili: 1 mg/dL   Blood: x / Protein: 30 mg/dL / Nitrite: Negative   Leuk Esterase: Negative / RBC: 3-5 /HPF / WBC 0-2   Sq Epi: x / Non Sq Epi: Moderate / Bacteria: Occasional          COVID-19 PCR: NotDetec (20 @ 15:05)    Radiology: all available radiological tests reviewed    < from: CT Abdomen and Pelvis No Cont (20 @ 16:12) >  Deep bilateral hip decubitus ulcers with exposure of the underlying greater trochanters. No definite underlying bony erosion or destruction. Small amount of gas in the left greater trochanteric bursa suggestive of bursitis.    < end of copied text >      Advanced directives addressed: full resuscitation

## 2020-11-03 NOTE — PROGRESS NOTE ADULT - ASSESSMENT
89 y.o. female with PMHx of syncope s/p PPM, CHF with left pleural effusion in the past, asthma, Iron deficiency anemia p/w progressively worsening oral intake (previously would eat pureed food but last 1-2 weeks stopped and wouldn't drink either), became confused, would not talk or respond BIBA from home and found to be in FLORA, sepsis with BL infected hip decubitus st IV Left worse than right based on CT, Failure to thrive, anemia, RLE edema and metabolic encephalopathy.    #Sepsis presumed secondary to extensive decubitus ulcerations/ hip OM:    Present on admission.    Blood cx with GNR.     Urine cx negative.    CXR clear.    Appreciate ID input.    Cont Vanco/ Doxy for now.    F/u cultures.    Tried to reach out to family to discuss goals of care.    Palliative eval.    Wound care.    Plastics eval.  Likely needs debridement depending on goals of care from family.      #FLORA:    Likely due to ATN with sepsis and decreased po intake.    Trend with labs.    Cont IVF.      #Anemia with Hx of KEHINDE:    Hgb improved from 7 to 11.    Trend.    No obvious acute bleeding.      #FTT due to advanced age/ dementia:     - nutritionist eval    #Metabolic encephalopathy:    Secondary to sepsis with underlying dementia.      #Right LE DVT:    Extensive occlusive clot of Right femoral vein through right posterial tibial vein.    AC was not started due to anemia-- tried to call family to discuss.  Await call back.    Will hold off on full AC for now as presumed more chronic and patient is functional quadriplegic due to FTT/ dementia.      #Functional quadriplegia due to advanced dementia    #Dysphagia:    Dysphagia 1 with nectar liquids.  Speech f/u.      #DVT Proph:  Heparin SQ.      CODE status/ Goals of Care:  Tried to call family twice.  Left message for family (Bola).  Asked to call back to discuss plan of care.    As per conversation yesterday, FULL CODE. 89 y.o. female with PMHx of syncope s/p PPM, CHF with left pleural effusion in the past, asthma, Iron deficiency anemia p/w progressively worsening oral intake (previously would eat pureed food but last 1-2 weeks stopped and wouldn't drink either), became confused, would not talk or respond BIBA from home and found to be in FLORA, sepsis with BL infected hip decubitus st IV Left worse than right based on CT, Failure to thrive, anemia, RLE edema and metabolic encephalopathy.    #Sepsis presumed secondary to extensive decubitus ulcerations/ hip OM:    Present on admission.    Blood cx with GNR.     Urine cx negative.    CXR clear.    Appreciate ID input.    Cont Vanco/ Doxy for now.    F/u cultures.    Tried to reach out to family to discuss goals of care.    Palliative eval.    Wound care.    Plastics eval.  Likely needs debridement depending on goals of care from family.    Pain control.      #FLORA:    Likely due to ATN with sepsis and decreased po intake.    Trend with labs.    Cont IVF.      #Hypernatremia:    Na up to 154-- change IVF to D5W.    Repeat labs in am.      #Anemia with Hx of KEHINDE:    Hgb improved from 7 to 11.    Trend.    No obvious acute bleeding.      #FTT due to advanced age/ dementia:     - nutritionist eval    #Metabolic encephalopathy:    Secondary to sepsis with underlying dementia.      #Right LE DVT:    Extensive occlusive clot of Right femoral vein through right posterial tibial vein.    AC was not started due to anemia-- tried to call family to discuss.  Await call back.    Will hold off on full AC for now as presumed more chronic and patient is functional quadriplegic due to FTT/ dementia.      #Functional quadriplegia due to advanced dementia    #Dysphagia:    Dysphagia 1 with nectar liquids.  Speech f/u.      #DVT Proph:  Heparin SQ.      CODE status/ Goals of Care:  Tried to call family twice.  Left message for family (Bola).  Asked to call back to discuss plan of care.    As per conversation yesterday, FULL CODE.

## 2020-11-03 NOTE — SWALLOW BEDSIDE ASSESSMENT ADULT - ASR SWALLOW LINGUAL MOBILITY
Unable to formally assess due to altered cognition with reduced active participation/decreased compliance.

## 2020-11-03 NOTE — CONSULT NOTE ADULT - SUBJECTIVE AND OBJECTIVE BOX
HPI: Ms. Salazar is a 89y old Female coming from home with hx of Dementia (FAST 7c), syncope s/p PPM, CHF with left pleural effusion in the past, asthma, Iron deficiency anemia, admitted 11/2 for progressively worsening oral intake (previously would eat pureed food but last 1-2 weeks stopped and wouldn't drink either), increasing confusion, and decreased responsiveness. Found here to have leukocytosis (sepsis, with elevated lactate 4.3), FLORA (Cr>3), negative CXR, but CT abd/pelvis showing deep bl hip decubiti. Labs later showing GNR in blood cx. Palliative Care consulted to assist with establishing GOC.     Met Ms. Salazar this afternoon. Pt moaning, quiets when name called, able to say good morning, but otherwise mumbling and groaning. Allows exam, but does not follow commands. As per RN, pt appearing uncomfortable, moaning in what seems like pain, despite attempts at repositioning for comfort. Discussed low dose pain med to be administered for acute discomfort.     Called daughter Eric [phone] who pt lives with. She explained that the pt always moans at home, speaks Vietnamese, and when asked if she has pain says no. However, she is open to allowing pain medication here for nonverbal signs of pain that we may see. She also notes that the pt is usually up at night, sometimes calling out, which neurologist tried Trazadone for without no effect. Doctor then prescribed a new medication that they did not get to start as pt already stopped eating and taking meds by then. Eric will bring the name of this med for nursing when she visits later. Outside of that she was open to GOC conversation via phone tomorrow with her siblings Provided two times 11am and 2pm as availabilities and our phone number. Eric agreed to call and leave a message noting which time worked best for all of them.     PAIN: (x )Yes   ( )No- grimacing, calling out at times, appearing uncomfortable, though unable to endorse either way  DYSPNEA: ( ) Yes  (x ) No- no nonverbal signs of dyspnea    PAST MEDICAL & SURGICAL HISTORY:  Dementia  CHF (congestive heart failure)  Asthma  History of permanent cardiac pacemaker placement        SOCIAL HX: lives with family    Hx opiate tolerance ( )YES  (x )NO    Baseline ADLs  (Prior to Admission)  ( ) Independent   (x )Dependent    FAMILY HISTORY:  Unable to gather 2/2 to dementia        Review of Systems:  Unable to gather 2/2 to dementia      PHYSICAL EXAM:    Vital Signs Last 24 Hrs  T(C): 36.3 (03 Nov 2020 08:31), Max: 37.5 (02 Nov 2020 14:38)  T(F): 97.4 (03 Nov 2020 08:31), Max: 99.5 (02 Nov 2020 14:38)  HR: 109 (03 Nov 2020 04:20) (94 - 126)  BP: 115/69 (03 Nov 2020 08:31) (102/75 - 154/76)  BP(mean): 97 (02 Nov 2020 21:47) (84 - 97)  RR: 16 (03 Nov 2020 04:20) (15 - 24)  SpO2: 98% (03 Nov 2020 04:20) (96% - 99%)  Daily Height in cm: 149.86 (02 Nov 2020 15:43)    Daily     PPSV2:  20-30 %  FAST: 7c    General: Elderly female, cachectic, ill-appearing  Mental Status: AOx1 (responds to her name, otherwise unable to gather)  HEENT: dmm, + temporal wasting  Lungs: clear  Cardiac: + s1 s2 rrr  GI: soft nt nd + bs  : incontinent  MSK/skin: contracted, sitting on legs, multiple areas of skin breakdown over bl hips, R knee, muscle and fat wasting throughout  Neuro: limited by confusion      LABS:                        11.0   15.93 )-----------( 166      ( 03 Nov 2020 08:11 )             36.0     11-03    154<H>  |  117<H>  |  69<H>  ----------------------------<  104<H>  3.9   |  29  |  3.23<H>    Ca    8.1<L>      03 Nov 2020 08:11  Phos  4.7     11-02  Mg     2.8     11-02    TPro  7.0  /  Alb  2.0<L>  /  TBili  0.7  /  DBili  x   /  AST  20  /  ALT  12  /  AlkPhos  105  11-02    PT/INR - ( 02 Nov 2020 15:27 )   PT: 13.0 sec;   INR: 1.13 ratio         PTT - ( 02 Nov 2020 15:27 )  PTT:29.3 sec  Albumin: Albumin, Serum: 2.0 g/dL (11-02 @ 14:24)      Allergies    aspirin (Other (Severe))    Intolerances      MEDICATIONS  (STANDING):  dextrose 5%. 1000 milliLiter(s) (80 mL/Hr) IV Continuous <Continuous>  doxycycline IVPB 100 milliGRAM(s) IV Intermittent every 12 hours  heparin   Injectable 5000 Unit(s) SubCutaneous every 12 hours  metoprolol succinate ER 25 milliGRAM(s) Oral daily  piperacillin/tazobactam IVPB.. 3.375 Gram(s) IV Intermittent every 12 hours    MEDICATIONS  (PRN):  HYDROmorphone  Injectable 0.2 milliGRAM(s) IV Push every 4 hours PRN pain or dyspnea      RADIOLOGY/ADDITIONAL STUDIES:    EXAM:  CT ABDOMEN AND PELVIS                        PROCEDURE DATE:  11/02/2020      IMPRESSION:    Deep bilateral hip decubitus ulcers with exposure of the underlying greater trochanters. No definite underlying bony erosion or destruction. Small amount of gas in the left greater trochanteric bursa suggestive of bursitis.    MARIN EDWARD MD; Attending Radiologist  This document has been electronically signed. Nov 2 2020  5:12PM      EXAM:  US DPLX LWR EXT VEINS LTD RT                        PROCEDURE DATE:  11/02/2020      IMPRESSION:    Extensive mucosal deep venous thrombosis right common femoral vein through right posterior tibialis vein.      This critical value was discussed with Dr. Wilhelm  by telephone at the time of interpretation on 11/2/2020.    BRAYAN WARNER MD; Attending Radiologist  This document has been electronically signed. Nov 2 2020  7:46PM      EXAM:  US KIDNEYS AND BLADDER                        PROCEDURE DATE:  11/02/2020      IMPRESSION:    No sonographic evidence for hydronephrosis.    BRAYAN WARNER MD; Attending Radiologist  This document has been electronically signed. Nov 2 2020  7:51PM      EXAM:  XR CHEST PORTABLE URGENT 1V                        PROCEDURE DATE:  11/02/2020      IMPRESSION:  No active pulmonary disease.    Thank you for the courtesy of this referral.      JARRED EDOUARD MD; Attending Interventional Radiologist  This document has been electronically signed. Nov 2 2020  3:10PM

## 2020-11-03 NOTE — DIETITIAN INITIAL EVALUATION ADULT. - PERTINENT MEDS FT
MEDICATIONS  (STANDING):  dextrose 5%. 1000 milliLiter(s) (80 mL/Hr) IV Continuous <Continuous>  doxycycline IVPB 100 milliGRAM(s) IV Intermittent every 12 hours  heparin   Injectable 5000 Unit(s) SubCutaneous every 12 hours  metoprolol succinate ER 25 milliGRAM(s) Oral daily  piperacillin/tazobactam IVPB.. 3.375 Gram(s) IV Intermittent every 12 hours    MEDICATIONS  (PRN):

## 2020-11-03 NOTE — SWALLOW BEDSIDE ASSESSMENT ADULT - ADDITIONAL RECOMMENDATIONS
PALLIATIVE CARE AND DIETARY FOLLOW UP. PT IS AT CHRONIC NUTRITION RISK DUE TO DYSPHAGIA, NEED FOR DIET TEXTURE MODIFICATION AND FREQUENT FOOD REFUSAL WHICH IS BEHAVIORAL. THE LATTER ARE SEQUEL OF END STAGE DEMENTIA. I DO NOT FEEL THAT PLACEMENT OF A FEEDING TUBE WOULD ENHANCE LIFE QUALITY GIVEN HER ADVANCED AGE/ADVANCED DEMENTIA/IRREVERSIBLE DYSPHAGIA.

## 2020-11-03 NOTE — SWALLOW BEDSIDE ASSESSMENT ADULT - ASR SWALLOW RECOMMEND DIAG
NOT MBS CANDIDATE GIVEN ALTERED MENTATION/FREQUENT FOOD REFUSAL NOR DO I FEEL THAT THIS TEST WOULD CHANGE CLINICAL MANAGEMENT.

## 2020-11-04 LAB
ANION GAP SERPL CALC-SCNC: 8 MMOL/L — SIGNIFICANT CHANGE UP (ref 5–17)
BUN SERPL-MCNC: 63 MG/DL — HIGH (ref 7–23)
CALCIUM SERPL-MCNC: 8 MG/DL — LOW (ref 8.5–10.1)
CHLORIDE SERPL-SCNC: 113 MMOL/L — HIGH (ref 96–108)
CO2 SERPL-SCNC: 26 MMOL/L — SIGNIFICANT CHANGE UP (ref 22–31)
CREAT SERPL-MCNC: 3.01 MG/DL — HIGH (ref 0.5–1.3)
GLUCOSE SERPL-MCNC: 114 MG/DL — HIGH (ref 70–99)
HCT VFR BLD CALC: 35.7 % — SIGNIFICANT CHANGE UP (ref 34.5–45)
HGB BLD-MCNC: 10.9 G/DL — LOW (ref 11.5–15.5)
MCHC RBC-ENTMCNC: 24.5 PG — LOW (ref 27–34)
MCHC RBC-ENTMCNC: 30.5 GM/DL — LOW (ref 32–36)
MCV RBC AUTO: 80.2 FL — SIGNIFICANT CHANGE UP (ref 80–100)
OB PNL STL: NEGATIVE — SIGNIFICANT CHANGE UP
PLATELET # BLD AUTO: 155 K/UL — SIGNIFICANT CHANGE UP (ref 150–400)
POTASSIUM SERPL-MCNC: 3.1 MMOL/L — LOW (ref 3.5–5.3)
POTASSIUM SERPL-SCNC: 3.1 MMOL/L — LOW (ref 3.5–5.3)
RBC # BLD: 4.45 M/UL — SIGNIFICANT CHANGE UP (ref 3.8–5.2)
RBC # FLD: 17.8 % — HIGH (ref 10.3–14.5)
SODIUM SERPL-SCNC: 147 MMOL/L — HIGH (ref 135–145)
WBC # BLD: 11.12 K/UL — HIGH (ref 3.8–10.5)
WBC # FLD AUTO: 11.12 K/UL — HIGH (ref 3.8–10.5)

## 2020-11-04 PROCEDURE — 99233 SBSQ HOSP IP/OBS HIGH 50: CPT

## 2020-11-04 RX ORDER — ACETAMINOPHEN 500 MG
1000 TABLET ORAL ONCE
Refills: 0 | Status: DISCONTINUED | OUTPATIENT
Start: 2020-11-04 | End: 2020-11-11

## 2020-11-04 RX ORDER — QUETIAPINE FUMARATE 200 MG/1
25 TABLET, FILM COATED ORAL AT BEDTIME
Refills: 0 | Status: DISCONTINUED | OUTPATIENT
Start: 2020-11-04 | End: 2020-11-11

## 2020-11-04 RX ORDER — SODIUM CHLORIDE 9 MG/ML
1000 INJECTION, SOLUTION INTRAVENOUS
Refills: 0 | Status: DISCONTINUED | OUTPATIENT
Start: 2020-11-04 | End: 2020-11-05

## 2020-11-04 RX ORDER — POTASSIUM CHLORIDE 20 MEQ
10 PACKET (EA) ORAL
Refills: 0 | Status: COMPLETED | OUTPATIENT
Start: 2020-11-04 | End: 2020-11-04

## 2020-11-04 RX ADMIN — PIPERACILLIN AND TAZOBACTAM 25 GRAM(S): 4; .5 INJECTION, POWDER, LYOPHILIZED, FOR SOLUTION INTRAVENOUS at 10:23

## 2020-11-04 RX ADMIN — PIPERACILLIN AND TAZOBACTAM 25 GRAM(S): 4; .5 INJECTION, POWDER, LYOPHILIZED, FOR SOLUTION INTRAVENOUS at 22:44

## 2020-11-04 RX ADMIN — Medication 110 MILLIGRAM(S): at 21:41

## 2020-11-04 RX ADMIN — Medication 100 MILLIEQUIVALENT(S): at 10:50

## 2020-11-04 RX ADMIN — Medication 110 MILLIGRAM(S): at 09:10

## 2020-11-04 RX ADMIN — Medication 100 MILLIEQUIVALENT(S): at 13:15

## 2020-11-04 RX ADMIN — Medication 100 MILLIEQUIVALENT(S): at 14:27

## 2020-11-04 RX ADMIN — HEPARIN SODIUM 5000 UNIT(S): 5000 INJECTION INTRAVENOUS; SUBCUTANEOUS at 09:10

## 2020-11-04 RX ADMIN — HEPARIN SODIUM 5000 UNIT(S): 5000 INJECTION INTRAVENOUS; SUBCUTANEOUS at 21:40

## 2020-11-04 RX ADMIN — SODIUM CHLORIDE 100 MILLILITER(S): 9 INJECTION, SOLUTION INTRAVENOUS at 15:59

## 2020-11-04 NOTE — GOALS OF CARE CONVERSATION - ADVANCED CARE PLANNING - TREATMENT GUIDELINE COMMENT
MOLST decisions: DNR, limited interventions, DNI, do not send, no feeding tube, trial of IVF, use abx    *Spent 100 minutes discussing GOC with family including Advance care planning, explanation and discussion of advance directives, reviewed all treatment/dispo options, and MOLST.

## 2020-11-04 NOTE — GOALS OF CARE CONVERSATION - ADVANCED CARE PLANNING - WHAT MATTERS MOST
pt's comfort, chance to improve, and being able to come home. Family is devoted to pt's improvement, but open to honest impressions about all likelihoods.

## 2020-11-04 NOTE — PROGRESS NOTE ADULT - ASSESSMENT
89 y/o female with h/o syncope s/p PPM, CHF with left pleural effusion in the past, asthma, iron deficiency anemia was admitted on 11/2 for poorly healing hip ulcerations and increased confusion. She was noted with progressively worsening oral intake (previously would eat pureed food but last 1-2 weeks PTA, she stopped and wouldn't drink either), increased. confused, would not talk or respond> IN ER she was noted with high creatinine and b/l hip decubitus st IV Left worse than right. She received zosyn IV and vancomycin IV.     1. Low grade fever. Sepsis with GNR. B/l hip decubitus ulcers. B/l hips acute on chronic OM with surrounding cellulitis. Encephalopathy. ARF.   -leukocytosis  -f/u BC x 2, urine c/s  -on zosyn 3 gm IV q12h; add doxycycline 100 mg IV q12h # 2  -tolerating abx well so far; no side effects noted  -local wound care  -consider surgical evaluation for possible debridement   -aspiration precautions  -continue abx coverage  -monitor temps  -f/u CBC  -supportive care  2. Other issues:   -care per medicine

## 2020-11-04 NOTE — GOALS OF CARE CONVERSATION - ADVANCED CARE PLANNING - CONVERSATION DETAILS
Team spoke with pts family via phone to discuss goals of care, assist with planning and provide supportive counseling. Pt. lives with her son and had her niece as a her primary care taker prior to admission. Pt. is not able to walk and needs assistance with all ADLs. Team spoke with pts family via phone to discuss goals of care, assist with planning and provide supportive counseling. Pt. lives with her son and had her niece as a her primary care taker prior to admission. Pt. is not able to walk and needs assistance with all ADLs.    Pts current medical condition and goals of care discussed. Pts family does wish to continue with current treatment Team spoke with pts family via phone to discuss goals of care, assist with planning and provide supportive counseling. Pt. lives with her son and had her niece as a her primary care taker prior to admission. Pt. is not able to walk and needs assistance with all ADLs.    Pts current medical condition and goals of care discussed. Pts family does wish to continue with current treatment and would like to speak to surgery to see about possible option for debridement. We reviewed options moving forward including continue to come back and forth to the hospital VS more of a comfort focus plan of care at home with home hospice services. Hospice services and philosophy of care explained in detail. Pts family is interested in home hospice services and would like a referral place to [facility] for home hospice once pt. is ready for d/c. They are not ready for a comfort focus here in the hospital at this time and would still like to speak to surgery and continue with all current management but are open to home hospice once pt. is ready for d/c.     MOLST was reviewed in detial and completed. MOLST states DNR/DNI, Limited Medical, Do not send to hospital, No feeding tube, Trial of IV fluids, Use antibiotics.    Plan at this time is for [facility] home hospice services upon d/c. Floor SW to place referral. Emotional support provided. Our team will continue to follow.

## 2020-11-04 NOTE — PROGRESS NOTE ADULT - ASSESSMENT
89 y.o. female with PMHx of syncope s/p PPM, CHF with left pleural effusion in the past, asthma, Iron deficiency anemia p/w progressively worsening oral intake (previously would eat pureed food but last 1-2 weeks stopped and wouldn't drink either), became confused, would not talk or respond BIBA from home and found to be in FLORA, sepsis with BL infected hip decubitus st IV Left worse than right based on CT, Failure to thrive, anemia, RLE edema and metabolic encephalopathy.    #Sepsis with GNR/ Proteus bacteremia presumed secondary to extensive decubitus ulcerations/ chronic hip OM:    Present on admission.    Hemodynamics improving.      Urine cx negative.  CXR clear.    Appreciate ID input.    Cont Vanco/ Doxy for now.  Day #2.    Appreciate palliative input.  Family wishes to proceed with further wound debridement if indicated and continued wound care.    Plastics consult.    Pain control.      #FLORA:    Likely due to ATN with sepsis and decreased po intake.    Trend with labs.    Cont IVF.      #Hypernatremia:    Na improving.  Cont D5W.  Repeat labs in am.      #Chronic Anemia with Hx of KEHINDE:    Hgb improved from 7 to 11.    Hemoccult stools negative.  No obvious acute bleeding.      #FTT due to advanced age/ dementia:    Nutrition eval.      #Metabolic encephalopathy:    Secondary to sepsis with underlying dementia.      #Right LE DVT:    Extensive occlusive clot of Right femoral vein through right posterial tibial vein.    AC was not started due to anemia-- tried to call family to discuss.  Await call back.    Will hold off on full AC for now as presumed more chronic and patient is functional quadriplegic due to FTT/ dementia.      #Functional quadriplegia due to advanced dementia    #Dysphagia:    Dysphagia 1 with nectar liquids.  Speech f/u.      #DVT Proph:  Heparin SQ.      CODE status/ Goals of Care:  DNR/ DNI/ No feeding tube.  Appreciate palliative input.  Possible plan to d/c on home hospice but would want all other interventions/ treatments while inpatient.    Surrogates-- Eric:  [phone]/ Shar:  [phone]

## 2020-11-04 NOTE — PROGRESS NOTE ADULT - ASSESSMENT
89y old Female coming from home with hx of Dementia (FAST 7c), syncope s/p PPM, CHF with left pleural effusion in the past, asthma, Iron deficiency anemia, admitted 11/2 for progressively worsening oral intake (previously would eat pureed food but last 1-2 weeks stopped and wouldn't drink either), increasing confusion, and decreased responsiveness. Found here to have leukocytosis (sepsis, with elevated lactate 4.3), FLORA (Cr>3), negative CXR, but CT abd/pelvis showing deep bl hip decubiti. Labs later showing GNR in blood cx. Palliative Care consulted to assist with establishing GOC.     1) Pain  - pt exhibiting some nonverbal signs of pain  - daughter noting that these signs are present at home as well, unsure if pt is truly in pain, but open to us providing some relief as pt unable to clarify at this time  - added few doses of IV Tylenol to try, also in hopes of avoiding any negative effects of already fragile mentation  - considering pt's refusal of po intake and poor kidney function, if above insufficient, also ordered low dose IV dilaudi 0.2mg q4h prn to be given with wound care  - if able to swallow will transition to po regimen    2) Pruritis  - unclear etiology  - predates opioid admin  - unclear if this is due to true pruritis or anxiety, will inquire with family at GOC conversation  - if it is not chronic issue (much like pt moaning at home) would then endorse low dose benadryl test dose 12.5 mg IV to see if this resolves, if it does not then we should treat agitation    3Sepsis: Decubitus   - bl decubitus ulcers on hips and other areas of skin breakdown  - lactate improved with IVF and abx  - blood cx with GNR result as of today  - ID notes appreciated- suggesting IV abx and inquiry into possible surgical debridement  - wound care notes also appreciated- documenting several areas of skin breakdown including bl stage 4 pressure injuries, R knee stage 3, sacral stage 3, all with poor chance of healing due to nutritional status and comorbidities. 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  - according to notes, considering age, comorbidities and anemia, holding off on anticoagulation due to risk    6) Anemia  - chronic iron deficiency  - s/p 2 U PRBCs with appropriate improvement    7) AMS/Dementia  - underlying hx of dementia, with superimposed hypoactive delirium from acute metabolic derangements  - fall and aspiration precautions  - daughter suggested hx of poor sleep and calling out  - able to swallow after swallow eval thus added Seroquel 25 qhs; if unable to swallow can give low dose ativan 0.25 mg IV q6h prn    8) Debility  - PPS<40%  - dependent for ADLs  - nutrition notes appreciated- severe muscle and fat wasting, severe protein calorie malnutrition  - as per RN, awaiting speech and swallow eval    9) Prognosis  - poor  - in setting of advanced dementia, advanced age, dependence for ADLs, FTT, multiple areas of skin breakdown, severe protein calorie malnutrition, PPS<40%, albumin 2.0, pt would fit criteria for hospice. However, unclear if family would be ready for that    10) GOC/Advanced Directives  - pt does not have capacity for decision making  - no HCP on file (what is in [employer] is an incomplete form and family confirms no official proxy): thus children serving as surrogate decision makers: Eric Barrera [phone]  - full code, will discuss (dtr Bola offered this decision again in our brief conversation today)  - GOC meeting scheduled for today at 11am. see [facility] note that will follow    Thank you for including us in Ms. Salazar's care. Will continue to follow with you.    Esvin Huston MD  Palliative Care Attending

## 2020-11-04 NOTE — PROGRESS NOTE ADULT - SUBJECTIVE AND OBJECTIVE BOX
HPI: Pt seen and examined this am in follow up for sx and GOC. Pt initially asleep, rouses momentarily to her name, mumbles incoherently, scratches her skin and returns to sleep. As per RN, pt scratching often, which was the case overnight into yesterday as well.       PAIN: no nonverbal signs of pain  DYSPNEA: no nonverbal signs of dyspnea      ROS:  Unable to gather 2/2 to dementia      PHYSICAL EXAM:    Vital Signs Last 24 Hrs  T(C): 36.4 (2020 08:36), Max: 36.7 (2020 15:17)  T(F): 97.5 (2020 08:36), Max: 98 (2020 15:17)  HR: 80 (2020 09:17) (80 - 109)  BP: 99/39 (2020 08:36) (99/39 - 116/52)  BP(mean): --  RR: 18 (2020 08:36) (18 - 18)  SpO2: 92% (2020 08:36) (92% - 94%)  Daily     Daily Weight in k.3 (2020 06:10)      PPSV2:  20-30 %  FAST: 7c    General: Elderly female, cachectic, ill-appearing, scratching skin at times  Mental Status: AOx1 (responds to her name, otherwise unable to gather)  HEENT: dmm, + temporal wasting  Lungs: clear  Cardiac: + s1 s2 rrr  GI: soft nt nd + bs  : incontinent  MSK/skin: contracted, sitting on legs, excoriations on limbs,  multiple areas of skin breakdown over bl hips, R knee, muscle and fat wasting throughout  Neuro: limited by confusion      LABS:                        10.9   11.12 )-----------( 155      ( 2020 08:35 )             35.7     11-04    147<H>  |  113<H>  |  63<H>  ----------------------------<  114<H>  3.1<L>   |  26  |  3.01<H>    Ca    8.0<L>      2020 08:35  Phos  4.7     11-02  Mg     2.8     11-02    TPro  7.0  /  Alb  2.0<L>  /  TBili  0.7  /  DBili  x   /  AST  20  /  ALT  12  /  AlkPhos  105  11-    PT/INR - ( 2020 15:27 )   PT: 13.0 sec;   INR: 1.13 ratio         PTT - ( 2020 15:27 )  PTT:29.3 sec  Albumin: Albumin, Serum: 2.0 g/dL ( @ 14:24)      Allergies    aspirin (Other (Severe))    Intolerances      MEDICATIONS  (STANDING):  dextrose 5% 1000 milliLiter(s) (100 mL/Hr) IV Continuous <Continuous>  doxycycline IVPB 100 milliGRAM(s) IV Intermittent every 12 hours  heparin   Injectable 5000 Unit(s) SubCutaneous every 12 hours  piperacillin/tazobactam IVPB.. 3.375 Gram(s) IV Intermittent every 12 hours  potassium chloride  10 mEq/100 mL IVPB 10 milliEquivalent(s) IV Intermittent every 1 hour  QUEtiapine 25 milliGRAM(s) Oral at bedtime    MEDICATIONS  (PRN):  acetaminophen  IVPB .. 1000 milliGRAM(s) IV Intermittent once PRN Mild Pain (1 - 3), Moderate Pain (4 - 6), Severe Pain (7 - 10)  HYDROmorphone  Injectable 0.2 milliGRAM(s) IV Push every 4 hours PRN pain or dyspnea      RADIOLOGY:

## 2020-11-04 NOTE — GOALS OF CARE CONVERSATION - ADVANCED CARE PLANNING - CONVERSATION DETAILS
Called Ms. Reyes's children to review her medical issues and overall wishes for her care. They explained that the pt lives with her son Shar and his family. She is fully dependent for all ADLs, taking care of all day by her niece, who also lives in the home. She has not walked for at least 6 months- falling last year and slowly beginning to refuse to walk or work with visiting PT. The pt often needs 2-person assist, which is possible in their home. The family have a hospital bed, bedside commode, noting that pt wears diapers for incontinence. They also shared that the pt was being seen by [facility] clinicians, who told them her wounds were unlikely to heal, but trying to care for her skin and teach them how to do so at home. They clarified that the pt has had a chronic issue with pruritis as well, seeing an allergist for this and on special cream which they will bring in today. Likewise, they are followed in community by neurologist, knowing she has dementia, and has been trying to help them manage her tendency to be up all night- trying Trazadone and risperidone to no avail. The latter also affected by pt's dec willingness to accept po in the days leading up to admission. They clarified that they had already begun pureeing food at home as pt declined but this too was to no avail prior to this admission. Thus, the family were very open to support in understanding what was happening and with making a plan.     When asked the family knew that the pt has an infection, but thought that it was in her kidneys, not knowing much else besides that. Thus we spent time thoroughly reviewing the pt's medical issues so they would have the information that they needed to make confident decisions about her ultimate plan. Reviewed that the pt came in lethargic and with poor appetite which at her age and considering her advanced dementia, prompted team to proceed with infectious workup revealing sepsis with GNR in blood. Reviewed imaging results and ultimate impression that the pt's infection was likely stemming from her multiple areas of skin breakdown. Reassured them that when there is a specific issue teams often get specialists involved so we do not miss anything- noting that ID and wound care were already on board, and abx seem to be working. Noted also that the pt's skin breakdown is a frequent complication of advanced dementia due to issues with malnutrition. Explained that in hearing about how they care for their mother (as noted above), the pt's malnutrition is not a reflection on them, but happens over time. Went on to note that even if fed appropriately the body has to be able to swallow properly and use what is swallowed effectively to contribute to normal nutritional status- two things that her body is showing it can not do normally. Added that speech and swallow is now involved to comment on this, adjusting diet for safety and also recommending (as do we) against PEG strongly. Likewise noted that pt has FLORA, which is likely due to poor po intake and is improving slowly with IVF. Also noted that certain interventions may not make sense for the pt due to risk such as anticoagulation for her likely chronic DVT to avoid bleeding, especially given the pt was already being treated for anemia with blood transfusions. The family understood and accepted this. However, when considering the possibility of debridement, they would like surgical consult for this.     The family agreed that this was a great deal of information, but they were both grateful for the explanation and now felt like they understood all that the pt is up against. They inquired about where we go from here, to which we noted that this depends on what is most important to them. They shared that they want her to improve and live a good, healthy life. We agreed with this, but also added that living a good life is often inclusive of avoiding interventions that will not benefit the pt, but certainly inc her risk of suffering. As a means of delving further into advanced directives that would keep this agreed upon perspective in mind, we reviewed the MOLST form. They recalled being asked about code status on admission, hesitating to allow DNR order due to misperceptions that this would keep the pt from getting appropriate care. We spent time explaining why this intervention and intubation would not benefit the pt and also reassuring them that a DNR and DNI would not halt further care. In addition, explained that even if they were offered surgery this order is rescinded for the surgical period to give clinicians a chance to intervene if pt codes as a result of meds given in surgery, which they were glad to know. After hearing the reasons why such aggressive interventions (arguably the most aggressive) would not work, would cause more suffering, and are not ultimately contribute positively to the "good life" they described above, they agreed. Likewise, we discussed PEG in the same way, noting that no evidence shows improvement in QOL or length of life for pts with advanced dementia- family then also agreeing with "no feeding tube" option. Otherwise, they were clear that they want everything else done and/or to be considered, including the prospect of debridement.     In discussing the MOLST we were able to discern that it is most important to the family that the pt is able to be at home. They understand that she has advanced dementia, though they struggled at times with qualifications for this, but ultimately agreeing with what they see for themselves. When reviewing the options for dispo that support an effort to keep someone with a chronic progressive illness at the end stages at home with full emphasis on their comfort and ensuring the family is supported in that effort hospice was brought up. They did not know much about hospice and asked for more information. Thus we discussed all levels of hospice care, discerning between intpt and suggested home plan, as well as all services included in this Medicare benefit that the pt and they have a right to. After hearing all that hospice has to offer, how they would help them keep her at home, and all that is noted above, they agreed with placement of a [facility] (closest to them in an emergency) home hospice referral. They agreed with placing this now in preparation for eventual dc plan, so their support system is solidly in place before the pt comes home.     Ultimately, the family understands that the pt is very ill and at the end stages of her disease. While they are able to make reasonable decisions considering this, including placing a DNR and DNI order, they also still want us to do all that we can to help get her over this hump if possible. We have reassured them that we will put their limits in place to be honored by caregivers, while also giving respect to their request to continue on with IV abx, wound care, all specialists, meds for comfort and calmness, and to have surgery call them to review option for debridement. They know pt will be here for more days to get all input from consultants and to stabilize her as best as possible. [facility] home hospice referral will be made in the interim and we will continue to follow for support and sx management. Above d/w Dr. Perez, JOSE LUIS Murillo, and  Olga (who will inform TIM Catalan).    ***  Adjustments to meds discussed: addition of seroquel 25mg qhs; suggested dc'ding dilaudid as moaning is long standing and pt did well with IV Tylenol and to avoid sedation; and family will bring in cream for chronic pruritis so no need to consider Benadryl (which we hoped to avoid to prevent delirium).

## 2020-11-04 NOTE — PROGRESS NOTE ADULT - SUBJECTIVE AND OBJECTIVE BOX
89 y.o. female with PMHx of syncope s/p PPM, CHF with left pleural effusion in the past, asthma, Iron deficiency anemia p/w progressively worsening oral intake (previously would eat pureed food but last 1-2 weeks stopped and wouldn't drink either), became confused, would not talk or respond BIBA from home and found to be in FLORA, sepsis with BL infected hip decubitus st IV Left worse than right based on CT, Failure to thrive, anemia, RLE edema and metabolic encephalopathy.     11/3:  Pt seen.  Awake.  Moaning.  Having wound care assessment.  Trying to bite/grab nurses hand.  Confused.  Nonverbal.    11/4:  Pt seen.  Moaning.  Itching.  Not eating.      Review of system- All 10 systems reviewed and is as per HPI otherwise negative.     Vital Signs Last 24 Hrs  T(C): 36.4 (04 Nov 2020 08:36), Max: 36.7 (03 Nov 2020 15:17)  T(F): 97.5 (04 Nov 2020 08:36), Max: 98 (03 Nov 2020 15:17)  HR: 80 (04 Nov 2020 09:17) (80 - 109)  BP: 99/39 (04 Nov 2020 08:36) (99/39 - 116/52)  BP(mean): --  RR: 18 (04 Nov 2020 08:36) (18 - 18)  SpO2: 92% (04 Nov 2020 08:36) (92% - 94%)    PHYSICAL EXAM:  GENERAL: distress.  moaning.    HEAD:  Atraumatic, Normocephalic  EYES: EOMI, PERRLA  HEENT: dry MM  NECK: Supple, No JVD  NERVOUS SYSTEM: awake.  confused.  dementia.    CHEST/LUNG: Clear to auscultation bilaterally  HEART: Regular rate and rhythm; No murmurs, rubs, or gallops  ABDOMEN: Soft, Nontender, Nondistended; Bowel sounds present  GENITOURINARY- Voiding, no palpable bladder  EXTREMITIES:  extensive edema right leg.    MUSCULOSKELTAL-muscle wasting  SKIN-multiple areas of skin break down on hips/ legs.  Exposed bone on bilateral hips with large ulcerations.          LABS:  11-04    147<H>  |  113<H>  |  63<H>  ----------------------------<  114<H>  3.1<L>   |  26  |  3.01<H>    Ca    8.0<L>      04 Nov 2020 08:35                          10.9   11.12 )-----------( 155      ( 04 Nov 2020 08:35 )             35.7     PT/INR - ( 02 Nov 2020 15:27 )   PT: 13.0 sec;   INR: 1.13 ratio    PTT - ( 02 Nov 2020 15:27 )  PTT:29.3 sec    Culture Results:   Growth in anaerobic bottle: Proteus mirabilis     < from: CT Abdomen and Pelvis No Cont (11.02.20 @ 16:12) >  IMPRESSION:  Deep bilateral hip decubitus ulcers with exposure of the underlying greater trochanters. No definite underlying bony erosion or destruction. Small amount of gas in the left greater trochanteric bursa suggestive of bursitis.  < end of copied text >    MEDICATIONS  (STANDING):  dextrose 5% 1000 milliLiter(s) (100 mL/Hr) IV Continuous <Continuous>  doxycycline IVPB 100 milliGRAM(s) IV Intermittent every 12 hours  heparin   Injectable 5000 Unit(s) SubCutaneous every 12 hours  piperacillin/tazobactam IVPB.. 3.375 Gram(s) IV Intermittent every 12 hours  QUEtiapine 25 milliGRAM(s) Oral at bedtime    MEDICATIONS  (PRN):  acetaminophen  IVPB .. 1000 milliGRAM(s) IV Intermittent once PRN Mild Pain (1 - 3), Moderate Pain (4 - 6), Severe Pain (7 - 10)  HYDROmorphone  Injectable 0.2 milliGRAM(s) IV Push every 4 hours PRN pain or dyspnea

## 2020-11-04 NOTE — PROGRESS NOTE ADULT - SUBJECTIVE AND OBJECTIVE BOX
Date of service: 20 @ 11:20    Lying in bed in NAD  Lethargic; arousable  Confused  Noted with bacteremia    ROS: no fever or chills; poorly verbal  MEDICATIONS  (STANDING):  dextrose 5% 1000 milliLiter(s) (100 mL/Hr) IV Continuous <Continuous>  doxycycline IVPB 100 milliGRAM(s) IV Intermittent every 12 hours  heparin   Injectable 5000 Unit(s) SubCutaneous every 12 hours  piperacillin/tazobactam IVPB.. 3.375 Gram(s) IV Intermittent every 12 hours  potassium chloride  10 mEq/100 mL IVPB 10 milliEquivalent(s) IV Intermittent every 1 hour  QUEtiapine 25 milliGRAM(s) Oral at bedtime    Vital Signs Last 24 Hrs  T(C): 36.4 (2020 08:36), Max: 36.7 (2020 15:17)  T(F): 97.5 (2020 08:36), Max: 98 (2020 15:17)  HR: 80 (2020 09:17) (80 - 109)  BP: 99/39 (2020 08:36) (99/39 - 116/52)  BP(mean): --  RR: 18 (2020 08:36) (18 - 18)  SpO2: 92% (2020 08:36) (92% - 94%)     Physical exam:    Constitutional:  No acute distress  HEENT: NC/AT, EOMI, PERRLA, conjunctivae clear; ears and nose atraumatic; pharynx benign  Neck: supple; thyroid not palpable  Back: no tenderness  Respiratory: respiratory effort normal; clear to auscultation  Cardiovascular: S1S2 regular, no murmurs  Abdomen: soft, not tender, not distended, positive BS; no liver or spleen organomegaly  Genitourinary: no suprapubic tenderness  Lymphatic: no LN palpable  Musculoskeletal: no muscle tenderness, no joint swelling or tenderness  Right hip decubitus ulcer with palpable bone, areas of necrosis, foul smelling; surrounding erythema; scant discharge  Large left hip decubitus ulcer with palpable bone and partial necrotic base  Extremities: no pedal edema  Neurological/ Psychiatric: confusion, judgement and insight impaired; moving all extremities  Skin: no rashes; no palpable lesions    Labs: reviewed                        10.9   11.12 )-----------( 155      ( 2020 08:35 )             35.7     11-04    147<H>  |  113<H>  |  63<H>  ----------------------------<  114<H>  3.1<L>   |  26  |  3.01<H>    Ca    8.0<L>      2020 08:35  Phos  4.7     11-02  Mg     2.8     11-02    TPro  7.0  /  Alb  2.0<L>  /  TBili  0.7  /  DBili  x   /  AST  20  /  ALT  12  /  AlkPhos  105  11-02    Ferritin, Serum: 117 ng/mL (20 @ 21:11)  C-Reactive Protein, Serum: 26.74 mg/dL (20 @ 14:24)                        11.0   15.93 )-----------( 166      ( 2020 08:11 )             36.0     11-03    154<H>  |  117<H>  |  69<H>  ----------------------------<  104<H>  3.9   |  29  |  3.23<H>    Ca    8.1<L>      2020 08:11  Phos  4.7     11-02  Mg     2.8     11-02    TPro  7.0  /  Alb  2.0<L>  /  TBili  0.7  /  DBili  x   /  AST  20  /  ALT  12  /  AlkPhos  105  11-02     LIVER FUNCTIONS - ( 2020 14:24 )  Alb: 2.0 g/dL / Pro: 7.0 gm/dL / ALK PHOS: 105 U/L / ALT: 12 U/L / AST: 20 U/L / GGT: x           Urinalysis Basic - ( 2020 14:24 )    Color: Yellow / Appearance: very cloudy / S.015 / pH: x  Gluc: x / Ketone: Negative  / Bili: Moderate / Urobili: 1 mg/dL   Blood: x / Protein: 30 mg/dL / Nitrite: Negative   Leuk Esterase: Negative / RBC: 3-5 /HPF / WBC 0-2   Sq Epi: x / Non Sq Epi: Moderate / Bacteria: Occasional      Culture - Urine (collected 2020 14:24)  Source: .Urine Clean Catch (Midstream)  Final Report (2020 14:18):    No growth    Culture - Blood (collected 2020 14:24)  Source: .Blood Blood-Peripheral  Preliminary Report (2020 19:01):    No growth to date.    Culture - Blood (collected 2020 14:24)  Source: .Blood Blood-Peripheral  Gram Stain (2020 11:00):    Growth in anaerobic bottle: Gram Negative Rods  Preliminary Report (2020 09:58):    Growth in anaerobic bottle: Proteus mirabilis  Organism: Blood Culture PCR (2020 13:55)      -  Acinetobacter baumanii: Nondet      -  Candida albicans: Nondet      -  Candida glabrata: Nondet      -  Candida krusei: Nondet      -  Candida parapsilosis: Nondet      -  Candida tropicalis: Nondet      -  Coagulase negative Staphylococcus: Nondet      -  Enterobacter cloacae complex: Nondet      -  Enterococcus species: Nondet      -  Escherichia coli: Nondet      -  Haemophilus influenzae: Nondet      -  Klebsiella oxytoca: Nondet      -  Klebsiella pneumoniae: Nondet      -  Listeria monocytogenes: Nondet      -  Methicillin resistant Staphylococcus aureus (MRSA): Nondet      -  Multidrug (KPC pos) resistant organism: Nondet      -  Neisseria meningitidis: Nondet      -  Pseudomonas aeruginosa: Nondet      -  Serratia marcescens: Nondet      -  Staphylococcus aureus: Nondet      -  Streptococcus agalactiae (Group B): Nondet      -  Streptococcus pneumoniae: Nondet      -  Streptococcus pyogenes (Group A): Nondet      -  Streptococcus sp. (Not Grp A, B or S pneumoniae): Nondet      -  Vancomycin resistant Enterococcus sp.: Nondet      Method Type: PCR        COVID-19 PCR: NotDetec (20 @ 15:05)    Radiology: all available radiological tests reviewed    < from: CT Abdomen and Pelvis No Cont (20 @ 16:12) >  Deep bilateral hip decubitus ulcers with exposure of the underlying greater trochanters. No definite underlying bony erosion or destruction. Small amount of gas in the left greater trochanteric bursa suggestive of bursitis.    < end of copied text >      Advanced directives addressed: full resuscitation

## 2020-11-05 LAB
-  AMIKACIN: SIGNIFICANT CHANGE UP
-  AMPICILLIN/SULBACTAM: SIGNIFICANT CHANGE UP
-  AMPICILLIN: SIGNIFICANT CHANGE UP
-  AZTREONAM: SIGNIFICANT CHANGE UP
-  CANDIDA ALBICANS: SIGNIFICANT CHANGE UP
-  CANDIDA GLABRATA: SIGNIFICANT CHANGE UP
-  CANDIDA KRUSEI: SIGNIFICANT CHANGE UP
-  CANDIDA PARAPSILOSIS: SIGNIFICANT CHANGE UP
-  CANDIDA TROPICALIS: SIGNIFICANT CHANGE UP
-  CEFAZOLIN: SIGNIFICANT CHANGE UP
-  CEFEPIME: SIGNIFICANT CHANGE UP
-  CEFOXITIN: SIGNIFICANT CHANGE UP
-  CEFTRIAXONE: SIGNIFICANT CHANGE UP
-  CIPROFLOXACIN: SIGNIFICANT CHANGE UP
-  COAGULASE NEGATIVE STAPHYLOCOCCUS: SIGNIFICANT CHANGE UP
-  ERTAPENEM: SIGNIFICANT CHANGE UP
-  GENTAMICIN: SIGNIFICANT CHANGE UP
-  K. PNEUMONIAE GROUP: SIGNIFICANT CHANGE UP
-  KPC RESISTANCE GENE: SIGNIFICANT CHANGE UP
-  LEVOFLOXACIN: SIGNIFICANT CHANGE UP
-  MEROPENEM: SIGNIFICANT CHANGE UP
-  PIPERACILLIN/TAZOBACTAM: SIGNIFICANT CHANGE UP
-  STREPTOCOCCUS SP. (NOT GRP A, B OR S PNEUMONIAE): SIGNIFICANT CHANGE UP
-  TOBRAMYCIN: SIGNIFICANT CHANGE UP
-  TRIMETHOPRIM/SULFAMETHOXAZOLE: SIGNIFICANT CHANGE UP
A BAUMANNII DNA SPEC QL NAA+PROBE: SIGNIFICANT CHANGE UP
ANION GAP SERPL CALC-SCNC: 7 MMOL/L — SIGNIFICANT CHANGE UP (ref 5–17)
BUN SERPL-MCNC: 50 MG/DL — HIGH (ref 7–23)
CALCIUM SERPL-MCNC: 7.5 MG/DL — LOW (ref 8.5–10.1)
CHLORIDE SERPL-SCNC: 107 MMOL/L — SIGNIFICANT CHANGE UP (ref 96–108)
CO2 SERPL-SCNC: 26 MMOL/L — SIGNIFICANT CHANGE UP (ref 22–31)
CREAT SERPL-MCNC: 2.21 MG/DL — HIGH (ref 0.5–1.3)
CULTURE RESULTS: SIGNIFICANT CHANGE UP
E CLOAC COMP DNA BLD POS QL NAA+PROBE: SIGNIFICANT CHANGE UP
E COLI DNA BLD POS QL NAA+NON-PROBE: SIGNIFICANT CHANGE UP
ENTEROCOC DNA BLD POS QL NAA+NON-PROBE: SIGNIFICANT CHANGE UP
ENTEROCOC DNA BLD POS QL NAA+NON-PROBE: SIGNIFICANT CHANGE UP
GLUCOSE SERPL-MCNC: 108 MG/DL — HIGH (ref 70–99)
GP B STREP DNA BLD POS QL NAA+NON-PROBE: SIGNIFICANT CHANGE UP
GRAM STN FLD: SIGNIFICANT CHANGE UP
HAEM INFLU DNA BLD POS QL NAA+NON-PROBE: SIGNIFICANT CHANGE UP
K OXYTOCA DNA BLD POS QL NAA+NON-PROBE: SIGNIFICANT CHANGE UP
L MONOCYTOG DNA BLD POS QL NAA+NON-PROBE: SIGNIFICANT CHANGE UP
METHOD TYPE: SIGNIFICANT CHANGE UP
METHOD TYPE: SIGNIFICANT CHANGE UP
MRSA SPEC QL CULT: SIGNIFICANT CHANGE UP
MSSA DNA SPEC QL NAA+PROBE: SIGNIFICANT CHANGE UP
N MEN ISLT CULT: SIGNIFICANT CHANGE UP
ORGANISM # SPEC MICROSCOPIC CNT: SIGNIFICANT CHANGE UP
P AERUGINOSA DNA BLD POS NAA+NON-PROBE: SIGNIFICANT CHANGE UP
POTASSIUM SERPL-MCNC: 3.3 MMOL/L — LOW (ref 3.5–5.3)
POTASSIUM SERPL-SCNC: 3.3 MMOL/L — LOW (ref 3.5–5.3)
S MARCESCENS DNA BLD POS NAA+NON-PROBE: SIGNIFICANT CHANGE UP
S PNEUM DNA BLD POS QL NAA+NON-PROBE: SIGNIFICANT CHANGE UP
S PYO DNA BLD POS QL NAA+NON-PROBE: SIGNIFICANT CHANGE UP
SODIUM SERPL-SCNC: 140 MMOL/L — SIGNIFICANT CHANGE UP (ref 135–145)
SPECIMEN SOURCE: SIGNIFICANT CHANGE UP

## 2020-11-05 PROCEDURE — 99233 SBSQ HOSP IP/OBS HIGH 50: CPT

## 2020-11-05 PROCEDURE — 99497 ADVNCD CARE PLAN 30 MIN: CPT | Mod: 25

## 2020-11-05 RX ORDER — POTASSIUM CHLORIDE 20 MEQ
10 PACKET (EA) ORAL
Refills: 0 | Status: COMPLETED | OUTPATIENT
Start: 2020-11-05 | End: 2020-11-05

## 2020-11-05 RX ORDER — DEXTROSE MONOHYDRATE, SODIUM CHLORIDE, AND POTASSIUM CHLORIDE 50; .745; 4.5 G/1000ML; G/1000ML; G/1000ML
1000 INJECTION, SOLUTION INTRAVENOUS
Refills: 0 | Status: DISCONTINUED | OUTPATIENT
Start: 2020-11-05 | End: 2020-11-11

## 2020-11-05 RX ADMIN — HEPARIN SODIUM 5000 UNIT(S): 5000 INJECTION INTRAVENOUS; SUBCUTANEOUS at 21:14

## 2020-11-05 RX ADMIN — PIPERACILLIN AND TAZOBACTAM 25 GRAM(S): 4; .5 INJECTION, POWDER, LYOPHILIZED, FOR SOLUTION INTRAVENOUS at 15:28

## 2020-11-05 RX ADMIN — PIPERACILLIN AND TAZOBACTAM 25 GRAM(S): 4; .5 INJECTION, POWDER, LYOPHILIZED, FOR SOLUTION INTRAVENOUS at 23:46

## 2020-11-05 RX ADMIN — DEXTROSE MONOHYDRATE, SODIUM CHLORIDE, AND POTASSIUM CHLORIDE 75 MILLILITER(S): 50; .745; 4.5 INJECTION, SOLUTION INTRAVENOUS at 11:29

## 2020-11-05 RX ADMIN — Medication 100 MILLIEQUIVALENT(S): at 19:05

## 2020-11-05 RX ADMIN — HEPARIN SODIUM 5000 UNIT(S): 5000 INJECTION INTRAVENOUS; SUBCUTANEOUS at 11:51

## 2020-11-05 RX ADMIN — Medication 100 MILLIEQUIVALENT(S): at 20:50

## 2020-11-05 RX ADMIN — Medication 100 MILLIEQUIVALENT(S): at 11:49

## 2020-11-05 RX ADMIN — Medication 110 MILLIGRAM(S): at 11:48

## 2020-11-05 RX ADMIN — SODIUM CHLORIDE 100 MILLILITER(S): 9 INJECTION, SOLUTION INTRAVENOUS at 02:13

## 2020-11-05 RX ADMIN — Medication 110 MILLIGRAM(S): at 22:21

## 2020-11-05 NOTE — PROGRESS NOTE ADULT - ASSESSMENT
89 y/o female with h/o syncope s/p PPM, CHF with left pleural effusion in the past, asthma, iron deficiency anemia was admitted on 11/2 for poorly healing hip ulcerations and increased confusion. She was noted with progressively worsening oral intake (previously would eat pureed food but last 1-2 weeks PTA, she stopped and wouldn't drink either), increased. confused, would not talk or respond> IN ER she was noted with high creatinine and b/l hip decubitus st IV Left worse than right. She received zosyn IV and vancomycin IV.     1. Low grade fever. Sepsis with PRMI and GP coccobacilli. B/l hip decubitus ulcers. B/l hips acute on chronic OM with surrounding cellulitis. ARF.   -hemodinamically improve  -encephalopathy is persistent  -leukocytosis  -BC x 2, urine c/s noted  -on zosyn 3 gm IV q12h and doxycycline 100 mg IV q12h # 3  -tolerating abx well so far; no side effects noted  -local wound care  -consider surgical evaluation for possible debridement   -renal function is improving  -repeat BC x 2  -f/u cultures and reconsider abx as necessary  -aspiration precautions  -continue abx coverage  -monitor temps  -f/u CBC  -supportive care  2. Other issues:   -care per medicine

## 2020-11-05 NOTE — PROGRESS NOTE ADULT - SUBJECTIVE AND OBJECTIVE BOX
Date of service: 20 @ 14:14    Lying in bed in NAD  Alert and confused  Dose not seem in pain  Fever is down  New bacteremia reported with new bacteria in blood cultures    ROS: no fever or chills; unobtainable    MEDICATIONS  (STANDING):  dextrose 5% + sodium chloride 0.45% with potassium chloride 20 mEq/L 1000 milliLiter(s) (75 mL/Hr) IV Continuous <Continuous>  doxycycline IVPB 100 milliGRAM(s) IV Intermittent every 12 hours  heparin   Injectable 5000 Unit(s) SubCutaneous every 12 hours  piperacillin/tazobactam IVPB.. 3.375 Gram(s) IV Intermittent every 12 hours  potassium chloride  10 mEq/100 mL IVPB 10 milliEquivalent(s) IV Intermittent every 1 hour  QUEtiapine 25 milliGRAM(s) Oral at bedtime    Vital Signs Last 24 Hrs  T(C): 36.5 (2020 23:56), Max: 36.5 (2020 23:56)  T(F): 97.7 (2020 23:56), Max: 97.7 (2020 23:56)  HR: 106 (2020 09:03) (82 - 106)  BP: 123/75 (2020 09:03) (102/59 - 124/64)  BP(mean): --  RR: 18 (2020 23:56) (18 - 18)  SpO2: 92% (2020 23:56) (92% - 94%)     Physical exam:    Constitutional:  No acute distress  HEENT: NC/AT, EOMI, PERRLA, conjunctivae clear; ears and nose atraumatic; pharynx benign  Neck: supple; thyroid not palpable  Back: no tenderness  Respiratory: respiratory effort normal; clear to auscultation  Cardiovascular: S1S2 regular, no murmurs  Abdomen: soft, not tender, not distended, positive BS; no liver or spleen organomegaly  Genitourinary: no suprapubic tenderness  Lymphatic: no LN palpable  Musculoskeletal: no muscle tenderness, no joint swelling or tenderness  Right hip decubitus ulcer with palpable bone, areas of necrosis, foul smelling; surrounding erythema; scant discharge  Large left hip decubitus ulcer with palpable bone and partial necrotic base  Extremities: no pedal edema  Neurological/ Psychiatric: confusion, judgement and insight impaired; moving all extremities  Skin: no rashes; no palpable lesions    Labs: reviewed                        10.9   11.12 )-----------( 155      ( 2020 08:35 )             35.7     11-05    140  |  107  |  50<H>  ----------------------------<  108<H>  3.3<L>   |  26  |  2.21<H>    Ca    7.5<L>      2020 06:41    Ferritin, Serum: 117 ng/mL (20 @ 21:11)  C-Reactive Protein, Serum: 26.74 mg/dL (20 @ 14:24)                        11.0   15.93 )-----------( 166      ( 2020 08:11 )             36.0     11-03    154<H>  |  117<H>  |  69<H>  ----------------------------<  104<H>  3.9   |  29  |  3.23<H>    Ca    8.1<L>      2020 08:11  Phos  4.7     11-  Mg     2.8     11-    TPro  7.0  /  Alb  2.0<L>  /  TBili  0.7  /  DBili  x   /  AST  20  /  ALT  12  /  AlkPhos  105  11-02     LIVER FUNCTIONS - ( 2020 14:24 )  Alb: 2.0 g/dL / Pro: 7.0 gm/dL / ALK PHOS: 105 U/L / ALT: 12 U/L / AST: 20 U/L / GGT: x           Urinalysis Basic - ( 2020 14:24 )    Color: Yellow / Appearance: very cloudy / S.015 / pH: x  Gluc: x / Ketone: Negative  / Bili: Moderate / Urobili: 1 mg/dL   Blood: x / Protein: 30 mg/dL / Nitrite: Negative   Leuk Esterase: Negative / RBC: 3-5 /HPF / WBC 0-2   Sq Epi: x / Non Sq Epi: Moderate / Bacteria: Occasional      Culture - Urine (collected 2020 14:24)  Source: .Urine Clean Catch (Midstream)  Final Report (2020 14:18):    No growth    Culture - Blood (collected 2020 14:24)  Source: .Blood Blood-Peripheral  Gram Stain (2020 06:11):    Growth in anaerobic bottle: gram positive coccobacilli  Preliminary Report (2020 06:12):    Growth in anaerobic bottle: gram positive coccobacilli    "Due to technical problems, Proteus sp. will Not be reported as part of    the BCID panel until further notice"    ***Blood Panel PCR results on this specimen are available    approximately 3 hours after the Gram stain result.***    Gram stain, PCR, and/or culture results may not always    correspond due to difference in methodologies.    ************************************************************    This PCR assay was performed using [employer].    The following targets are tested for: Enterococcus,    vancomycin resistant enterococci, Listeria monocytogenes,    coagulase negative staphylococci, S. aureus,    methicillin resistant S. aureus, Streptococcus agalactiae    (Group B), S. pneumoniae, S. pyogenes (Group A),    Acinetobacter baumannii, Enterobacter cloacae, E. coli,    Klebsiella oxytoca, K. pneumoniae, Proteus sp.,    Serratia marcescens, Haemophilus influenzae,    Neisseria meningitidis, Pseudomonas aeruginosa, Candida    albicans, C. glabrata, C krusei, C parapsilosis,    C. tropicalis and the KPC resistance gene.  Organism: Blood Culture PCR (2020 07:18)      Culture - Blood (collected 2020 14:24)  Source: .Blood Blood-Peripheral  Gram Stain (2020 11:00):    Growth in anaerobic bottle: Gram Negative Rods  Final Report (2020 09:12):    Growth in anaerobic bottle: Proteus mirabilis  Organism: Blood Culture PCR  Proteus mirabilis (2020 09:12)  Organism: Proteus mirabilis (2020 09:12)      -  Amikacin: S <=16      -  Ampicillin: R >16 These ampicillin results predict results for amoxicillin      -  Ampicillin/Sulbactam: S <=4/2 Enterobacter, Citrobacter, and Serratia may develop resistance during prolonged therapy (3-4 days)      -  Aztreonam: S <=4      -  Cefazolin: R >16 Enterobacter, Citrobacter, and Serratia may develop resistance during prolonged therapy (3-4 days)      -  Cefepime: S <=2      -  Cefoxitin: S <=8      -  Ceftriaxone: S <=1 Enterobacter, Citrobacter, and Serratia may develop resistance during prolonged therapy      -  Ciprofloxacin: S <=0.25      -  Ertapenem: S <=0.5      -  Gentamicin: S <=2      -  Levofloxacin: S <=0.5      -  Meropenem: S <=1      -  Piperacillin/Tazobactam: S <=8      -  Tobramycin: I 8      -  Trimethoprim/Sulfamethoxazole: R >      Method Type: MELLY  Organism: Blood Culture PCR (2020 09:12)      COVID-19 PCR: NotDetec (20 @ 15:05)    Radiology: all available radiological tests reviewed    < from: CT Abdomen and Pelvis No Cont (20 @ 16:12) >  Deep bilateral hip decubitus ulcers with exposure of the underlying greater trochanters. No definite underlying bony erosion or destruction. Small amount of gas in the left greater trochanteric bursa suggestive of bursitis.    < end of copied text >      Advanced directives addressed: full resuscitation

## 2020-11-05 NOTE — PROVIDER CONTACT NOTE (CRITICAL VALUE NOTIFICATION) - BACKGROUND
nov 2 anerobic bottle gram positive coccobaccili
patient presented with bilateral hip unstageable decubitus
patient currently on doxycycline and zosyn

## 2020-11-05 NOTE — PROGRESS NOTE ADULT - SUBJECTIVE AND OBJECTIVE BOX
HPI: Pt seen and examined this am in follow up for sx. Pt rouses for a short while when name is called, able to say good morning. Unable to gather further hx 2/2 to dementia, but appears comfortable.       PAIN: no nonverbal signs of pain  DYSPNEA: no nonverbal signs of dyspnea      ROS:  Unable to gather 2/2 to dementia    PHYSICAL EXAM:    Vital Signs Last 24 Hrs  T(C): 36.5 (2020 23:56), Max: 36.5 (2020 23:56)  T(F): 97.7 (2020 23:56), Max: 97.7 (2020 23:56)  HR: 106 (2020 09:03) (82 - 106)  BP: 123/75 (2020 09:03) (102/59 - 124/64)  RR: 18 (2020 23:56) (18 - 18)  SpO2: 92% (2020 23:56) (92% - 94%)  Daily Weight in k (2020 06:18)    PPSV2:  20-30 %  FAST: 7c    General: Elderly female, cachectic, ill-appearing, scratching skin at times but mittens on now  Mental Status: AOx1 (responds to her name, otherwise unable to gather)  HEENT: dmm, + temporal wasting  Lungs: clear  Cardiac: + s1 s2 rrr  GI: soft nt nd + bs  : incontinent  MSK/skin: contracted, sitting on legs, excoriations on limbs,  multiple areas of skin breakdown over bl hips, R knee, muscle and fat wasting throughout  Neuro: limited by confusion    LABS:                        10.9   11.12 )-----------( 155      ( 2020 08:35 )             35.7     11-05    140  |  107  |  50<H>  ----------------------------<  108<H>  3.3<L>   |  26  |  2.21<H>    Ca    7.5<L>      2020 06:41        Albumin: Albumin, Serum: 2.0 g/dL ( @ 14:24)      Allergies    aspirin (Other (Severe))    Intolerances      MEDICATIONS  (STANDING):  dextrose 5% + sodium chloride 0.45% with potassium chloride 20 mEq/L 1000 milliLiter(s) (75 mL/Hr) IV Continuous <Continuous>  doxycycline IVPB 100 milliGRAM(s) IV Intermittent every 12 hours  heparin   Injectable 5000 Unit(s) SubCutaneous every 12 hours  piperacillin/tazobactam IVPB.. 3.375 Gram(s) IV Intermittent every 12 hours  potassium chloride  10 mEq/100 mL IVPB 10 milliEquivalent(s) IV Intermittent every 1 hour  QUEtiapine 25 milliGRAM(s) Oral at bedtime    MEDICATIONS  (PRN):  acetaminophen  IVPB .. 1000 milliGRAM(s) IV Intermittent once PRN Mild Pain (1 - 3), Moderate Pain (4 - 6), Severe Pain (7 - 10)  acetaminophen  IVPB .. 1000 milliGRAM(s) IV Intermittent once PRN Mild Pain (1 - 3), Moderate Pain (4 - 6), Severe Pain (7 - 10)  acetaminophen  IVPB .. 1000 milliGRAM(s) IV Intermittent once PRN Mild Pain (1 - 3), Moderate Pain (4 - 6), Severe Pain (7 - 10)      RADIOLOGY:

## 2020-11-05 NOTE — PROGRESS NOTE ADULT - SUBJECTIVE AND OBJECTIVE BOX
89 y.o. female with PMHx of syncope s/p PPM, CHF with left pleural effusion in the past, asthma, Iron deficiency anemia p/w progressively worsening oral intake (previously would eat pureed food but last 1-2 weeks stopped and wouldn't drink either), became confused, would not talk or respond BIBA from home and found to be in FLORA, sepsis with BL infected hip decubitus st IV Left worse than right based on CT, Failure to thrive, anemia, RLE edema and metabolic encephalopathy.     11/3:  Pt seen.  Awake.  Moaning.  Having wound care assessment.  Trying to bite/grab nurses hand.  Confused.  Nonverbal.    11/4:  Pt seen.  Moaning.  Itching.  Not eating.    11/5:  Pt seen.  Awake.  Itching.  Moaning.      Review of system- All 10 systems reviewed and is as per HPI otherwise negative.     Vital Signs Last 24 Hrs  T(C): 36.2 (05 Nov 2020 16:25), Max: 36.5 (04 Nov 2020 23:56)  T(F): 97.2 (05 Nov 2020 16:25), Max: 97.7 (04 Nov 2020 23:56)  HR: 104 (05 Nov 2020 16:25) (82 - 106)  BP: 151/77 (05 Nov 2020 16:25) (102/59 - 151/77)  BP(mean): --  RR: 18 (05 Nov 2020 16:25) (18 - 18)  SpO2: 100% (05 Nov 2020 16:25) (92% - 100%)    PHYSICAL EXAM:  GENERAL: distress.  moaning.    HEAD:  Atraumatic, Normocephalic  EYES: EOMI, PERRLA  HEENT: dry MM  NECK: Supple, No JVD  NERVOUS SYSTEM: awake.  confused.  dementia.    CHEST/LUNG: Clear to auscultation bilaterally  HEART: Regular rate and rhythm; No murmurs, rubs, or gallops  ABDOMEN: Soft, Nontender, Nondistended; Bowel sounds present  GENITOURINARY- Voiding, no palpable bladder  EXTREMITIES:  extensive edema right leg.    MUSCULOSKELTAL-muscle wasting  SKIN-multiple areas of skin break down on hips/ legs.  Exposed bone on bilateral hips with large ulcerations.      11-05    140  |  107  |  50<H>  ----------------------------<  108<H>  3.3<L>   |  26  |  2.21<H>    Ca    7.5<L>      05 Nov 2020 06:41                              10.9   11.12 )-----------( 155      ( 04 Nov 2020 08:35 )             35.7     Culture Results:   Growth in anaerobic bottle: gram positive coccobacilli Culture Results:   Growth in anaerobic bottle: Proteus mirabilis     < from: CT Abdomen and Pelvis No Cont (11.02.20 @ 16:12) >  IMPRESSION:  Deep bilateral hip decubitus ulcers with exposure of the underlying greater trochanters. No definite underlying bony erosion or destruction. Small amount of gas in the left greater trochanteric bursa suggestive of bursitis.  < end of copied text >      MEDICATIONS  (STANDING):  dextrose 5% + sodium chloride 0.45% with potassium chloride 20 mEq/L 1000 milliLiter(s) (75 mL/Hr) IV Continuous <Continuous>  doxycycline IVPB 100 milliGRAM(s) IV Intermittent every 12 hours  heparin   Injectable 5000 Unit(s) SubCutaneous every 12 hours  piperacillin/tazobactam IVPB.. 3.375 Gram(s) IV Intermittent every 12 hours  potassium chloride  10 mEq/100 mL IVPB 10 milliEquivalent(s) IV Intermittent every 1 hour  QUEtiapine 25 milliGRAM(s) Oral at bedtime    MEDICATIONS  (PRN):  acetaminophen  IVPB .. 1000 milliGRAM(s) IV Intermittent once PRN Mild Pain (1 - 3), Moderate Pain (4 - 6), Severe Pain (7 - 10)  acetaminophen  IVPB .. 1000 milliGRAM(s) IV Intermittent once PRN Mild Pain (1 - 3), Moderate Pain (4 - 6), Severe Pain (7 - 10)  acetaminophen  IVPB .. 1000 milliGRAM(s) IV Intermittent once PRN Mild Pain (1 - 3), Moderate Pain (4 - 6), Severe Pain (7 - 10)

## 2020-11-05 NOTE — PROGRESS NOTE ADULT - ASSESSMENT
89 y.o. female with PMHx of syncope s/p PPM, CHF with left pleural effusion in the past, asthma, Iron deficiency anemia p/w progressively worsening oral intake (previously would eat pureed food but last 1-2 weeks stopped and wouldn't drink either), became confused, would not talk or respond BIBA from home and found to be in FLORA, sepsis with BL infected hip decubitus st IV Left worse than right based on CT, Failure to thrive, anemia, RLE edema and metabolic encephalopathy.    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    Hemodynamics improving.      Urine cx negative.  CXR clear.    Appreciate ID input.    Cont Vanco/ Doxy for now.  Day #3.    F/u repeat cultures for clearance.    Awaiting plastics eval re:  extensive decubitus ulcerations.  ? Debridement.    Appreciate palliative input.  Family wishes to proceed with further wound debridement if indicated and continued wound care.  Unsure about hospice at this time.      Pain control.      #FLORA:    Likely due to ATN with sepsis and decreased po intake.    Trend with labs.    Cont IVF.      #Hypernatremia:    Na improved to 140.  Change IVF to D5 1/2 with KCL.    Repeat labs in am.      #Chronic Anemia with Hx of KEHINDE:    Hgb improved from 7 to 11.    Hemoccult stools negative.  No obvious acute bleeding.      #FTT due to advanced age/ dementia:    Nutrition eval.      #Metabolic encephalopathy:    Secondary to sepsis with underlying dementia.      #Right LE DVT:    Extensive occlusive clot of Right femoral vein through right posterial tibial vein.    AC was not started due to anemia-- tried to call family to discuss.  Await call back.    Will hold off on full AC for now as presumed more chronic and patient is functional quadriplegic due to FTT/ dementia.      #Functional quadriplegia due to advanced dementia    #Dysphagia:    Dysphagia 1 with nectar liquids.  Speech f/u.      #DVT Proph:  Heparin SQ.      CODE status/ Goals of Care:  DNR/ DNI/ No feeding tube.  Appreciate palliative input.      Advised Shar (11/5) that decubitus ulcerations will never heal and she will be at risk for recurrent sepsis and readmissions due to this.  Family undecided about hospice.  Time spent discussing goals of care:  20 min.      Surrogates-- Eric:  [phone]/ Shar:  [phone]

## 2020-11-05 NOTE — PROGRESS NOTE ADULT - ASSESSMENT
89y old Female coming from home with hx of Dementia (FAST 7c), syncope s/p PPM, CHF with left pleural effusion in the past, asthma, Iron deficiency anemia, admitted 11/2 for progressively worsening oral intake (previously would eat pureed food but last 1-2 weeks stopped and wouldn't drink either), increasing confusion, and decreased responsiveness. Found here to have leukocytosis (sepsis, with elevated lactate 4.3), FLORA (Cr>3), negative CXR, but CT abd/pelvis showing deep bl hip decubiti. Labs later showing GNR in blood cx. Palliative Care consulted to assist with establishing GOC.     1) Pain  - pt moaning at times  - added few doses of IV Tylenol to try, also in hopes of avoiding any negative effects of already fragile mentation  - dc'd IV dilaudid as moaning is a chronic issue as per family and pt doing just fine with IV Tylenol. We would thus prefer to keep pain regimen conservative so as not to compromise already tenuous mental state  - if able to swallow will transition to po regimen    2) Pruritis  - as per family this is a chronic issue, being followed outpt by allergist. Family bringing in creme that works for pt at home  - predates opioid admin  - agree with conservative measures including mittens    3) Sepsis: Decubitus   - bl decubitus ulcers on hips and other areas of skin breakdown  - lactate improved with IVF and abx  - blood cx with GNR result as of today  - ID notes appreciated- suggesting IV abx and inquiry into possible surgical debridement  - wound care notes also appreciated- documenting several areas of skin breakdown including bl stage 4 pressure injuries, R knee stage 3, sacral stage 3, all with poor chance of healing due to nutritional status and comorbidities. Also suggesting surgical consult for possible debridement  - family would be interested in surgical debridement    4) FLORA  - likely due to prerenal cause (poor intake) +/- sepsis  - improving slowly with IVF  - avoid nephrotoxic agents as able  - US negative for hydro    5) DVT  - R extensive DVT likely to be chronic  - according to notes, considering age, comorbidities and anemia, holding off on anticoagulation due to risk    6) Anemia  - chronic iron deficiency  - s/p 2 U PRBCs with appropriate improvement    7) AMS/Dementia  - underlying hx of dementia, with superimposed hypoactive delirium from acute metabolic derangements  - fall and aspiration precautions  - daughter suggested hx of poor sleep and calling out  - able to swallow after swallow eval thus added Seroquel 25 qhs; if unable to swallow can give low dose ativan 0.25 mg IV q6h prn    8) Debility  - PPS<40%  - dependent for ADLs  - nutrition notes appreciated- severe muscle and fat wasting, severe protein calorie malnutrition  - as per RN, awaiting speech and swallow eval    9) Prognosis  - poor  - in setting of advanced dementia, advanced age, dependence for ADLs, FTT, multiple areas of skin breakdown, severe protein calorie malnutrition, PPS<40%, albumin 2.0, pt would fit criteria for hospice. However, unclear if family would be ready for that    10) GOC/Advanced Directives  - pt does not have capacity for decision making  - no HCP on file (what is in [employer] is an incomplete form and family confirms no official proxy): thus children serving as surrogate decision makers: Eric Barrera [phone]  - [facility] form completed 11/4- DNR, limited, DNI, do not send, no feeding tube, trial of IVF, use abx  - GOC conversation held 11/4- plan for all interventions including possible surgery to get pt well while here, but abovementioned limits now in place, and ultimate plan for home hospice after pt fully stabilized. See GOC note for additional details.     Thank you for including us in Ms. Salazar's care. Will continue to follow with you.    Esvin Huston MD  Palliative Care Attending

## 2020-11-06 LAB
ANION GAP SERPL CALC-SCNC: 6 MMOL/L — SIGNIFICANT CHANGE UP (ref 5–17)
BUN SERPL-MCNC: 38 MG/DL — HIGH (ref 7–23)
CALCIUM SERPL-MCNC: 7.5 MG/DL — LOW (ref 8.5–10.1)
CHLORIDE SERPL-SCNC: 108 MMOL/L — SIGNIFICANT CHANGE UP (ref 96–108)
CO2 SERPL-SCNC: 23 MMOL/L — SIGNIFICANT CHANGE UP (ref 22–31)
CREAT SERPL-MCNC: 1.76 MG/DL — HIGH (ref 0.5–1.3)
GLUCOSE SERPL-MCNC: 93 MG/DL — SIGNIFICANT CHANGE UP (ref 70–99)
HCT VFR BLD CALC: 31.9 % — LOW (ref 34.5–45)
HGB BLD-MCNC: 9.9 G/DL — LOW (ref 11.5–15.5)
MCHC RBC-ENTMCNC: 24 PG — LOW (ref 27–34)
MCHC RBC-ENTMCNC: 31 GM/DL — LOW (ref 32–36)
MCV RBC AUTO: 77.4 FL — LOW (ref 80–100)
PLATELET # BLD AUTO: 165 K/UL — SIGNIFICANT CHANGE UP (ref 150–400)
POTASSIUM SERPL-MCNC: 3.8 MMOL/L — SIGNIFICANT CHANGE UP (ref 3.5–5.3)
POTASSIUM SERPL-SCNC: 3.8 MMOL/L — SIGNIFICANT CHANGE UP (ref 3.5–5.3)
RBC # BLD: 4.12 M/UL — SIGNIFICANT CHANGE UP (ref 3.8–5.2)
RBC # FLD: 18.6 % — HIGH (ref 10.3–14.5)
SODIUM SERPL-SCNC: 137 MMOL/L — SIGNIFICANT CHANGE UP (ref 135–145)
WBC # BLD: 8.8 K/UL — SIGNIFICANT CHANGE UP (ref 3.8–10.5)
WBC # FLD AUTO: 8.8 K/UL — SIGNIFICANT CHANGE UP (ref 3.8–10.5)

## 2020-11-06 PROCEDURE — 99232 SBSQ HOSP IP/OBS MODERATE 35: CPT

## 2020-11-06 PROCEDURE — 99233 SBSQ HOSP IP/OBS HIGH 50: CPT

## 2020-11-06 RX ORDER — METOPROLOL TARTRATE 50 MG
25 TABLET ORAL DAILY
Refills: 0 | Status: DISCONTINUED | OUTPATIENT
Start: 2020-11-06 | End: 2020-11-11

## 2020-11-06 RX ADMIN — PIPERACILLIN AND TAZOBACTAM 25 GRAM(S): 4; .5 INJECTION, POWDER, LYOPHILIZED, FOR SOLUTION INTRAVENOUS at 23:47

## 2020-11-06 RX ADMIN — Medication 110 MILLIGRAM(S): at 22:22

## 2020-11-06 RX ADMIN — HEPARIN SODIUM 5000 UNIT(S): 5000 INJECTION INTRAVENOUS; SUBCUTANEOUS at 22:22

## 2020-11-06 RX ADMIN — Medication 110 MILLIGRAM(S): at 10:25

## 2020-11-06 RX ADMIN — HEPARIN SODIUM 5000 UNIT(S): 5000 INJECTION INTRAVENOUS; SUBCUTANEOUS at 10:31

## 2020-11-06 RX ADMIN — PIPERACILLIN AND TAZOBACTAM 25 GRAM(S): 4; .5 INJECTION, POWDER, LYOPHILIZED, FOR SOLUTION INTRAVENOUS at 11:02

## 2020-11-06 NOTE — CONSULT NOTE ADULT - SUBJECTIVE AND OBJECTIVE BOX
S  Pt is a 89 year old female with PMH of dementian CHF, renal insufficiency, iron deficient anemia and R chronic DVT who was admitted for failure to thrive.  Pt stopped oral intake apporiximately 2 weeks ago  She was noted to have several pressure sores.  Pt is being followed by ID and palliative care has been suggested     GNR's in blood culture  Albumin 2.0  BUN/Creatinine is elevated though improving    Afebrile VSS  Pt appears malnourished with multiple joint contractures  Pt is non-communicative    R hip-Stage III 5x5cm Pressure sore-No necrosis noted-no malodor-clean base of granulation tissue  R lateral ankle- small stage II pressure sore  R lateral knee-stage II pressure sore  R LE venous insufficiency  L hip-Stage IV 5 x 5cm pressure sore  No purulence or malodor  No erythema, crepitus, or induration                        exudate over surface    A & P  Pt with malnutrition and multiple contracted limbs and decubitus ulcers            Suggest saline/betadine wet to dry dressing changes of left hip BID            Nutrition consultation            No surgical intervention indicated    Thank-you  Dr Melania Perry

## 2020-11-06 NOTE — PROGRESS NOTE ADULT - ASSESSMENT
89 y.o. female with PMHx of syncope s/p PPM, CHF with left pleural effusion in the past, asthma, Iron deficiency anemia p/w progressively worsening oral intake (previously would eat pureed food but last 1-2 weeks stopped and wouldn't drink either), became confused, would not talk or respond BIBA from home and found to be in FLORA, sepsis with BL infected hip decubitus st IV Left worse than right based on CT, Failure to thrive, anemia, RLE edema and metabolic encephalopathy.    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    Hemodynamics improved.     Appreciate ID input.    Cont Vanco/ Doxy for now.  Day #4.    F/u repeat cultures for clearance.    Awaiting plastics eval re:  extensive decubitus ulcerations.  ? Debridement.  (Called on 11/3, 11/4, and 11/5 to on call docs).    Appreciate palliative input.  Family wishes to proceed with further wound debridement if indicated and continued wound care.  Possible hospice at discharge.      #FLORA:    Likely due to ATN with sepsis and decreased po intake.    Trend with labs.    Cont IVF.      #Hypernatremia:    Resolved.  Continue IVF.  No oral intake.    Repeat labs in am.      #Chronic Anemia:    Hgb improved from 7 to 11.    Hemoccult stools negative.  No obvious acute bleeding.      #FTT due to advanced age/ dementia:    No oral intake to date.  Continue to reassess.  No feeding tube as per MOLST.      #Metabolic encephalopathy:    Secondary to sepsis with underlying dementia.      #Right LE DVT:    Extensive occlusive clot of Right femoral vein through right posterial tibial vein.    AC was not started due to anemia-- tried to call family to discuss.  Await call back.    Will hold off on full AC for now as presumed more chronic and patient is functional quadriplegic due to FTT/ dementia.      #Functional quadriplegia due to advanced dementia    #Dysphagia:    Dysphagia 1 with nectar liquids.  Speech f/u.      #DVT Proph:  Heparin SQ.      CODE status/ Goals of Care:  DNR/ DNI/ No feeding tube.  Appreciate palliative input.      Advised Shar (11/5) that her decubitus ulcerations will never heal and she will be at risk for recurrent sepsis not to mention ongoing dehydration without eating/ drinking and readmissions due to this.    Continued palliative f/u and goals of care.    Surrogates-- Eric:  [phone]/ Shar:  [phone]

## 2020-11-06 NOTE — PROGRESS NOTE ADULT - SUBJECTIVE AND OBJECTIVE BOX
Date of service: 20 @ 12:44    Lying in bed in NAD  Weak looking  Lethargic    ROS: no fever or chills; nonverbal    MEDICATIONS  (STANDING):  dextrose 5% + sodium chloride 0.45% with potassium chloride 20 mEq/L 1000 milliLiter(s) (75 mL/Hr) IV Continuous <Continuous>  doxycycline IVPB 100 milliGRAM(s) IV Intermittent every 12 hours  heparin   Injectable 5000 Unit(s) SubCutaneous every 12 hours  metoprolol succinate ER 25 milliGRAM(s) Oral daily  piperacillin/tazobactam IVPB.. 3.375 Gram(s) IV Intermittent every 12 hours  QUEtiapine 25 milliGRAM(s) Oral at bedtime    Vital Signs Last 24 Hrs  T(C): 36.8 (2020 04:32), Max: 36.8 (2020 04:32)  T(F): 98.3 (2020 04:32), Max: 98.3 (2020 04:32)  HR: 99 (2020 09:00) (99 - 105)  BP: 134/67 (2020 09:00) (134/67 - 151/77)  BP(mean): --  RR: 18 (2020 04:32) (18 - 18)  SpO2: 96% (2020 09:00) (96% - 100%)     Physical exam:    Constitutional:  No acute distress  HEENT: NC/AT, EOMI, PERRLA, conjunctivae clear; ears and nose atraumatic; pharynx benign  Neck: supple; thyroid not palpable  Back: no tenderness  Respiratory: respiratory effort normal; clear to auscultation  Cardiovascular: S1S2 regular, no murmurs  Abdomen: soft, not tender, not distended, positive BS; no liver or spleen organomegaly  Genitourinary: no suprapubic tenderness  Lymphatic: no LN palpable  Musculoskeletal: no muscle tenderness, no joint swelling or tenderness  Right hip decubitus ulcer with palpable bone, areas of necrosis, surrounding erythema; scant discharge  Large left hip decubitus ulcer with palpable bone and partial necrotic base  Extremities: 1+ pedal edema  Neurological/ Psychiatric: confusion, judgement and insight impaired; moving all extremities  Skin: no rashes; no palpable lesions    Labs: reviewed                        9.9    8.80  )-----------( 165      ( 2020 06:44 )             31.9     11-06    137  |  108  |  38<H>  ----------------------------<  93  3.8   |  23  |  1.76<H>    Ca    7.5<L>      2020 06:44    Ferritin, Serum: 117 ng/mL (20 @ 21:11)  C-Reactive Protein, Serum: 26.74 mg/dL (20 @ 14:24)                        10.9   11.12 )-----------( 155      ( 2020 08:35 )             35.7     11-05    140  |  107  |  50<H>  ----------------------------<  108<H>  3.3<L>   |  26  |  2.21<H>    Ca    7.5<L>      2020 06:41    Ferritin, Serum: 117 ng/mL (20 @ 21:11)  C-Reactive Protein, Serum: 26.74 mg/dL (20 @ 14:24)                        11.0   15.93 )-----------( 166      ( 2020 08:11 )             36.0     11-    154<H>  |  117<H>  |  69<H>  ----------------------------<  104<H>  3.9   |  29  |  3.23<H>    Ca    8.1<L>      2020 08:11  Phos  4.7       Mg     2.8         TPro  7.0  /  Alb  2.0<L>  /  TBili  0.7  /  DBili  x   /  AST  20  /  ALT  12  /  AlkPhos  105       LIVER FUNCTIONS - ( 2020 14:24 )  Alb: 2.0 g/dL / Pro: 7.0 gm/dL / ALK PHOS: 105 U/L / ALT: 12 U/L / AST: 20 U/L / GGT: x           Urinalysis Basic - ( 2020 14:24 )    Color: Yellow / Appearance: very cloudy / S.015 / pH: x  Gluc: x / Ketone: Negative  / Bili: Moderate / Urobili: 1 mg/dL   Blood: x / Protein: 30 mg/dL / Nitrite: Negative   Leuk Esterase: Negative / RBC: 3-5 /HPF / WBC 0-2   Sq Epi: x / Non Sq Epi: Moderate / Bacteria: Occasional      Culture - Urine (collected 2020 14:24)  Source: .Urine Clean Catch (Midstream)  Final Report (2020 14:18):    No growth    Culture - Blood (collected 2020 14:24)  Source: .Blood Blood-Peripheral  Gram Stain (2020 06:11):    Growth in anaerobic bottle: gram positive coccobacilli  Preliminary Report (2020 06:12):    Growth in anaerobic bottle: gram positive coccobacilli    "Due to technical problems, Proteus sp. will Not be reported as part of    the BCID panel until further notice"    ***Blood Panel PCR results on this specimen are available    approximately 3 hours after the Gram stain result.***    Gram stain, PCR, and/or culture results may not always    correspond due to difference in methodologies.    ************************************************************    This PCR assay was performed using [employer].    The following targets are tested for: Enterococcus,    vancomycin resistant enterococci, Listeria monocytogenes,    coagulase negative staphylococci, S. aureus,    methicillin resistant S. aureus, Streptococcus agalactiae    (Group B), S. pneumoniae, S. pyogenes (Group A),    Acinetobacter baumannii, Enterobacter cloacae, E. coli,    Klebsiella oxytoca, K. pneumoniae, Proteus sp.,    Serratia marcescens, Haemophilus influenzae,    Neisseria meningitidis, Pseudomonas aeruginosa, Candida    albicans, C. glabrata, C krusei, C parapsilosis,    C. tropicalis and the KPC resistance gene.  Organism: Blood Culture PCR (2020 07:18)      Culture - Blood (collected 2020 14:24)  Source: .Blood Blood-Peripheral  Gram Stain (2020 11:00):    Growth in anaerobic bottle: Gram Negative Rods  Final Report (2020 09:12):    Growth in anaerobic bottle: Proteus mirabilis  Organism: Blood Culture PCR  Proteus mirabilis (2020 09:12)  Organism: Proteus mirabilis (2020 09:12)      -  Amikacin: S <=16      -  Ampicillin: R >16 These ampicillin results predict results for amoxicillin      -  Ampicillin/Sulbactam: S <=4/2 Enterobacter, Citrobacter, and Serratia may develop resistance during prolonged therapy (3-4 days)      -  Aztreonam: S <=4      -  Cefazolin: R >16 Enterobacter, Citrobacter, and Serratia may develop resistance during prolonged therapy (3-4 days)      -  Cefepime: S <=2      -  Cefoxitin: S <=8      -  Ceftriaxone: S <=1 Enterobacter, Citrobacter, and Serratia may develop resistance during prolonged therapy      -  Ciprofloxacin: S <=0.25      -  Ertapenem: S <=0.5      -  Gentamicin: S <=2      -  Levofloxacin: S <=0.5      -  Meropenem: S <=1      -  Piperacillin/Tazobactam: S <=8      -  Tobramycin: I 8      -  Trimethoprim/Sulfamethoxazole: R >2/38      Method Type: MELLY  Organism: Blood Culture PCR (2020 09:12)      COVID-19 PCR: NotDetec (20 @ 15:05)    Radiology: all available radiological tests reviewed    < from: CT Abdomen and Pelvis No Cont (20 @ 16:12) >  Deep bilateral hip decubitus ulcers with exposure of the underlying greater trochanters. No definite underlying bony erosion or destruction. Small amount of gas in the left greater trochanteric bursa suggestive of bursitis.    < end of copied text >      Advanced directives addressed: full resuscitation

## 2020-11-06 NOTE — PROGRESS NOTE ADULT - ASSESSMENT
89 y/o female with h/o syncope s/p PPM, CHF with left pleural effusion in the past, asthma, iron deficiency anemia was admitted on 11/2 for poorly healing hip ulcerations and increased confusion. She was noted with progressively worsening oral intake (previously would eat pureed food but last 1-2 weeks PTA, she stopped and wouldn't drink either), increased. confused, would not talk or respond> IN ER she was noted with high creatinine and b/l hip decubitus st IV Left worse than right. She received zosyn IV and vancomycin IV.     1. Low grade fever improving. Sepsis with PRMI and GP coccobacilli. B/l hip decubitus ulcers. B/l hips acute on chronic OM with surrounding cellulitis. ARF.   -encephalopathy is persistent  -noted with two different organisms in blood culture  -leukocytosis  -BC x 2, urine c/s noted  -on zosyn 3 gm IV q12h and doxycycline 100 mg IV q12h # 4  -tolerating abx well so far; no side effects noted  -local wound care  -consider surgical evaluation for possible debridement   -renal function is improving  -repeat BC x 2  -f/u cultures and reconsider abx as necessary  -aspiration precautions  -continue abx coverage  -monitor temps  -f/u CBC  -supportive care  2. Other issues:   -care per medicine

## 2020-11-06 NOTE — PROGRESS NOTE ADULT - SUBJECTIVE AND OBJECTIVE BOX
HPI: Pt seen and examined this am in follow up for sx. Pt mildly restless this am, but calms with touch and allows exam.       PAIN: no nonverbal signs of pain    DYSPNEA: no nonverbal signs of dyspnea      ROS:  Unable to gather 2/2 to dementia    PHYSICAL EXAM:    Vital Signs Last 24 Hrs  T(C): 36.8 (2020 04:32), Max: 36.8 (2020 04:32)  T(F): 98.3 (2020 04:32), Max: 98.3 (2020 04:32)  HR: 99 (2020 09:00) (99 - 105)  BP: 134/67 (2020 09:00) (134/67 - 151/77)  RR: 18 (2020 04:32) (18 - 18)  SpO2: 96% (2020 09:00) (96% - 100%)  Daily Weight in k.4 (2020 05:55)      PPSV2:  20-30 %  FAST: 7c    General: Elderly female, cachectic, ill-appearing, scratching skin at times but mittens on now  Mental Status: AOx1 (responds to her name, otherwise unable to gather)  HEENT: dmm, + temporal wasting  Lungs: clear  Cardiac: + s1 s2 rrr  GI: soft nt nd + bs  : incontinent  MSK/skin: contracted, sitting on legs, excoriations on limbs,  multiple areas of skin breakdown over bl hips, R knee, muscle and fat wasting throughout  Neuro: limited by confusion    LABS:                        9.9    8.80  )-----------( 165      ( 2020 06:44 )             31.9     11-    137  |  108  |  38<H>  ----------------------------<  93  3.8   |  23  |  1.76<H>    Ca    7.5<L>      2020 06:44        Albumin: Albumin, Serum: 2.0 g/dL ( @ 14:24)      Allergies    aspirin (Other (Severe))    Intolerances      MEDICATIONS  (STANDING):  dextrose 5% + sodium chloride 0.45% with potassium chloride 20 mEq/L 1000 milliLiter(s) (75 mL/Hr) IV Continuous <Continuous>  doxycycline IVPB 100 milliGRAM(s) IV Intermittent every 12 hours  heparin   Injectable 5000 Unit(s) SubCutaneous every 12 hours  metoprolol succinate ER 25 milliGRAM(s) Oral daily  piperacillin/tazobactam IVPB.. 3.375 Gram(s) IV Intermittent every 12 hours  QUEtiapine 25 milliGRAM(s) Oral at bedtime    MEDICATIONS  (PRN):  acetaminophen  IVPB .. 1000 milliGRAM(s) IV Intermittent once PRN Mild Pain (1 - 3), Moderate Pain (4 - 6), Severe Pain (7 - 10)  acetaminophen  IVPB .. 1000 milliGRAM(s) IV Intermittent once PRN Mild Pain (1 - 3), Moderate Pain (4 - 6), Severe Pain (7 - 10)  acetaminophen  IVPB .. 1000 milliGRAM(s) IV Intermittent once PRN Mild Pain (1 - 3), Moderate Pain (4 - 6), Severe Pain (7 - 10)

## 2020-11-06 NOTE — PROGRESS NOTE ADULT - SUBJECTIVE AND OBJECTIVE BOX
89 y.o. female with PMHx of syncope s/p PPM, CHF with left pleural effusion in the past, asthma, Iron deficiency anemia p/w progressively worsening oral intake (previously would eat pureed food but last 1-2 weeks stopped and wouldn't drink either), became confused, would not talk or respond BIBA from home and found to be in FLORA, sepsis with BL infected hip decubitus st IV Left worse than right based on CT, Failure to thrive, anemia, RLE edema and metabolic encephalopathy.     11/3:  Pt seen.  Awake.  Moaning.  Having wound care assessment.  Trying to bite/grab nurses hand.  Confused.  Nonverbal.    11/4:  Pt seen.  Moaning.  Itching.  Not eating.    11/5:  Pt seen.  Awake.  Itching.  Moaning.    11/6:  Pt seen.  Unchanged.  Moaning.  Itching.  Not eating.  Unable to take oral meds.      Review of system- All 10 systems reviewed and is as per HPI otherwise negative.     Vital Signs Last 24 Hrs  T(C): 36.3 (06 Nov 2020 15:59), Max: 36.8 (06 Nov 2020 04:32)  T(F): 97.3 (06 Nov 2020 15:59), Max: 98.3 (06 Nov 2020 04:32)  HR: 96 (06 Nov 2020 15:59) (96 - 105)  BP: 145/73 (06 Nov 2020 15:59) (134/67 - 145/73)  BP(mean): --  RR: 18 (06 Nov 2020 15:59) (18 - 18)  SpO2: 96% (06 Nov 2020 15:59) (96% - 98%)    PHYSICAL EXAM:  GENERAL: distress.  moaning.    HEAD:  Atraumatic, Normocephalic  EYES: EOMI, PERRLA  HEENT: dry MM  NECK: Supple, No JVD  NERVOUS SYSTEM: awake.  confused.  dementia.    CHEST/LUNG: Clear to auscultation bilaterally  HEART: Regular rate and rhythm; No murmurs, rubs, or gallops  ABDOMEN: Soft, Nontender, Nondistended; Bowel sounds present  GENITOURINARY- Voiding, no palpable bladder  EXTREMITIES:  extensive edema right leg.    MUSCULOSKELTAL-muscle wasting  SKIN-multiple areas of skin break down on hips/ legs.  Exposed bone on bilateral hips with large ulcerations.    11-06    137  |  108  |  38<H>  ----------------------------<  93  3.8   |  23  |  1.76<H>    Ca    7.5<L>      06 Nov 2020 06:44                              9.9    8.80  )-----------( 165      ( 06 Nov 2020 06:44 )             31.9     Culture Results:   Growth in anaerobic bottle: gram positive coccobacilli Culture Results:   Growth in anaerobic bottle: Proteus mirabilis     CT Abdomen and Pelvis No Cont (11.02.20 @ 16:12)   IMPRESSION:  Deep bilateral hip decubitus ulcers with exposure of the underlying greater trochanters. No definite underlying bony erosion or destruction. Small amount of gas in the left greater trochanteric bursa suggestive of bursitis.  < end of copied text >    MEDICATIONS  (STANDING):  dextrose 5% + sodium chloride 0.45% with potassium chloride 20 mEq/L 1000 milliLiter(s) (75 mL/Hr) IV Continuous <Continuous>  doxycycline IVPB 100 milliGRAM(s) IV Intermittent every 12 hours  heparin   Injectable 5000 Unit(s) SubCutaneous every 12 hours  metoprolol succinate ER 25 milliGRAM(s) Oral daily  piperacillin/tazobactam IVPB.. 3.375 Gram(s) IV Intermittent every 12 hours  QUEtiapine 25 milliGRAM(s) Oral at bedtime    MEDICATIONS  (PRN):  acetaminophen  IVPB .. 1000 milliGRAM(s) IV Intermittent once PRN Mild Pain (1 - 3), Moderate Pain (4 - 6), Severe Pain (7 - 10)  acetaminophen  IVPB .. 1000 milliGRAM(s) IV Intermittent once PRN Mild Pain (1 - 3), Moderate Pain (4 - 6), Severe Pain (7 - 10)  acetaminophen  IVPB .. 1000 milliGRAM(s) IV Intermittent once PRN Mild Pain (1 - 3), Moderate Pain (4 - 6), Severe Pain (7 - 10)

## 2020-11-07 LAB
ANION GAP SERPL CALC-SCNC: 6 MMOL/L — SIGNIFICANT CHANGE UP (ref 5–17)
BUN SERPL-MCNC: 28 MG/DL — HIGH (ref 7–23)
CALCIUM SERPL-MCNC: 7.8 MG/DL — LOW (ref 8.5–10.1)
CHLORIDE SERPL-SCNC: 112 MMOL/L — HIGH (ref 96–108)
CO2 SERPL-SCNC: 21 MMOL/L — LOW (ref 22–31)
CREAT SERPL-MCNC: 1.34 MG/DL — HIGH (ref 0.5–1.3)
GLUCOSE SERPL-MCNC: 99 MG/DL — SIGNIFICANT CHANGE UP (ref 70–99)
HCT VFR BLD CALC: 32.8 % — LOW (ref 34.5–45)
HGB BLD-MCNC: 9.7 G/DL — LOW (ref 11.5–15.5)
MCHC RBC-ENTMCNC: 24.2 PG — LOW (ref 27–34)
MCHC RBC-ENTMCNC: 29.6 GM/DL — LOW (ref 32–36)
MCV RBC AUTO: 81.8 FL — SIGNIFICANT CHANGE UP (ref 80–100)
PLATELET # BLD AUTO: 49 K/UL — LOW (ref 150–400)
POTASSIUM SERPL-MCNC: 3.8 MMOL/L — SIGNIFICANT CHANGE UP (ref 3.5–5.3)
POTASSIUM SERPL-SCNC: 3.8 MMOL/L — SIGNIFICANT CHANGE UP (ref 3.5–5.3)
RBC # BLD: 4.01 M/UL — SIGNIFICANT CHANGE UP (ref 3.8–5.2)
RBC # FLD: 19.4 % — HIGH (ref 10.3–14.5)
SODIUM SERPL-SCNC: 139 MMOL/L — SIGNIFICANT CHANGE UP (ref 135–145)
WBC # BLD: 7.57 K/UL — SIGNIFICANT CHANGE UP (ref 3.8–10.5)
WBC # FLD AUTO: 7.57 K/UL — SIGNIFICANT CHANGE UP (ref 3.8–10.5)

## 2020-11-07 PROCEDURE — 99232 SBSQ HOSP IP/OBS MODERATE 35: CPT

## 2020-11-07 RX ADMIN — DEXTROSE MONOHYDRATE, SODIUM CHLORIDE, AND POTASSIUM CHLORIDE 75 MILLILITER(S): 50; .745; 4.5 INJECTION, SOLUTION INTRAVENOUS at 06:33

## 2020-11-07 RX ADMIN — Medication 110 MILLIGRAM(S): at 10:15

## 2020-11-07 RX ADMIN — PIPERACILLIN AND TAZOBACTAM 25 GRAM(S): 4; .5 INJECTION, POWDER, LYOPHILIZED, FOR SOLUTION INTRAVENOUS at 22:42

## 2020-11-07 RX ADMIN — PIPERACILLIN AND TAZOBACTAM 25 GRAM(S): 4; .5 INJECTION, POWDER, LYOPHILIZED, FOR SOLUTION INTRAVENOUS at 11:19

## 2020-11-07 RX ADMIN — Medication 110 MILLIGRAM(S): at 21:04

## 2020-11-07 RX ADMIN — HEPARIN SODIUM 5000 UNIT(S): 5000 INJECTION INTRAVENOUS; SUBCUTANEOUS at 10:14

## 2020-11-07 NOTE — PROGRESS NOTE ADULT - ASSESSMENT
89 y.o. female with PMHx of syncope s/p PPM, CHF with left pleural effusion in the past, asthma, Iron deficiency anemia p/w progressively worsening oral intake (previously would eat pureed food but last 1-2 weeks stopped and wouldn't drink either), became confused, would not talk or respond BIBA from home and found to be in FLORA, sepsis with BL infected hip decubitus st IV Left worse than right based on CT, Failure to thrive, anemia, RLE edema and metabolic encephalopathy.    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    Hemodynamics improved.     Appreciate ID input.    Cont Vanco/ Doxy for now.  Day #5    F/u repeat cultures for clearance.    Awaiting plastics eval re:  extensive decubitus ulcerations.  ? Debridement.  (Called on 11/3, 11/4, and 11/5 to on call docs).    Appreciate palliative input.  Family wishes to proceed with further wound debridement if indicated and continued wound care.    Possible hospice at discharge.      #FLORA:    Likely due to ATN with sepsis and decreased po intake.    Trend with labs.    Cont IVF.      #Hypernatremia:    Resolved.  Continue IVF.  No oral intake.    Repeat labs in am.      #Chronic Anemia:    Hgb improved from 7 to 11.    Hemoccult stools negative.  No obvious acute bleeding.      #FTT due to advanced age/ dementia:    No oral intake to date.  Continue to reassess.  No feeding tube as per MOLST.      #Metabolic encephalopathy:    Secondary to sepsis with underlying dementia.      #Right LE DVT:    Extensive occlusive clot of Right femoral vein through right posterial tibial vein.    AC was not started due to anemia-- tried to call family to discuss.  Await call back.    Will hold off on full AC for now as presumed more chronic and patient is functional quadriplegic due to FTT/ dementia.      #Functional quadriplegia due to advanced dementia    #Dysphagia:    Dysphagia 1 with nectar liquids.  Speech f/u.      #DVT Proph:  Heparin SQ.      CODE status/ Goals of Care:  DNR/ DNI/ No feeding tube.  Appreciate palliative input.      Advised Shar (11/5) that her decubitus ulcerations will never heal and she will be at risk for recurrent sepsis not to mention ongoing dehydration without eating/ drinking and readmissions due to this.    Continued palliative f/u and goals of care.    Surrogates-- Eric:  [phone]/ Shar:  [phone]   89 y.o. female with PMHx of syncope s/p PPM, CHF with left pleural effusion in the past, asthma, Iron deficiency anemia p/w progressively worsening oral intake (previously would eat pureed food but last 1-2 weeks stopped and wouldn't drink either), became confused, would not talk or respond BIBA from home and found to be in [place], sepsis with BL infected hip decubitus st IV Left worse than right based on CT, Failure to thrive, anemia, RLE edema and metabolic encephalopathy.    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    Hemodynamics improved.     Appreciate ID input.    Cont Vanco/ Doxy for now.  Day #5    F/u repeat cultures for clearance.    plastics eval: no need for debridement, c/w local wound care  Appreciate palliative input.    Possible hospice at discharge.      #FLORA:    Likely due to ATN with sepsis and decreased po intake.    Trend with labs.    Cont IVF.      #Hypernatremia:    Resolved.  Continue IVF.  No oral intake.    Repeat labs in am.      #Chronic Anemia:    Hgb improved from 7 to 11.    Hemoccult stools negative.  No obvious acute bleeding.      #FTT due to advanced age/ dementia:    No oral intake to date.  Continue to reassess.  No feeding tube as per MOLST.      #Metabolic encephalopathy:    Secondary to sepsis with underlying dementia.      #Right LE DVT:    Extensive occlusive clot of Right femoral vein through right posterial tibial vein.    AC was not started due to anemia-- tried to call family to discuss.  Await call back.    Will hold off on full AC for now as presumed more chronic and patient is functional quadriplegic due to FTT/ dementia.      #Functional quadriplegia due to advanced dementia    #Dysphagia:    Dysphagia 1 with nectar liquids.  Speech f/u.      #DVT Proph:  Heparin SQ.      CODE status/ Goals of Care:  DNR/ DNI/ No feeding tube.  Appreciate palliative input.      Advised Shar (11/5) that her decubitus ulcerations will never heal and she will be at risk for recurrent sepsis not to mention ongoing dehydration without eating/ drinking and readmissions due to this.    Continued palliative f/u and goals of care.    Surrogates-- Eric:  [phone]/ Shar:  [phone]

## 2020-11-07 NOTE — PROGRESS NOTE ADULT - SUBJECTIVE AND OBJECTIVE BOX
Date of service: 20 @ 11:18    Lying in bed in NAD  Weak looking  Lethargic    ROS: nonverbal    MEDICATIONS  (STANDING):  dextrose 5% + sodium chloride 0.45% with potassium chloride 20 mEq/L 1000 milliLiter(s) (75 mL/Hr) IV Continuous <Continuous>  doxycycline IVPB 100 milliGRAM(s) IV Intermittent every 12 hours  heparin   Injectable 5000 Unit(s) SubCutaneous every 12 hours  metoprolol succinate ER 25 milliGRAM(s) Oral daily  piperacillin/tazobactam IVPB.. 3.375 Gram(s) IV Intermittent every 12 hours  QUEtiapine 25 milliGRAM(s) Oral at bedtime    Vital Signs Last 24 Hrs  T(C): 36.6 (2020 08:58), Max: 36.6 (2020 08:58)  T(F): 97.8 (2020 08:58), Max: 97.8 (2020 08:58)  HR: 101 (2020 08:58) (90 - 101)  BP: 127/60 (2020 08:58) (125/68 - 145/73)  BP(mean): --  RR: 18 (2020 00:31) (18 - 18)  SpO2: 96% (2020 08:58) (96% - 96%)     Physical exam:    Constitutional:  No acute distress  HEENT: NC/AT, EOMI, PERRLA, conjunctivae clear  Neck: supple; thyroid not palpable  Back: no tenderness  Respiratory: respiratory effort normal; clear to auscultation  Cardiovascular: S1S2 regular, no murmurs  Abdomen: soft, not tender, not distended, positive BS  Genitourinary: no suprapubic tenderness  Lymphatic: no LN palpable  Musculoskeletal: no muscle tenderness, no joint swelling or tenderness  Right hip decubitus ulcer with palpable bone, areas of necrosis, surrounding erythema; scant discharge  Large left hip decubitus ulcer with palpable bone and partial necrotic base  Extremities: 1+ pedal edema  Neurological/ Psychiatric: confusion, moving all extremities  Skin: no rashes; no palpable lesions    Labs: reviewed                        9.9    8.80  )-----------( 165      ( 2020 06:44 )             31.9     11-06    137  |  108  |  38<H>  ----------------------------<  93  3.8   |  23  |  1.76<H>    Ca    7.5<L>      2020 06:44    Ferritin, Serum: 117 ng/mL (20 @ 21:11)  C-Reactive Protein, Serum: 26.74 mg/dL (20 @ 14:24)                        10.9   11.12 )-----------( 155      ( 2020 08:35 )             35.7     11-05    140  |  107  |  50<H>  ----------------------------<  108<H>  3.3<L>   |  26  |  2.21<H>    Ca    7.5<L>      2020 06:41    Ferritin, Serum: 117 ng/mL (20 @ 21:11)  C-Reactive Protein, Serum: 26.74 mg/dL (20 @ 14:24)                        11.0   15.93 )-----------( 166      ( 2020 08:11 )             36.0     11-03    154<H>  |  117<H>  |  69<H>  ----------------------------<  104<H>  3.9   |  29  |  3.23<H>    Ca    8.1<L>      2020 08:11  Phos  4.7       Mg     2.8         TPro  7.0  /  Alb  2.0<L>  /  TBili  0.7  /  DBili  x   /  AST  20  /  ALT  12  /  AlkPhos  105  11-     LIVER FUNCTIONS - ( 2020 14:24 )  Alb: 2.0 g/dL / Pro: 7.0 gm/dL / ALK PHOS: 105 U/L / ALT: 12 U/L / AST: 20 U/L / GGT: x           Urinalysis Basic - ( 2020 14:24 )    Color: Yellow / Appearance: very cloudy / S.015 / pH: x  Gluc: x / Ketone: Negative  / Bili: Moderate / Urobili: 1 mg/dL   Blood: x / Protein: 30 mg/dL / Nitrite: Negative   Leuk Esterase: Negative / RBC: 3-5 /HPF / WBC 0-2   Sq Epi: x / Non Sq Epi: Moderate / Bacteria: Occasional      Culture - Urine (collected 2020 14:24)  Source: .Urine Clean Catch (Midstream)  Final Report (2020 14:18):    No growth    Culture - Blood (collected 2020 14:24)  Source: .Blood Blood-Peripheral  Gram Stain (2020 06:11):    Growth in anaerobic bottle: gram positive coccobacilli  Preliminary Report (2020 06:12):    Growth in anaerobic bottle: gram positive coccobacilli    "Due to technical problems, Proteus sp. will Not be reported as part of    the BCID panel until further notice"    ***Blood Panel PCR results on this specimen are available    approximately 3 hours after the Gram stain result.***    Gram stain, PCR, and/or culture results may not always    correspond due to difference in methodologies.    ************************************************************    This PCR assay was performed using [employer].    The following targets are tested for: Enterococcus,    vancomycin resistant enterococci, Listeria monocytogenes,    coagulase negative staphylococci, S. aureus,    methicillin resistant S. aureus, Streptococcus agalactiae    (Group B), S. pneumoniae, S. pyogenes (Group A),    Acinetobacter baumannii, Enterobacter cloacae, E. coli,    Klebsiella oxytoca, K. pneumoniae, Proteus sp.,    Serratia marcescens, Haemophilus influenzae,    Neisseria meningitidis, Pseudomonas aeruginosa, Candida    albicans, C. glabrata, C krusei, C parapsilosis,    C. tropicalis and the KPC resistance gene.  Organism: Blood Culture PCR (2020 07:18)      Culture - Blood (collected 2020 14:24)  Source: .Blood Blood-Peripheral  Gram Stain (2020 11:00):    Growth in anaerobic bottle: Gram Negative Rods  Final Report (2020 09:12):    Growth in anaerobic bottle: Proteus mirabilis  Organism: Blood Culture PCR  Proteus mirabilis (2020 09:12)  Organism: Proteus mirabilis (2020 09:12)      -  Amikacin: S <=16      -  Ampicillin: R >16 These ampicillin results predict results for amoxicillin      -  Ampicillin/Sulbactam: S <=4/2 Enterobacter, Citrobacter, and Serratia may develop resistance during prolonged therapy (3-4 days)      -  Aztreonam: S <=4      -  Cefazolin: R >16 Enterobacter, Citrobacter, and Serratia may develop resistance during prolonged therapy (3-4 days)      -  Cefepime: S <=2      -  Cefoxitin: S <=8      -  Ceftriaxone: S <=1 Enterobacter, Citrobacter, and Serratia may develop resistance during prolonged therapy      -  Ciprofloxacin: S <=0.25      -  Ertapenem: S <=0.5      -  Gentamicin: S <=2      -  Levofloxacin: S <=0.5      -  Meropenem: S <=1      -  Piperacillin/Tazobactam: S <=8      -  Tobramycin: I 8      -  Trimethoprim/Sulfamethoxazole: R >38      Method Type: MELLY  Organism: Blood Culture PCR (2020 09:12)      COVID-19 PCR: NotDetec (20 @ 15:05)    Radiology: all available radiological tests reviewed    < from: CT Abdomen and Pelvis No Cont (20 @ 16:12) >  Deep bilateral hip decubitus ulcers with exposure of the underlying greater trochanters. No definite underlying bony erosion or destruction. Small amount of gas in the left greater trochanteric bursa suggestive of bursitis.    < end of copied text >      Advanced directives addressed: full resuscitation

## 2020-11-07 NOTE — PROGRESS NOTE ADULT - SUBJECTIVE AND OBJECTIVE BOX
89 y.o. female with PMHx of syncope s/p PPM, CHF with left pleural effusion in the past, asthma, Iron deficiency anemia p/w progressively worsening oral intake (previously would eat pureed food but last 1-2 weeks stopped and wouldn't drink either), became confused, would not talk or respond BIBA from home and found to be in [place]OR[place], sepsis with BL infected hip decubitus st IV Left worse than right based on CT, Failure to thrive, anemia, RLE edema and metabolic encephalopathy.     11/3:  Pt seen.  Awake.  Moaning.  Having wound care assessment.  Trying to bite/grab nurses hand.  Confused.  Nonverbal.    11/4:  Pt seen.  Moaning.  Itching.  Not eating.    11/5:  Pt seen.  Awake.  Itching.  Moaning.    11/6:  Pt seen.  Unchanged.  Moaning.  Itching.  Not eating.  Unable to take oral meds.    11.7: confused, moaning     Review of system: unable to obtain due to dementia      Vital Signs Last 24 Hrs  T(C): 36.6 (07 Nov 2020 08:58), Max: 36.6 (07 Nov 2020 08:58)  T(F): 97.8 (07 Nov 2020 08:58), Max: 97.8 (07 Nov 2020 08:58)  HR: 101 (07 Nov 2020 08:58) (90 - 101)  BP: 127/60 (07 Nov 2020 08:58) (125/68 - 145/73)  RR: 18 (07 Nov 2020 00:31) (18 - 18)  SpO2: 96% (07 Nov 2020 08:58) (96% - 96%)    PHYSICAL EXAM:  GENERAL: distress.  moaning.    HEAD:  Atraumatic, Normocephalic  EYES: EOMI, PERRLA  HEENT: dry MM  NECK: Supple, No JVD  NERVOUS SYSTEM: awake.  confused.  dementia.    CHEST/LUNG: Clear to auscultation bilaterally  HEART: Regular rate and rhythm; No murmurs, rubs, or gallops  ABDOMEN: Soft, Nontender, Nondistended; Bowel sounds present  GENITOURINARY- Voiding, no palpable bladder  EXTREMITIES:  extensive edema right leg.    MUSCULOSKELTAL-muscle wasting  SKIN-multiple areas of skin break down on hips/ legs.  Exposed bone on bilateral hips with large ulcerations.    11-06    137  |  108  |  38<H>  ----------------------------<  93  3.8   |  23  |  1.76<H>    Ca    7.5<L>      06 Nov 2020 06:44                              9.9    8.80  )-----------( 165      ( 06 Nov 2020 06:44 )             31.9     Culture Results:   Growth in anaerobic bottle: gram positive coccobacilli Culture Results:   Growth in anaerobic bottle: Proteus mirabilis     CT Abdomen and Pelvis No Cont (11.02.20 @ 16:12)   IMPRESSION:  Deep bilateral hip decubitus ulcers with exposure of the underlying greater trochanters. No definite underlying bony erosion or destruction. Small amount of gas in the left greater trochanteric bursa suggestive of bursitis.  < end of copied text >    MEDICATIONS  (STANDING):  dextrose 5% + sodium chloride 0.45% with potassium chloride 20 mEq/L 1000 milliLiter(s) (75 mL/Hr) IV Continuous <Continuous>  doxycycline IVPB 100 milliGRAM(s) IV Intermittent every 12 hours  heparin   Injectable 5000 Unit(s) SubCutaneous every 12 hours  metoprolol succinate ER 25 milliGRAM(s) Oral daily  piperacillin/tazobactam IVPB.. 3.375 Gram(s) IV Intermittent every 12 hours  QUEtiapine 25 milliGRAM(s) Oral at bedtime    MEDICATIONS  (PRN):  acetaminophen  IVPB .. 1000 milliGRAM(s) IV Intermittent once PRN Mild Pain (1 - 3), Moderate Pain (4 - 6), Severe Pain (7 - 10)  acetaminophen  IVPB .. 1000 milliGRAM(s) IV Intermittent once PRN Mild Pain (1 - 3), Moderate Pain (4 - 6), Severe Pain (7 - 10)  acetaminophen  IVPB .. 1000 milliGRAM(s) IV Intermittent once PRN Mild Pain (1 - 3), Moderate Pain (4 - 6), Severe Pain (7 - 10)

## 2020-11-07 NOTE — PROGRESS NOTE ADULT - ASSESSMENT
89 y/o female with h/o syncope s/p PPM, CHF with left pleural effusion in the past, asthma, iron deficiency anemia was admitted on 11/2 for poorly healing hip ulcerations and increased confusion. She was noted with progressively worsening oral intake (previously would eat pureed food but last 1-2 weeks PTA, she stopped and wouldn't drink either), increased. confused, would not talk or respond> IN ER she was noted with high creatinine and b/l hip decubitus st IV Left worse than right. She received zosyn IV and vancomycin IV.     1. Sepsis with PRMI and GP coccobacilli. B/l hip decubitus ulcers. B/l hips acute on chronic OM with surrounding cellulitis. ARF.   -fever is down  -encephalopathy is persistent  -noted with two different organisms in blood culture  -leukocytosis resolving  -renal function is improving  -BC x 2, urine c/s noted  -on zosyn 3 gm IV q12h and doxycycline 100 mg IV q12h # 5  -tolerating abx well so far; no side effects noted  -local wound care  -repeat BC x 2 collected  -aspiration precautions  -continue abx coverage  -monitor temps  -f/u CBC  -supportive care  2. Other issues:   -care per medicine

## 2020-11-08 LAB
HCT VFR BLD CALC: 37.2 % — SIGNIFICANT CHANGE UP (ref 34.5–45)
HGB BLD-MCNC: 10.9 G/DL — LOW (ref 11.5–15.5)
MCHC RBC-ENTMCNC: 24 PG — LOW (ref 27–34)
MCHC RBC-ENTMCNC: 29.3 GM/DL — LOW (ref 32–36)
MCV RBC AUTO: 81.8 FL — SIGNIFICANT CHANGE UP (ref 80–100)
PLATELET # BLD AUTO: 182 K/UL — SIGNIFICANT CHANGE UP (ref 150–400)
RBC # BLD: 4.55 M/UL — SIGNIFICANT CHANGE UP (ref 3.8–5.2)
RBC # FLD: 19.9 % — HIGH (ref 10.3–14.5)
WBC # BLD: 7.53 K/UL — SIGNIFICANT CHANGE UP (ref 3.8–10.5)
WBC # FLD AUTO: 7.53 K/UL — SIGNIFICANT CHANGE UP (ref 3.8–10.5)

## 2020-11-08 PROCEDURE — 99232 SBSQ HOSP IP/OBS MODERATE 35: CPT

## 2020-11-08 RX ADMIN — HEPARIN SODIUM 5000 UNIT(S): 5000 INJECTION INTRAVENOUS; SUBCUTANEOUS at 09:19

## 2020-11-08 RX ADMIN — Medication 110 MILLIGRAM(S): at 21:07

## 2020-11-08 RX ADMIN — DEXTROSE MONOHYDRATE, SODIUM CHLORIDE, AND POTASSIUM CHLORIDE 75 MILLILITER(S): 50; .745; 4.5 INJECTION, SOLUTION INTRAVENOUS at 10:40

## 2020-11-08 RX ADMIN — Medication 110 MILLIGRAM(S): at 09:19

## 2020-11-08 RX ADMIN — PIPERACILLIN AND TAZOBACTAM 25 GRAM(S): 4; .5 INJECTION, POWDER, LYOPHILIZED, FOR SOLUTION INTRAVENOUS at 22:26

## 2020-11-08 RX ADMIN — PIPERACILLIN AND TAZOBACTAM 25 GRAM(S): 4; .5 INJECTION, POWDER, LYOPHILIZED, FOR SOLUTION INTRAVENOUS at 10:40

## 2020-11-08 RX ADMIN — HEPARIN SODIUM 5000 UNIT(S): 5000 INJECTION INTRAVENOUS; SUBCUTANEOUS at 22:25

## 2020-11-08 NOTE — PROGRESS NOTE ADULT - SUBJECTIVE AND OBJECTIVE BOX
Date of service: 20 @ 12:48    Lying in bed in NAD  Weak looking  Lethargic  Dose not seem in pain    ROS: no fever or chills; poorly verbal    MEDICATIONS  (STANDING):  dextrose 5% + sodium chloride 0.45% with potassium chloride 20 mEq/L 1000 milliLiter(s) (75 mL/Hr) IV Continuous <Continuous>  doxycycline IVPB 100 milliGRAM(s) IV Intermittent every 12 hours  heparin   Injectable 5000 Unit(s) SubCutaneous every 12 hours  metoprolol succinate ER 25 milliGRAM(s) Oral daily  piperacillin/tazobactam IVPB.. 3.375 Gram(s) IV Intermittent every 12 hours  QUEtiapine 25 milliGRAM(s) Oral at bedtime    Vital Signs Last 24 Hrs  T(C): 37 (2020 20:50), Max: 37 (2020 15:47)  T(F): 98.6 (2020 20:50), Max: 98.6 (2020 15:47)  HR: 100 (2020 20:50) (100 - 100)  BP: 148/62 (2020 20:50) (132/81 - 148/62)  BP(mean): --  RR: 22 (2020 20:50) (18 - 22)  SpO2: 99% (2020 20:50) (95% - 99%)     Physical exam:    Constitutional:  No acute distress  HEENT: NC/AT, EOMI, PERRLA, conjunctivae clear  Neck: supple; thyroid not palpable  Back: no tenderness  Respiratory: respiratory effort normal; clear to auscultation  Cardiovascular: S1S2 regular, no murmurs  Abdomen: soft, not tender, not distended, positive BS  Genitourinary: no suprapubic tenderness  Lymphatic: no LN palpable  Musculoskeletal: no muscle tenderness, no joint swelling or tenderness  Right hip decubitus ulcer with palpable bone, areas of necrosis, surrounding erythema; scant discharge  Large left hip decubitus ulcer with palpable bone and partial necrotic base  Extremities: 1+ pedal edema  Neurological/ Psychiatric: confusion, moving all extremities  Skin: no rashes; no palpable lesions    Labs: reviewed                        10.9   7.53  )-----------( 182      ( 2020 08:02 )             37.2     11-07    139  |  112<H>  |  28<H>  ----------------------------<  99  3.8   |  21<L>  |  1.34<H>    Ca    7.8<L>      2020 08:45    Ferritin, Serum: 117 ng/mL (20 @ 21:11)  C-Reactive Protein, Serum: 26.74 mg/dL (20 @ 14:24)                        11.0   15.93 )-----------( 166      ( 2020 08:11 )             36.0     11-03    154<H>  |  117<H>  |  69<H>  ----------------------------<  104<H>  3.9   |  29  |  3.23<H>    Ca    8.1<L>      2020 08:11  Phos  4.7     11-  Mg     2.8     11    TPro  7.0  /  Alb  2.0<L>  /  TBili  0.7  /  DBili  x   /  AST  20  /  ALT  12  /  AlkPhos  105  11-02     LIVER FUNCTIONS - ( 2020 14:24 )  Alb: 2.0 g/dL / Pro: 7.0 gm/dL / ALK PHOS: 105 U/L / ALT: 12 U/L / AST: 20 U/L / GGT: x           Urinalysis Basic - ( 2020 14:24 )    Color: Yellow / Appearance: very cloudy / S.015 / pH: x  Gluc: x / Ketone: Negative  / Bili: Moderate / Urobili: 1 mg/dL   Blood: x / Protein: 30 mg/dL / Nitrite: Negative   Leuk Esterase: Negative / RBC: 3-5 /HPF / WBC 0-2   Sq Epi: x / Non Sq Epi: Moderate / Bacteria: Occasional      Culture - Urine (collected 2020 14:24)  Source: .Urine Clean Catch (Midstream)  Final Report (2020 14:18):    No growth    Culture - Blood (collected 2020 14:24)  Source: .Blood Blood-Peripheral  Gram Stain (2020 06:11):    Growth in anaerobic bottle: gram positive coccobacilli  Preliminary Report (2020 06:12):    Growth in anaerobic bottle: gram positive coccobacilli    "Due to technical problems, Proteus sp. will Not be reported as part of    the BCID panel until further notice"    ***Blood Panel PCR results on this specimen are available    approximately 3 hours after the Gram stain result.***    Gram stain, PCR, and/or culture results may not always    correspond due to difference in methodologies.    ************************************************************    This PCR assay was performed using [employer].    The following targets are tested for: Enterococcus,    vancomycin resistant enterococci, Listeria monocytogenes,    coagulase negative staphylococci, S. aureus,    methicillin resistant S. aureus, Streptococcus agalactiae    (Group B), S. pneumoniae, S. pyogenes (Group A),    Acinetobacter baumannii, Enterobacter cloacae, E. coli,    Klebsiella oxytoca, K. pneumoniae, Proteus sp.,    Serratia marcescens, Haemophilus influenzae,    Neisseria meningitidis, Pseudomonas aeruginosa, Candida    albicans, C. glabrata, C krusei, C parapsilosis,    C. tropicalis and the KPC resistance gene.  Organism: Blood Culture PCR (2020 07:18)      Culture - Blood (collected 2020 14:24)  Source: .Blood Blood-Peripheral  Gram Stain (2020 11:00):    Growth in anaerobic bottle: Gram Negative Rods  Final Report (2020 09:12):    Growth in anaerobic bottle: Proteus mirabilis  Organism: Blood Culture PCR  Proteus mirabilis (2020 09:12)  Organism: Proteus mirabilis (2020 09:12)      -  Amikacin: S <=16      -  Ampicillin: R >16 These ampicillin results predict results for amoxicillin      -  Ampicillin/Sulbactam: S <=4/2 Enterobacter, Citrobacter, and Serratia may develop resistance during prolonged therapy (3-4 days)      -  Aztreonam: S <=4      -  Cefazolin: R >16 Enterobacter, Citrobacter, and Serratia may develop resistance during prolonged therapy (3-4 days)      -  Cefepime: S <=2      -  Cefoxitin: S <=8      -  Ceftriaxone: S <=1 Enterobacter, Citrobacter, and Serratia may develop resistance during prolonged therapy      -  Ciprofloxacin: S <=0.25      -  Ertapenem: S <=0.5      -  Gentamicin: S <=2      -  Levofloxacin: S <=0.5      -  Meropenem: S <=1      -  Piperacillin/Tazobactam: S <=8      -  Tobramycin: I 8      -  Trimethoprim/Sulfamethoxazole: R >      Method Type: MELLY  Organism: Blood Culture PCR (2020 09:12)        COVID-19 PCR: NotDetec (20 @ 15:05)    Radiology: all available radiological tests reviewed    < from: CT Abdomen and Pelvis No Cont (20 @ 16:12) >  Deep bilateral hip decubitus ulcers with exposure of the underlying greater trochanters. No definite underlying bony erosion or destruction. Small amount of gas in the left greater trochanteric bursa suggestive of bursitis.    < end of copied text >      Advanced directives addressed: full resuscitation

## 2020-11-08 NOTE — PROGRESS NOTE ADULT - SUBJECTIVE AND OBJECTIVE BOX
89 y.o. female with PMHx of syncope s/p PPM, CHF with left pleural effusion in the past, asthma, Iron deficiency anemia p/w progressively worsening oral intake (previously would eat pureed food but last 1-2 weeks stopped and wouldn't drink either), became confused, would not talk or respond BIBA from home and found to be in [place], sepsis with BL infected hip decubitus st IV Left worse than right based on CT, Failure to thrive, anemia, RLE edema and metabolic encephalopathy.     11/3:  Pt seen.  Awake.  Moaning.  Having wound care assessment.  Trying to bite/grab nurses hand.  Confused.  Nonverbal.    11/4:  Pt seen.  Moaning.  Itching.  Not eating.    11/5:  Pt seen.  Awake.  Itching.  Moaning.    11/6:  Pt seen.  Unchanged.  Moaning.  Itching.  Not eating.  Unable to take oral meds.    11.7: confused, moaning   11/08/20: Patient seen and examined, confused.     Review of system: unable to obtain due to dementia        Vital Signs Last 24 Hrs  T(C): 37 (07 Nov 2020 20:50), Max: 37 (07 Nov 2020 15:47)  T(F): 98.6 (07 Nov 2020 20:50), Max: 98.6 (07 Nov 2020 15:47)  HR: 100 (07 Nov 2020 20:50) (100 - 100)  BP: 148/62 (07 Nov 2020 20:50) (132/81 - 148/62)  BP(mean): --  RR: 22 (07 Nov 2020 20:50) (18 - 22)  SpO2: 99% (07 Nov 2020 20:50) (95% - 99%)      PHYSICAL EXAM:  GENERAL: distress.  moaning.    HEAD:  Atraumatic, Normocephalic  EYES: EOMI, PERRLA  HEENT: dry MM  NECK: Supple, No JVD  NERVOUS SYSTEM: awake.  confused.  dementia.    CHEST/LUNG: Clear to auscultation bilaterally  HEART: Regular rate and rhythm; No murmurs, rubs, or gallops  ABDOMEN: Soft, Nontender, Nondistended; Bowel sounds present  GENITOURINARY- Voiding, no palpable bladder  EXTREMITIES:  extensive edema right leg.    MUSCULOSKELTAL-muscle wasting  SKIN-multiple areas of skin break down on hips/ legs.  Exposed bone on bilateral hips with large ulcerations.                              10.9   7.53  )-----------( 182      ( 08 Nov 2020 08:02 )             37.2     07 Nov 2020 08:45    139    |  112    |  28     ----------------------------<  99     3.8     |  21     |  1.34     Ca    7.8        07 Nov 2020 08:45        Culture Results:   Growth in anaerobic bottle: gram positive coccobacilli Culture Results:   Growth in anaerobic bottle: Proteus mirabilis     CT Abdomen and Pelvis No Cont (11.02.20 @ 16:12)   IMPRESSION:  Deep bilateral hip decubitus ulcers with exposure of the underlying greater trochanters. No definite underlying bony erosion or destruction. Small amount of gas in the left greater trochanteric bursa suggestive of bursitis.  < end of copied text >    MEDICATIONS  (STANDING):  dextrose 5% + sodium chloride 0.45% with potassium chloride 20 mEq/L 1000 milliLiter(s) (75 mL/Hr) IV Continuous <Continuous>  doxycycline IVPB 100 milliGRAM(s) IV Intermittent every 12 hours  heparin   Injectable 5000 Unit(s) SubCutaneous every 12 hours  metoprolol succinate ER 25 milliGRAM(s) Oral daily  piperacillin/tazobactam IVPB.. 3.375 Gram(s) IV Intermittent every 12 hours  QUEtiapine 25 milliGRAM(s) Oral at bedtime    MEDICATIONS  (PRN):  acetaminophen  IVPB .. 1000 milliGRAM(s) IV Intermittent once PRN Mild Pain (1 - 3), Moderate Pain (4 - 6), Severe Pain (7 - 10)  acetaminophen  IVPB .. 1000 milliGRAM(s) IV Intermittent once PRN Mild Pain (1 - 3), Moderate Pain (4 - 6), Severe Pain (7 - 10)  acetaminophen  IVPB .. 1000 milliGRAM(s) IV Intermittent once PRN Mild Pain (1 - 3), Moderate Pain (4 - 6), Severe Pain (7 - 10)

## 2020-11-08 NOTE — PROGRESS NOTE ADULT - ASSESSMENT
89 y.o. female with PMHx of syncope s/p PPM, CHF with left pleural effusion in the past, asthma, Iron deficiency anemia p/w progressively worsening oral intake (previously would eat pureed food but last 1-2 weeks stopped and wouldn't drink either), became confused, would not talk or respond BIBA from home and found to be in [place], sepsis with BL infected hip decubitus st IV Left worse than right based on CT, Failure to thrive, anemia, RLE edema and metabolic encephalopathy.    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    Hemodynamics improved.     Appreciate ID input.    Cont Vanco/ Doxy for now.  Day #6  F/u repeat cultures for clearance.    plastics eval: no need for debridement, c/w local wound care  Appreciate palliative input.    Possible hospice at discharge.      #FLORA:  resolving  Likely due to ATN with sepsis and decreased po intake.    Trend with labs.    Cont IVF.      #Hypernatremia:    Resolved.  Continue IVF.  No oral intake.    Repeat labs in am.      #Chronic Anemia:    Hgb improved from 7 to 11.    Hemoccult stools negative.  No obvious acute bleeding.      #FTT due to advanced age/ dementia:    No oral intake to date.  Continue to reassess.  No feeding tube as per MOLST.      #Metabolic encephalopathy:    Secondary to sepsis with underlying dementia.      #Right LE DVT:    Extensive occlusive clot of Right femoral vein through right posterial tibial vein.    AC was not started due to anemia-- tried to call family to discuss.  Await call back.    Will hold off on full AC for now as presumed more chronic and patient is functional quadriplegic due to FTT/ dementia.      #Functional quadriplegia due to advanced dementia    #Dysphagia:    Dysphagia 1 with nectar liquids.  Speech f/u.      #DVT Proph:  Heparin SQ.      CODE status/ Goals of Care:  DNR/ DNI/ No feeding tube.  Appreciate palliative input.      Advised Shar (11/5) that her decubitus ulcerations will never heal and she will be at risk for recurrent sepsis not to mention ongoing dehydration without eating/ drinking and readmissions due to this.    Continued palliative f/u and goals of care.    Surrogates-- Eric:  [phone]/ Shar:  [phone]

## 2020-11-08 NOTE — PROGRESS NOTE ADULT - ASSESSMENT
89 y/o female with h/o syncope s/p PPM, CHF with left pleural effusion in the past, asthma, iron deficiency anemia was admitted on 11/2 for poorly healing hip ulcerations and increased confusion. She was noted with progressively worsening oral intake (previously would eat pureed food but last 1-2 weeks PTA, she stopped and wouldn't drink either), increased. confused, would not talk or respond> IN ER she was noted with high creatinine and b/l hip decubitus st IV Left worse than right. She received zosyn IV and vancomycin IV.     1. Sepsis with PRMI and GP coccobacilli. B/l hip decubitus ulcers. B/l hips acute on chronic OM with surrounding cellulitis. ARF.   -fever is down  -encephalopathy is persistent  -noted with two different organisms in blood culture; the lab is having difficulty isolating the GP coccobacilli isolated in blood culture  -leukocytosis resolving  -renal function is improving  -on zosyn 3 gm IV q12h and doxycycline 100 mg IV q12h # 6  -tolerating abx well so far; no side effects noted  -local wound care  -repeat BC x 2  -aspiration precautions  -continue abx coverage  -monitor temps  -f/u CBC  -supportive care  2. Other issues:   -care per medicine

## 2020-11-09 LAB
ANION GAP SERPL CALC-SCNC: 8 MMOL/L — SIGNIFICANT CHANGE UP (ref 5–17)
BUN SERPL-MCNC: 19 MG/DL — SIGNIFICANT CHANGE UP (ref 7–23)
CALCIUM SERPL-MCNC: 7.8 MG/DL — LOW (ref 8.5–10.1)
CHLORIDE SERPL-SCNC: 112 MMOL/L — HIGH (ref 96–108)
CO2 SERPL-SCNC: 21 MMOL/L — LOW (ref 22–31)
CREAT SERPL-MCNC: 1.14 MG/DL — SIGNIFICANT CHANGE UP (ref 0.5–1.3)
GLUCOSE SERPL-MCNC: 112 MG/DL — HIGH (ref 70–99)
POTASSIUM SERPL-MCNC: 4.1 MMOL/L — SIGNIFICANT CHANGE UP (ref 3.5–5.3)
POTASSIUM SERPL-SCNC: 4.1 MMOL/L — SIGNIFICANT CHANGE UP (ref 3.5–5.3)
SODIUM SERPL-SCNC: 141 MMOL/L — SIGNIFICANT CHANGE UP (ref 135–145)

## 2020-11-09 PROCEDURE — 99232 SBSQ HOSP IP/OBS MODERATE 35: CPT

## 2020-11-09 RX ORDER — PIPERACILLIN AND TAZOBACTAM 4; .5 G/20ML; G/20ML
3.38 INJECTION, POWDER, LYOPHILIZED, FOR SOLUTION INTRAVENOUS EVERY 8 HOURS
Refills: 0 | Status: DISCONTINUED | OUTPATIENT
Start: 2020-11-09 | End: 2020-11-11

## 2020-11-09 RX ADMIN — DEXTROSE MONOHYDRATE, SODIUM CHLORIDE, AND POTASSIUM CHLORIDE 75 MILLILITER(S): 50; .745; 4.5 INJECTION, SOLUTION INTRAVENOUS at 10:35

## 2020-11-09 RX ADMIN — PIPERACILLIN AND TAZOBACTAM 25 GRAM(S): 4; .5 INJECTION, POWDER, LYOPHILIZED, FOR SOLUTION INTRAVENOUS at 11:47

## 2020-11-09 RX ADMIN — HEPARIN SODIUM 5000 UNIT(S): 5000 INJECTION INTRAVENOUS; SUBCUTANEOUS at 21:36

## 2020-11-09 RX ADMIN — PIPERACILLIN AND TAZOBACTAM 25 GRAM(S): 4; .5 INJECTION, POWDER, LYOPHILIZED, FOR SOLUTION INTRAVENOUS at 23:10

## 2020-11-09 RX ADMIN — PIPERACILLIN AND TAZOBACTAM 25 GRAM(S): 4; .5 INJECTION, POWDER, LYOPHILIZED, FOR SOLUTION INTRAVENOUS at 16:35

## 2020-11-09 RX ADMIN — HEPARIN SODIUM 5000 UNIT(S): 5000 INJECTION INTRAVENOUS; SUBCUTANEOUS at 10:35

## 2020-11-09 RX ADMIN — Medication 110 MILLIGRAM(S): at 21:36

## 2020-11-09 RX ADMIN — Medication 110 MILLIGRAM(S): at 10:35

## 2020-11-09 NOTE — PROGRESS NOTE ADULT - ASSESSMENT
89 y.o. female with PMHx of syncope s/p PPM, CHF with left pleural effusion in the past, asthma, Iron deficiency anemia p/w progressively worsening oral intake (previously would eat pureed food but last 1-2 weeks stopped and wouldn't drink either), became confused, would not talk or respond BIBA from home and found to be in [place], sepsis with BL infected hip decubitus st IV Left worse than right based on CT, Failure to thrive, anemia, RLE edema and metabolic encephalopathy.    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    Hemodynamics improved.     Appreciate ID input.    Cont Vanco/ Doxy for now.  Day #7  F/u repeat cultures for clearance.    plastics eval: no need for debridement, c/w local wound care  Appreciate palliative input.    Possible home hospice at discharge.      #FLORA:  resolved  Likely due to ATN with sepsis and decreased po intake.    Trend with labs.    Cont IVF.      #Hypernatremia:    Resolved.  Continue IVF.  No oral intake.      #Chronic Anemia:    Hgb improved     Hemoccult stools negative.  No obvious acute bleeding.      #FTT due to advanced age/ dementia:    No oral intake to date.  Continue to reassess.  No feeding tube as per MOLST.      #Metabolic encephalopathy:    Secondary to sepsis with underlying dementia.      #Right LE DVT:    Extensive occlusive clot of Right femoral vein through right posterial tibial vein.    AC was not started due to anemia-- tried to call family to discuss.  Await call back.    Will hold off on full AC for now as presumed more chronic and patient is functional quadriplegic due to FTT/ dementia.      #Functional quadriplegia due to advanced dementia    #Dysphagia:    Dysphagia 1 with nectar liquids.  Speech f/u.      #DVT Proph:  Heparin SQ.      CODE status/ Goals of Care:  DNR/ DNI/ No feeding tube.  Appreciate palliative input.      Advised Shar (11/5) that her decubitus ulcerations will never heal and she will be at risk for recurrent sepsis not to mention ongoing dehydration without eating/ drinking and readmissions due to this.    Continued palliative f/u and goals of care.    Surrogates-- Eric:  [phone]/ Shar:  [phone]

## 2020-11-09 NOTE — PROGRESS NOTE ADULT - SUBJECTIVE AND OBJECTIVE BOX
Date of service: 20 @ 13:33    Lying in bed in NAD  Alert and confused  Has low grade fever  Dose not seem in pain    ROS:unobtainable    MEDICATIONS  (STANDING):  dextrose 5% + sodium chloride 0.45% with potassium chloride 20 mEq/L 1000 milliLiter(s) (75 mL/Hr) IV Continuous <Continuous>  doxycycline IVPB 100 milliGRAM(s) IV Intermittent every 12 hours  heparin   Injectable 5000 Unit(s) SubCutaneous every 12 hours  metoprolol succinate ER 25 milliGRAM(s) Oral daily  QUEtiapine 25 milliGRAM(s) Oral at bedtime    Vital Signs Last 24 Hrs  T(C): 36.8 (2020 08:43), Max: 37.2 (2020 16:52)  T(F): 98.2 (2020 08:43), Max: 99 (2020 16:52)  HR: 99 (2020 08:43) (99 - 102)  BP: 112/74 (2020 08:43) (112/74 - 155/96)  BP(mean): --  RR: 18 (2020 08:43) (18 - 20)  SpO2: 97% (2020 08:43) (97% - 97%)     Physical exam:    Constitutional:  No acute distress  HEENT: NC/AT, EOMI, PERRLA, conjunctivae clear  Neck: supple; thyroid not palpable  Back: no tenderness  Respiratory: respiratory effort normal; clear to auscultation  Cardiovascular: S1S2 regular, no murmurs  Abdomen: soft, not tender, not distended, positive BS  Genitourinary: no suprapubic tenderness  Lymphatic: no LN palpable  Musculoskeletal: no muscle tenderness, no joint swelling or tenderness  Right hip decubitus ulcer with palpable bone, areas of necrosis, surrounding erythema; scant discharge  Large left hip decubitus ulcer with palpable bone and partial necrotic base  Extremities: 1+ pedal edema  Neurological/ Psychiatric: confusion, moving all extremities  Skin: no rashes; no palpable lesions    Labs: reviewed                        10.9   7.53  )-----------( 182      ( 2020 08:02 )             37.2     11-07    139  |  112<H>  |  28<H>  ----------------------------<  99  3.8   |  21<L>  |  1.34<H>    Ca    7.8<L>      2020 08:45    Ferritin, Serum: 117 ng/mL (20 @ 21:11)  C-Reactive Protein, Serum: 26.74 mg/dL (20 @ 14:24)                        11.0   15.93 )-----------( 166      ( 2020 08:11 )             36.0     11-03    154<H>  |  117<H>  |  69<H>  ----------------------------<  104<H>  3.9   |  29  |  3.23<H>    Ca    8.1<L>      2020 08:11  Phos  4.7     11  Mg     2.8         TPro  7.0  /  Alb  2.0<L>  /  TBili  0.7  /  DBili  x   /  AST  20  /  ALT  12  /  AlkPhos  105  1102     LIVER FUNCTIONS - ( 2020 14:24 )  Alb: 2.0 g/dL / Pro: 7.0 gm/dL / ALK PHOS: 105 U/L / ALT: 12 U/L / AST: 20 U/L / GGT: x           Urinalysis Basic - ( 2020 14:24 )    Color: Yellow / Appearance: very cloudy / S.015 / pH: x  Gluc: x / Ketone: Negative  / Bili: Moderate / Urobili: 1 mg/dL   Blood: x / Protein: 30 mg/dL / Nitrite: Negative   Leuk Esterase: Negative / RBC: 3-5 /HPF / WBC 0-2   Sq Epi: x / Non Sq Epi: Moderate / Bacteria: Occasional      Culture - Urine (collected 2020 14:24)  Source: .Urine Clean Catch (Midstream)  Final Report (2020 14:18):    No growth    Culture - Blood (collected 2020 14:24)  Source: .Blood Blood-Peripheral  Gram Stain (2020 06:11):    Growth in anaerobic bottle: gram positive coccobacilli  Preliminary Report (2020 06:12):    Growth in anaerobic bottle: gram positive coccobacilli  Organism: Blood Culture PCR (2020 07:18)      -  Acinetobacter baumanii: Nondet      -  Candida albicans: Nondet      -  Candida glabrata: Nondet      -  Candida krusei: Nondet      -  Candida parapsilosis: Nondet      -  Candida tropicalis: Nondet      -  Coagulase negative Staphylococcus: Nondet      -  Enterobacter cloacae complex: Nondet      -  Enterococcus species: Nondet      -  Escherichia coli: Nondet      -  Haemophilus influenzae: Nondet      -  Klebsiella oxytoca: Nondet      -  Klebsiella pneumoniae: Nondet      -  Listeria monocytogenes: Nondet      -  Methicillin resistant Staphylococcus aureus (MRSA): Nondet      -  Multidrug (KPC pos) resistant organism: Nondet      -  Neisseria meningitidis: Nondet      -  Pseudomonas aeruginosa: Nondet      -  Serratia marcescens: Nondet      -  Staphylococcus aureus: Nondet      -  Streptococcus agalactiae (Group B): Nondet      -  Streptococcus pneumoniae: Nondet      -  Streptococcus pyogenes (Group A): Nondet      -  Streptococcus sp. (Not Grp A, B or S pneumoniae): Nondet      -  Vancomycin resistant Enterococcus sp.: Nondet      Method Type: PCR    Culture - Blood (collected 2020 14:24)  Source: .Blood Blood-Peripheral  Gram Stain (2020 11:00):    Growth in anaerobic bottle: Gram Negative Rods  Final Report (2020 09:12):    Growth in anaerobic bottle: Proteus mirabilis  Organism: Blood Culture PCR  Proteus mirabilis (2020 09:12)  Organism: Proteus mirabilis (2020 09:12)      -  Amikacin: S <=16      -  Ampicillin: R >16 These ampicillin results predict results for amoxicillin      -  Ampicillin/Sulbactam: S <=4/2 Enterobacter, Citrobacter, and Serratia may develop resistance during prolonged therapy (3-4 days)      -  Aztreonam: S <=4      -  Cefazolin: R >16 Enterobacter, Citrobacter, and Serratia may develop resistance during prolonged therapy (3-4 days)      -  Cefepime: S <=2      -  Cefoxitin: S <=8      -  Ceftriaxone: S <=1 Enterobacter, Citrobacter, and Serratia may develop resistance during prolonged therapy      -  Ciprofloxacin: S <=0.25      -  Ertapenem: S <=0.5      -  Gentamicin: S <=2      -  Levofloxacin: S <=0.5      -  Meropenem: S <=1      -  Piperacillin/Tazobactam: S <=8      -  Tobramycin: I 8      -  Trimethoprim/Sulfamethoxazole: R >2/38      Method Type: MELLY  Organism: Blood Culture PCR (2020 09:12)      -  Acinetobacter baumanii: Nondet      -  Candida albicans: Nondet      -  Candida glabrata: Nondet      -  Candida krusei: Nondet      -  Candida parapsilosis: Nondet      -  Candida tropicalis: Nondet      -  Coagulase negative Staphylococcus: Nondet      -  Enterobacter cloacae complex: Nondet      -  Enterococcus species: Nondet      -  Escherichia coli: Nondet      -  Haemophilus influenzae: Nondet      -  Klebsiella oxytoca: Nondet      -  Klebsiella pneumoniae: Nondet      -  Listeria monocytogenes: Nondet      -  Methicillin resistant Staphylococcus aureus (MRSA): Nondet      -  Multidrug (KPC pos) resistant organism: Nondet      -  Neisseria meningitidis: Nondet      -  Pseudomonas aeruginosa: Nondet      -  Serratia marcescens: Nondet      -  Staphylococcus aureus: Nondet      -  Streptococcus agalactiae (Group B): Nondet      -  Streptococcus pneumoniae: Nondet      -  Streptococcus pyogenes (Group A): Nondet      -  Streptococcus sp. (Not Grp A, B or S pneumoniae): Nondet      -  Vancomycin resistant Enterococcus sp.: Nondet      Method Type: PCR    COVID-19 PCR: NotDetec (20 @ 15:05)    Radiology: all available radiological tests reviewed    < from: CT Abdomen and Pelvis No Cont (20 @ 16:12) >  Deep bilateral hip decubitus ulcers with exposure of the underlying greater trochanters. No definite underlying bony erosion or destruction. Small amount of gas in the left greater trochanteric bursa suggestive of bursitis.    < end of copied text >      Advanced directives addressed: full resuscitation

## 2020-11-09 NOTE — PROGRESS NOTE ADULT - SUBJECTIVE AND OBJECTIVE BOX
89 y.o. female with PMHx of syncope s/p PPM, CHF with left pleural effusion in the past, asthma, Iron deficiency anemia p/w progressively worsening oral intake (previously would eat pureed food but last 1-2 weeks stopped and wouldn't drink either), became confused, would not talk or respond BIBA from home and found to be in [place], sepsis with BL infected hip decubitus st IV Left worse than right based on CT, Failure to thrive, anemia, RLE edema and metabolic encephalopathy.     11/3:  Pt seen.  Awake.  Moaning.  Having wound care assessment.  Trying to bite/grab nurses hand.  Confused.  Nonverbal.    11/4:  Pt seen.  Moaning.  Itching.  Not eating.    11/5:  Pt seen.  Awake.  Itching.  Moaning.    11/6:  Pt seen.  Unchanged.  Moaning.  Itching.  Not eating.  Unable to take oral meds.    11.7: confused, moaning   11/08/20: Patient seen and examined, confused.   11/09/20: Patient seen and examined, more awake and alert today, confused.     Review of system: unable to obtain due to dementia        Vital Signs Last 24 Hrs  T(C): 36.8 (09 Nov 2020 08:43), Max: 37.2 (08 Nov 2020 16:52)  T(F): 98.2 (09 Nov 2020 08:43), Max: 99 (08 Nov 2020 16:52)  HR: 99 (09 Nov 2020 08:43) (99 - 102)  BP: 112/74 (09 Nov 2020 08:43) (112/74 - 155/96)  BP(mean): --  RR: 18 (09 Nov 2020 08:43) (18 - 20)  SpO2: 97% (09 Nov 2020 08:43) (97% - 97%)      PHYSICAL EXAM:  GENERAL: distress.  moaning.    HEAD:  Atraumatic, Normocephalic  EYES: EOMI, PERRLA  HEENT: dry MM  NECK: Supple, No JVD  NERVOUS SYSTEM: awake.  confused.  dementia.    CHEST/LUNG: Clear to auscultation bilaterally  HEART: Regular rate and rhythm; No murmurs, rubs, or gallops  ABDOMEN: Soft, Nontender, Nondistended; Bowel sounds present  GENITOURINARY- Voiding, no palpable bladder  EXTREMITIES:  extensive edema right leg.    MUSCULOSKELTAL-muscle wasting  SKIN-multiple areas of skin break down on hips/ legs.  Exposed bone on bilateral hips with large ulcerations.                                                9.7    7.47  )-----------( 229      ( 09 Nov 2020 10:18 )             32.1     09 Nov 2020 08:38    141    |  112    |  19     ----------------------------<  112    4.1     |  21     |  1.14     Ca    7.8        09 Nov 2020 08:38      Culture Results:   Growth in anaerobic bottle: gram positive coccobacilli Culture Results:   Growth in anaerobic bottle: Proteus mirabilis     CT Abdomen and Pelvis No Cont (11.02.20 @ 16:12)   IMPRESSION:  Deep bilateral hip decubitus ulcers with exposure of the underlying greater trochanters. No definite underlying bony erosion or destruction. Small amount of gas in the left greater trochanteric bursa suggestive of bursitis.  < end of copied text >    MEDICATIONS  (STANDING):  dextrose 5% + sodium chloride 0.45% with potassium chloride 20 mEq/L 1000 milliLiter(s) (75 mL/Hr) IV Continuous <Continuous>  doxycycline IVPB 100 milliGRAM(s) IV Intermittent every 12 hours  heparin   Injectable 5000 Unit(s) SubCutaneous every 12 hours  metoprolol succinate ER 25 milliGRAM(s) Oral daily  piperacillin/tazobactam IVPB.. 3.375 Gram(s) IV Intermittent every 12 hours  QUEtiapine 25 milliGRAM(s) Oral at bedtime    MEDICATIONS  (PRN):  acetaminophen  IVPB .. 1000 milliGRAM(s) IV Intermittent once PRN Mild Pain (1 - 3), Moderate Pain (4 - 6), Severe Pain (7 - 10)  acetaminophen  IVPB .. 1000 milliGRAM(s) IV Intermittent once PRN Mild Pain (1 - 3), Moderate Pain (4 - 6), Severe Pain (7 - 10)  acetaminophen  IVPB .. 1000 milliGRAM(s) IV Intermittent once PRN Mild Pain (1 - 3), Moderate Pain (4 - 6), Severe Pain (7 - 10)

## 2020-11-09 NOTE — PROGRESS NOTE ADULT - ASSESSMENT
89 y/o female with h/o syncope s/p PPM, CHF with left pleural effusion in the past, asthma, iron deficiency anemia was admitted on 11/2 for poorly healing hip ulcerations and increased confusion. She was noted with progressively worsening oral intake (previously would eat pureed food but last 1-2 weeks PTA, she stopped and wouldn't drink either), increased. confused, would not talk or respond> IN ER she was noted with high creatinine and b/l hip decubitus st IV Left worse than right. She received zosyn IV and vancomycin IV.     1. Sepsis with PRMI and GP coccobacilli. B/l hip decubitus ulcers. B/l hips acute on chronic OM with surrounding cellulitis. ARF improving.   -fever is down  -encephalopathy is persistent  -noted with two different organisms in blood culture; the lab is having difficulty isolating the GP coccobacilli isolated in blood culture  -leukocytosis resolving  -renal function is improving  -on zosyn 3 gm IV q12h and doxycycline 100 mg IV q12h # 7  -tolerating abx well so far; no side effects noted  -increase zosyn to q8h  -local wound care  -repeat BC x 2 collected  -aspiration precautions  -continue abx coverage  -monitor temps  -f/u CBC  -supportive care  2. Other issues:   -care per medicine

## 2020-11-10 ENCOUNTER — TRANSCRIPTION ENCOUNTER (OUTPATIENT)
Age: 85
End: 2020-11-10

## 2020-11-10 PROCEDURE — 99233 SBSQ HOSP IP/OBS HIGH 50: CPT

## 2020-11-10 PROCEDURE — 99232 SBSQ HOSP IP/OBS MODERATE 35: CPT

## 2020-11-10 RX ADMIN — Medication 110 MILLIGRAM(S): at 11:01

## 2020-11-10 RX ADMIN — HEPARIN SODIUM 5000 UNIT(S): 5000 INJECTION INTRAVENOUS; SUBCUTANEOUS at 11:02

## 2020-11-10 RX ADMIN — Medication 110 MILLIGRAM(S): at 22:44

## 2020-11-10 RX ADMIN — PIPERACILLIN AND TAZOBACTAM 25 GRAM(S): 4; .5 INJECTION, POWDER, LYOPHILIZED, FOR SOLUTION INTRAVENOUS at 18:29

## 2020-11-10 RX ADMIN — HEPARIN SODIUM 5000 UNIT(S): 5000 INJECTION INTRAVENOUS; SUBCUTANEOUS at 22:45

## 2020-11-10 RX ADMIN — PIPERACILLIN AND TAZOBACTAM 25 GRAM(S): 4; .5 INJECTION, POWDER, LYOPHILIZED, FOR SOLUTION INTRAVENOUS at 05:55

## 2020-11-10 NOTE — PROGRESS NOTE ADULT - ASSESSMENT
89 y.o. female with PMHx of syncope s/p PPM, CHF with left pleural effusion in the past, asthma, Iron deficiency anemia p/w progressively worsening oral intake (previously would eat pureed food but last 1-2 weeks stopped and wouldn't drink either), became confused, would not talk or respond BIBA from home and found to be in [place], sepsis with BL infected hip decubitus st IV Left worse than right based on CT, Failure to thrive, anemia, RLE edema and metabolic encephalopathy.    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    Hemodynamics improved.     Appreciate ID input.    Cont Vanco/ Doxy for now.  Day #8  repeat blood culture negative   plastics eval: no need for debridement, c/w local wound care  Appreciate palliative input.  - home hospice.   Possible home hospice at discharge.      #FLORA:  resolved  Likely due to ATN with sepsis and decreased po intake.    Trend with labs.      #Hypernatremia:    Resolved.  Continue IVF.  No oral intake.      #Chronic Anemia:    Hgb improved     s/p 2 units PRBC   Hemoccult stools negative.  No obvious acute bleeding.      #FTT due to advanced age/ dementia:    No oral intake to date.  Continue to reassess.  No feeding tube as per MOLST.      #Metabolic encephalopathy:    Secondary to sepsis with underlying dementia.      #Right LE DVT:    Extensive occlusive clot of Right femoral vein through right posterial tibial vein.    AC was not started due to anemia   Will hold off on full AC for now as presumed more chronic and patient is functional quadriplegic due to FTT/ dementia.      #Functional quadriplegia due to advanced dementia    #Dysphagia:    Dysphagia 1 with nectar liquids.  Speech f/u.      #DVT Proph:  Heparin SQ.      CODE status/ Goals of Care:  DNR/ DNI/ No feeding tube.  Appreciate palliative input.      Advised Shar (11/5) that her decubitus ulcerations will never heal and she will be at risk for recurrent sepsis not to mention ongoing dehydration without eating/ drinking and readmissions due to this.    Continued palliative f/u and goals of care.    Surrogates-- Eric:  [phone]/ Shar:  [phone]    dispo home hospice tomorrow. spoke with Daughter Eric- updated on POC and agrees to plan

## 2020-11-10 NOTE — PROGRESS NOTE ADULT - SUBJECTIVE AND OBJECTIVE BOX
89 y.o. female with PMHx of syncope s/p PPM, CHF with left pleural effusion in the past, asthma, Iron deficiency anemia p/w progressively worsening oral intake (previously would eat pureed food but last 1-2 weeks stopped and wouldn't drink either), became confused, would not talk or respond BIBA from home and found to be in FLORA, sepsis with BL infected hip decubitus st IV Left worse than right based on CT, Failure to thrive, anemia, RLE edema and metabolic encephalopathy.     11/3:  Pt seen.  Awake.  Moaning.  Having wound care assessment.  Trying to bite/grab nurses hand.  Confused.  Nonverbal.    11/4:  Pt seen.  Moaning.  Itching.  Not eating.    11/5:  Pt seen.  Awake.  Itching.  Moaning.    11/6:  Pt seen.  Unchanged.  Moaning.  Itching.  Not eating.  Unable to take oral meds.    11.7: confused, moaning   11/08/20: Patient seen and examined, confused.   11/09/20: Patient seen and examined, more awake and alert today, confused.   11/10/20: pt seen and examined. confused and moaning. not eating. picking at skin and nurses     Review of system: unable to obtain due to dementia        Vital Signs Last 24 Hrs  T(C): 36.2 (10 Nov 2020 15:46), Max: 36.3 (10 Nov 2020 08:09)  T(F): 97.2 (10 Nov 2020 15:46), Max: 97.3 (10 Nov 2020 08:09)  HR: 94 (10 Nov 2020 15:46) (94 - 97)  BP: 127/60 (10 Nov 2020 15:46) (127/60 - 157/63)  BP(mean): --  RR: 18 (10 Nov 2020 15:46) (17 - 18)  SpO2: 98% (10 Nov 2020 15:46) (97% - 98%)      PHYSICAL EXAM:  GENERAL: distress.  moaning.    HEAD:  Atraumatic, Normocephalic  EYES: EOMI, PERRLA  HEENT: dry MM  NECK: Supple, No JVD  NERVOUS SYSTEM: awake.  confused.  dementia.    CHEST/LUNG: Clear to auscultation bilaterally  HEART: Regular rate and rhythm; No murmurs, rubs, or gallops  ABDOMEN: Soft, Nontender, Nondistended; Bowel sounds present  GENITOURINARY- Voiding, no palpable bladder  EXTREMITIES:  extensive edema right leg.    MUSCULOSKELTAL-muscle wasting  SKIN-multiple areas of skin break down on hips/ legs.  Exposed bone on bilateral hips with large ulcerations.                                                9.7    7.47  )-----------( 229      ( 09 Nov 2020 10:18 )             32.1     09 Nov 2020 08:38    141    |  112    |  19     ----------------------------<  112    4.1     |  21     |  1.14     Ca    7.8        09 Nov 2020 08:38      Culture Results:   Growth in anaerobic bottle: gram positive coccobacilli Culture Results:   Growth in anaerobic bottle: Proteus mirabilis     CT Abdomen and Pelvis No Cont (11.02.20 @ 16:12)   IMPRESSION:  Deep bilateral hip decubitus ulcers with exposure of the underlying greater trochanters. No definite underlying bony erosion or destruction. Small amount of gas in the left greater trochanteric bursa suggestive of bursitis.  < end of copied text >    MEDICATIONS  (STANDING):  dextrose 5% + sodium chloride 0.45% with potassium chloride 20 mEq/L 1000 milliLiter(s) (75 mL/Hr) IV Continuous <Continuous>  doxycycline IVPB 100 milliGRAM(s) IV Intermittent every 12 hours  heparin   Injectable 5000 Unit(s) SubCutaneous every 12 hours  metoprolol succinate ER 25 milliGRAM(s) Oral daily  piperacillin/tazobactam IVPB.. 3.375 Gram(s) IV Intermittent every 12 hours  QUEtiapine 25 milliGRAM(s) Oral at bedtime    MEDICATIONS  (PRN):  acetaminophen  IVPB .. 1000 milliGRAM(s) IV Intermittent once PRN Mild Pain (1 - 3), Moderate Pain (4 - 6), Severe Pain (7 - 10)  acetaminophen  IVPB .. 1000 milliGRAM(s) IV Intermittent once PRN Mild Pain (1 - 3), Moderate Pain (4 - 6), Severe Pain (7 - 10)  acetaminophen  IVPB .. 1000 milliGRAM(s) IV Intermittent once PRN Mild Pain (1 - 3), Moderate Pain (4 - 6), Severe Pain (7 - 10)

## 2020-11-10 NOTE — DISCHARGE NOTE PROVIDER - NSDCMRMEDTOKEN_GEN_ALL_CORE_FT
furosemide 40 mg oral tablet: 1 tab(s) orally once a day  metoprolol tartrate 25 mg oral tablet: 1 tab(s) orally once a day  traZODone 100 mg oral tablet: 1 tab(s) orally once a day (at bedtime)  ***pt was going to stop this and start risperidone 0.25mg but didn&#x27;t switch yet***

## 2020-11-10 NOTE — DISCHARGE NOTE PROVIDER - HOSPITAL COURSE
89 y/o female presented on 11/2 for failure to thrive/decreased PO intake found to be anemic/septic/FLORA w/ bilat infected decubitus ulcers.   pt given IV fluids, and 2 units of PRBC. pt was given vanco x1 and maintained on zosyn and doxycycline per ID reccs. pt w/ + blood cultures GPC and proteus.   pt was evaled by plastics for possible debridement however pt would have high mortality rate, local wound care and off loading recommended.   pt evaled by dietian and found to be severely malnourished.   palliative care consulted on pt and was decided that pt be home hospice once acute infectious process is cleared.   2nd set of blood cultures negative at this time and pt would be best served at home hospice due to limited medical interventions from C conversation.     subjective:   11/10- pt seen and examined at bedside. non verbal at present moaning.     ROS   unable to obtain due to advanced dementia and illness.     Vital Signs Last 24 Hrs  T(C): 36.3 (10 Nov 2020 08:09), Max: 36.3 (10 Nov 2020 08:09)  T(F): 97.3 (10 Nov 2020 08:09), Max: 97.3 (10 Nov 2020 08:09)  HR: 97 (10 Nov 2020 08:09) (97 - 97)  BP: 157/63 (10 Nov 2020 08:09) (157/63 - 157/63)  BP(mean): --  RR: 17 (10 Nov 2020 08:09) (17 - 17)  SpO2: 97% (10 Nov 2020 08:09) (97% - 97%)    PHYSICAL EXAM:  GENERAL: distress.  moaning.    HEAD:  Atraumatic, Normocephalic  EYES: EOMI, PERRLA  HEENT: dry MM  NECK: Supple, No JVD  NERVOUS SYSTEM: awake.  confused.  dementia.    CHEST/LUNG: Clear to auscultation bilaterally  HEART: Regular rate and rhythm; No murmurs, rubs, or gallops  ABDOMEN: Soft, Nontender, Nondistended; Bowel sounds present  GENITOURINARY- Voiding, no palpable bladder  EXTREMITIES:  extensive edema right leg.    MUSCULOSKELTAL-muscle wasting  SKIN-multiple areas of skin break down on hips/ legs.  Exposed bone on bilateral hips with large ulcerations.        LABS                         9.7    7.47  )-----------( 229      ( 09 Nov 2020 10:18 )             32.1     11-09    141  |  112<H>  |  19  ----------------------------<  112<H>  4.1   |  21<L>  |  1.14    Ca    7.8<L>      09 Nov 2020 08:38     Culture Results:   Growth in anaerobic bottle: gram positive coccobacilli Culture Results:   Growth in anaerobic bottle: Proteus mirabilis    CT Abdomen and Pelvis No Cont (11.02.20 @ 16:12)   IMPRESSION:  Deep bilateral hip decubitus ulcers with exposure of the underlying greater trochanters. No definite underlying bony erosion or destruction. Small amount of gas in the left greater trochanteric bursa suggestive of bursitis.  < end of copied text >     #Sepsis with Proteus and GP coccobacilli bacteremia secondary to extensive decubitus ulcerations/ acute on chronic hip OM with cellulitis:    Present on admission.    Hemodynamics improved.     Appreciate ID input.    Cont Vanco/ Doxy for now.  Day #7  repeat BC negative   plastics eval: no need for debridement, c/w local wound care  palliative focus  Possible home hospice at discharge.      #FLORA:  resolved  Likely due to ATN with sepsis and decreased po intake.    Trend with labs.    palliative focus.    #Hypernatremia:    Resolved.  Continue IVF.  No oral intake.      #Chronic Anemia:    s/p 2 units prbc  Hgb improved     Hemoccult stools negative.  No obvious acute bleeding.      #FTT due to advanced age/ dementia:    No oral intake to date.  Continue to reassess.  No feeding tube as per MOLST.      #Metabolic encephalopathy:    Secondary to sepsis with underlying dementia.      #Right LE DVT:    Extensive occlusive clot of Right femoral vein through right posterial tibial vein   Will hold off on full AC for now as presumed more chronic and patient is functional quadriplegic due to FTT/ dementia.      #Functional quadriplegia due to advanced dementia    #Dysphagia:    Dysphagia 1 with nectar liquids.  Speech f/u.      #DVT Proph:  Heparin SQ. 89 y/o female presented on 11/2 for failure to thrive/decreased PO intake found to be anemic/septic/FLORA w/ bilat infected decubitus ulcers.   pt given IV fluids, and 2 units of PRBC. pt was given vanco x1 and maintained on zosyn and doxycycline per ID reccs. pt w/ + blood cultures GPC and proteus.   pt was evaled by plastics for possible debridement however pt would have high mortality rate, local wound care and off loading recommended.   pt evaled by dietian and found to be severely malnourished.   palliative care consulted on pt and was decided that pt be home hospice once acute infectious process is cleared.   2nd set of blood cultures negative at this time and pt would be best served at home hospice due to limited medical interventions from GOC conversation.     subjective:   11/10- pt seen and examined at bedside. non verbal at present moaning.     ROS   unable to obtain due to advanced dementia and illness.     Vital Signs Last 24 Hrs  T(C): 36.3 (10 Nov 2020 08:09), Max: 36.3 (10 Nov 2020 08:09)  T(F): 97.3 (10 Nov 2020 08:09), Max: 97.3 (10 Nov 2020 08:09)  HR: 97 (10 Nov 2020 08:09) (97 - 97)  BP: 157/63 (10 Nov 2020 08:09) (157/63 - 157/63)  BP(mean): --  RR: 17 (10 Nov 2020 08:09) (17 - 17)  SpO2: 97% (10 Nov 2020 08:09) (97% - 97%)    PHYSICAL EXAM:  GENERAL: distress.  moaning.    HEAD:  Atraumatic, Normocephalic  EYES: EOMI, PERRLA  HEENT: dry MM  NECK: Supple, No JVD  NERVOUS SYSTEM: awake.  confused.  dementia.    CHEST/LUNG: Clear to auscultation bilaterally  HEART: Regular rate and rhythm; No murmurs, rubs, or gallops  ABDOMEN: Soft, Nontender, Nondistended; Bowel sounds present  GENITOURINARY- Voiding, no palpable bladder  EXTREMITIES:  extensive edema right leg.    MUSCULOSKELTAL-muscle wasting  SKIN-multiple areas of skin break down on hips/ legs.  Exposed bone on bilateral hips with large ulcerations.         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    Hemodynamics improved.     Appreciate ID input.    Cont Vanco/ Doxy for now.  Day #7  repeat BC negative   plastics eval: no need for debridement, c/w local wound care  palliative focus  Possible home hospice at discharge.      #FLORA:  resolved  Likely due to ATN with sepsis and decreased po intake.    Trend with labs.    palliative focus.    #Hypernatremia:    Resolved with IV fluid  No oral intake.      #Chronic Anemia:    s/p 2 units prbc  Hgb improved     Hemoccult stools negative.  No obvious acute bleeding.      #FTT due to advanced age/ dementia:    No oral intake to date.   No feeding tube as per MOL[facility].      #Metabolic encephalopathy:    Secondary to sepsis with underlying dementia.      #Right LE DVT:    Extensive occlusive clot of Right femoral vein through right posterial tibial vein   Will hold off on full AC for now as presumed more chronic and patient is functional quadriplegic due to FTT/ dementia.      #Functional quadriplegia due to advanced dementia    #Dysphagia:    Dysphagia 1 with nectar liquids.  Speech f/u.      Attending note:    Patient seen and examined with KUSUM Collins  Case reviewed and discussed with her  Necessary changes were made  Agree with her assessment and plan 89 y/o female presented on 11/2 for failure to thrive/decreased PO intake found to be anemic/septic/FLORA w/ bilat infected decubitus ulcers.   pt given IV fluids, and 2 units of PRBC. pt was given vanco x1 and maintained on zosyn and doxycycline per ID reccs. pt w/ + blood cultures GPC and proteus.   pt was evaled by plastics for possible debridement however pt would have high mortality rate, local wound care and off loading recommended.   pt evaled by dietian and found to be severely malnourished.   palliative care consulted on pt and was decided that pt be home hospice once acute infectious process is cleared.   2nd set of blood cultures negative at this time and pt would be best served at home hospice due to limited medical interventions from GOC conversation.     subjective:   11/10- pt seen and examined at bedside. non verbal at present moaning.     ROS   unable to obtain due to advanced dementia and illness.     Vital Signs Last 24 Hrs  T(C): 36.4 (11 Nov 2020 11:03), Max: 36.4 (10 Nov 2020 22:41)  T(F): 97.5 (11 Nov 2020 11:03), Max: 97.5 (10 Nov 2020 22:41)  HR: 97 (10 Nov 2020 22:41) (94 - 97)  BP: 178/70 (11 Nov 2020 11:03) (127/60 - 178/70)  BP(mean): --  RR: 18 (11 Nov 2020 11:03) (18 - 18)  SpO2: 98% (11 Nov 2020 11:03) (98% - 100%)      PHYSICAL EXAM:  GENERAL: distress.  moaning.    HEAD:  Atraumatic, Normocephalic  EYES: EOMI, PERRLA  HEENT: dry MM  NECK: Supple, No JVD  NERVOUS SYSTEM: awake.  confused.  dementia.    CHEST/LUNG: Clear to auscultation bilaterally  HEART: Regular rate and rhythm; No murmurs, rubs, or gallops  ABDOMEN: Soft, Nontender, Nondistended; Bowel sounds present  GENITOURINARY- Voiding, no palpable bladder  EXTREMITIES:  extensive edema right leg.    MUSCULOSKELTAL-muscle wasting  SKIN-multiple areas of skin break down on hips/ legs.  Exposed bone on bilateral hips with large ulcerations.         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    Hemodynamics improved.     Appreciate ID input.    On Vanco/ Doxy for now.  No more antibiotic  repeat BC negative   plastics eval: no need for debridement, c/w local wound care  palliative focus  home hospice at discharge.      #FLORA:  resolved  Likely due to ATN with sepsis and decreased po intake.    Trend with labs.    palliative focus.    #Hypernatremia:    Resolved with IV fluid  No oral intake.      #Chronic Anemia:    s/p 2 units prbc  Hgb improved     Hemoccult stools negative.  No obvious acute bleeding.      #FTT due to advanced age/ dementia:    No oral intake to date.   No feeding tube as per MOLST.      #Metabolic encephalopathy:    Secondary to sepsis with underlying dementia.      #Right LE DVT:    Extensive occlusive clot of Right femoral vein through right posterial tibial vein   Will hold off on full AC for now as presumed more chronic and patient is functional quadriplegic due to FTT/ dementia.      #Functional quadriplegia due to advanced dementia    #Dysphagia:    Dysphagia 1 with nectar liquids.  Speech f/u.      Attending note:    Patient seen and examined with UKSUM Collins  Case reviewed and discussed with her  Necessary changes were made  Agree with her assessment and plan  Home on home hospice  Spent more than 30 min to prepare the discharge

## 2020-11-10 NOTE — DISCHARGE NOTE PROVIDER - NSDCHHHOMEBOUND_GEN_ALL_CORE
Pain greater than 7 on scale of 10 on ambulation/Requires supervison due to deteriorating mental status.../Bed bound/Fall risk

## 2020-11-10 NOTE — PROGRESS NOTE ADULT - ASSESSMENT
89y old Female coming from home with hx of Dementia (FAST 7c), syncope s/p PPM, CHF with left pleural effusion in the past, asthma, Iron deficiency anemia, admitted 11/2 for progressively worsening oral intake (previously would eat pureed food but last 1-2 weeks stopped and wouldn't drink either), increasing confusion, and decreased responsiveness. Found here to have leukocytosis (sepsis, with elevated lactate 4.3), FLORA (Cr>3), negative CXR, but CT abd/pelvis showing deep bl hip decubiti. Labs later showing GNR in blood cx. Palliative Care consulted to assist with establishing GOC.     1) Pain  - pt moaning at times  - c/w doses of IV Tylenol in hopes of avoiding any negative effects of already fragile mentation  - dc'd IV dilaudid as moaning is a chronic issue as per family and pt doing just fine with IV Tylenol. We would thus prefer to keep pain regimen conservative so as not to compromise already tenuous mental state    2) Pruritis  - as per family this is a chronic issue, being followed outpt by allergist. Family bringing in creme that works for pt at home  - predates opioid admin  - agree with conservative measures including mittens    3) Sepsis: Decubitus   - bl decubitus ulcers on hips and other areas of skin breakdown  - lactate improved with IVF and abx  - blood cx with GNR result as of today  - ID notes appreciated- suggesting IV abx and inquiry into possible surgical debridement  - wound care notes also appreciated- documenting several areas of skin breakdown including bl stage 4 pressure injuries, R knee stage 3, sacral stage 3, all with poor chance of healing due to nutritional status and comorbidities. Also suggesting surgical consult for possible debridement  - family would be interested in surgical debridement, now s/p this  - awaiting repeat cultures for clearance    4) FLORA  - likely due to prerenal cause (poor intake) +/- sepsis  - improved back to normal with IVF  - avoid nephrotoxic agents as able  - US negative for hydro    5) DVT  - R extensive DVT likely to be chronic  - according to notes, considering age, comorbidities and anemia, holding off on anticoagulation due to risk    6) Anemia  - chronic iron deficiency  - s/p 2 U PRBCs with appropriate improvement    7) AMS/Dementia  - underlying hx of dementia, with superimposed hypoactive delirium from acute metabolic derangements  - fall and aspiration precautions  - daughter suggested hx of poor sleep and calling out  - able to swallow after swallow eval thus added Seroquel 25 qhs; if unable to swallow can give low dose ativan 0.25 mg IV q6h prn    8) Debility  - PPS<40%  - dependent for ADLs  - nutrition notes appreciated- severe muscle and fat wasting, severe protein calorie malnutrition  - speech and swallow eval appreciated- overt aspiration with thin liquids, dysphagia 1, recommends against peg    9) Prognosis  - poor  - in setting of advanced dementia, advanced age, dependence for ADLs, FTT, multiple areas of skin breakdown, severe protein calorie malnutrition, PPS<40%, albumin 2.0, pt would fit criteria for hospice. Family on board with this and dc plan is for home with hospice after all acute interventions and when pt deemed medically stable    10) GOC/Advanced Directives  - pt does not have capacity for decision making  - no HCP on file (what is in [employer] is an incomplete form and family confirms no official proxy): thus children serving as surrogate decision makers: Eric Barrera [phone]  - MOLST form completed 11/4- DNR, limited, DNI, do not send, no feeding tube, trial of IVF, use abx  - GOC conversation held 11/4- plan for all interventions including possible surgery to get pt well while here, but abovementioned limits now in place, and ultimate plan for home hospice after pt fully stabilized. See GOC note for additional details. D/w team who is awaiting blood cultures to demonstrate medical stability for dc planning.    Thank you for including us in Ms. Salazar's care. Will continue to follow with you.    Esvin Huston MD  Palliative Care Attending

## 2020-11-10 NOTE — PROGRESS NOTE ADULT - SUBJECTIVE AND OBJECTIVE BOX
HPI: Pt seen and examined this am in follow up for sx. Pt remains confused (as is her baseline), but awakens easily and allows exam. As per team, no issues, just awaiting repeat cultures.       PAIN: no nonverbal signs of pain    DYSPNEA: no nonverbal signs of dyspnea      ROS:  Unable to gather 2/2 to dementia    PHYSICAL EXAM:    Vital Signs Last 24 Hrs  T(C): 36.3 (10 Nov 2020 08:09), Max: 36.3 (10 Nov 2020 08:09)  T(F): 97.3 (10 Nov 2020 08:09), Max: 97.3 (10 Nov 2020 08:09)  HR: 97 (10 Nov 2020 08:09) (97 - 97)  BP: 157/63 (10 Nov 2020 08:09) (157/63 - 157/63)  BP(mean): --  RR: 17 (10 Nov 2020 08:09) (17 - 17)  SpO2: 97% (10 Nov 2020 08:09) (97% - 97%)  Daily     Daily Weight in k.2 (10 Nov 2020 06:03)      PPSV2:  20-30 %  FAST: 7c    General: Elderly female, cachectic, ill-appearing, scratching skin at times but mittens on now  Mental Status: AOx1 (responds to her name, otherwise unable to gather)  HEENT: dmm, + temporal wasting  Lungs: clear  Cardiac: + s1 s2 rrr  GI: soft nt nd + bs  : incontinent  MSK/skin: contracted, sitting on legs, excoriations on limbs,  multiple areas of skin breakdown over bl hips, R knee, muscle and fat wasting throughout  Neuro: limited by confusion    LABS:                        9.7    7.47  )-----------( 229      ( 2020 10:18 )             32.1     11-09    141  |  112<H>  |  19  ----------------------------<  112<H>  4.1   |  21<L>  |  1.14    Ca    7.8<L>      2020 08:38        Albumin:     Allergies    aspirin (Other (Severe))    Intolerances      MEDICATIONS  (STANDING):  dextrose 5% + sodium chloride 0.45% with potassium chloride 20 mEq/L 1000 milliLiter(s) (75 mL/Hr) IV Continuous <Continuous>  doxycycline IVPB 100 milliGRAM(s) IV Intermittent every 12 hours  heparin   Injectable 5000 Unit(s) SubCutaneous every 12 hours  metoprolol succinate ER 25 milliGRAM(s) Oral daily  piperacillin/tazobactam IVPB.. 3.375 Gram(s) IV Intermittent every 8 hours  QUEtiapine 25 milliGRAM(s) Oral at bedtime    MEDICATIONS  (PRN):  acetaminophen  IVPB .. 1000 milliGRAM(s) IV Intermittent once PRN Mild Pain (1 - 3), Moderate Pain (4 - 6), Severe Pain (7 - 10)  acetaminophen  IVPB .. 1000 milliGRAM(s) IV Intermittent once PRN Mild Pain (1 - 3), Moderate Pain (4 - 6), Severe Pain (7 - 10)  acetaminophen  IVPB .. 1000 milliGRAM(s) IV Intermittent once PRN Mild Pain (1 - 3), Moderate Pain (4 - 6), Severe Pain (7 - 10)

## 2020-11-10 NOTE — DISCHARGE NOTE PROVIDER - NSDCCPCAREPLAN_GEN_ALL_CORE_FT
PRINCIPAL DISCHARGE DIAGNOSIS  Diagnosis: Decubitus ulcers  Assessment and Plan of Treatment: - you were found to have deep and infected ulcers on both your hips.   - please off load pressure to promote healing.      SECONDARY DISCHARGE DIAGNOSES  Diagnosis: End of life care  Assessment and Plan of Treatment: You are being discharged home with Hospice services and it is recommended to focus on comfort measures and supportive care. Continue with medications prescribed for pain and other symptom control. If you have questions or concerns you may contact the Hospice service line by calling [phone].    Diagnosis: Sepsis  Assessment and Plan of Treatment:

## 2020-11-10 NOTE — DISCHARGE NOTE PROVIDER - CARE PROVIDER_API CALL
Dominick Dodson)  Internal Medicine; Pulmonary Disease  [address] [place] [address]  Phone: [phone]  Fax: [phone]  Follow Up Time:

## 2020-11-11 ENCOUNTER — TRANSCRIPTION ENCOUNTER (OUTPATIENT)
Age: 85
End: 2020-11-11

## 2020-11-11 VITALS
SYSTOLIC BLOOD PRESSURE: 178 MMHG | TEMPERATURE: 98 F | DIASTOLIC BLOOD PRESSURE: 70 MMHG | HEART RATE: 94 BPM | RESPIRATION RATE: 18 BRPM | OXYGEN SATURATION: 98 %

## 2020-11-11 PROCEDURE — 99233 SBSQ HOSP IP/OBS HIGH 50: CPT

## 2020-11-11 PROCEDURE — 99239 HOSP IP/OBS DSCHRG MGMT >30: CPT

## 2020-11-11 RX ORDER — TRAZODONE HCL 50 MG
1 TABLET ORAL
Qty: 0 | Refills: 0 | DISCHARGE

## 2020-11-11 RX ADMIN — HEPARIN SODIUM 5000 UNIT(S): 5000 INJECTION INTRAVENOUS; SUBCUTANEOUS at 09:35

## 2020-11-11 RX ADMIN — PIPERACILLIN AND TAZOBACTAM 25 GRAM(S): 4; .5 INJECTION, POWDER, LYOPHILIZED, FOR SOLUTION INTRAVENOUS at 10:53

## 2020-11-11 RX ADMIN — Medication 110 MILLIGRAM(S): at 09:35

## 2020-11-11 RX ADMIN — PIPERACILLIN AND TAZOBACTAM 25 GRAM(S): 4; .5 INJECTION, POWDER, LYOPHILIZED, FOR SOLUTION INTRAVENOUS at 02:02

## 2020-11-11 RX ADMIN — DEXTROSE MONOHYDRATE, SODIUM CHLORIDE, AND POTASSIUM CHLORIDE 75 MILLILITER(S): 50; .745; 4.5 INJECTION, SOLUTION INTRAVENOUS at 09:36

## 2020-11-11 NOTE — PROGRESS NOTE ADULT - NUTRITIONAL ASSESSMENT
This patient has been assessed with a concern for Malnutrition and has been determined to have a diagnosis/diagnoses of Severe protein-calorie malnutrition and Underweight/BMI < 19.    This patient is being managed with:   Diet Dysphagia 1 Pureed-Nectar Consistency Fluid-  Entered: Nov  3 2020  5:36PM    

## 2020-11-11 NOTE — DISCHARGE NOTE NURSING/CASE MANAGEMENT/SOCIAL WORK - PATIENT PORTAL LINK FT
You can access the FollowMyHealth Patient Portal offered by [facility] by registering at the following website: [email or web address]. By joining [facility]’s FollowMyHealth portal, you will also be able to view your health information using other applications (apps) compatible with our system.

## 2020-11-11 NOTE — PROGRESS NOTE ADULT - SUBJECTIVE AND OBJECTIVE BOX
HPI: Pt seen and examined this am in follow up for sx. Pt awake and able to say good morning, but that is all that is clear. She allowed exam and seemed very comfortable.     PAIN: no nonverbal signs of pain    DYSPNEA: no nonverbal signs of dyspnea    ROS:  Unable to gather 2/2 to dementia    PHYSICAL EXAM:    Vital Signs Last 24 Hrs  T(C): 36.4 (10 Nov 2020 22:41), Max: 36.4 (10 Nov 2020 22:41)  T(F): 97.5 (10 Nov 2020 22:41), Max: 97.5 (10 Nov 2020 22:41)  HR: 97 (10 Nov 2020 22:41) (94 - 97)  BP: 159/89 (10 Nov 2020 22:41) (127/60 - 159/89)  BP(mean): --  RR: 18 (10 Nov 2020 22:41) (18 - 18)  SpO2: 100% (10 Nov 2020 22:41) (98% - 100%)  Daily     Daily Weight in k.2 (2020 06:50)      PPSV2:  20-30 %  FAST: 7c    General: Elderly female, cachectic, ill-appearing, scratching skin at times but mittens on now  Mental Status: AOx1 (responds to her name, otherwise unable to gather)  HEENT: dmm, + temporal wasting  Lungs: clear  Cardiac: + s1 s2 rrr  GI: soft nt nd + bs  : incontinent  MSK/skin: contracted, sitting on legs, excoriations on limbs,  multiple areas of skin breakdown over bl hips, R knee, muscle and fat wasting throughout  Neuro: limited by confusion    LABS:                        9.7    7.47  )-----------( 229      ( 2020 10:18 )             32.1       Culture - Blood in AM (20 @ 08:38)    Specimen Source: .Blood None    Culture Results:   No growth to date.    Culture - Blood in AM (20 @ 08:38)    Specimen Source: .Blood None    Culture Results:   No growth to date.      Allergies    aspirin (Other (Severe))    Intolerances      MEDICATIONS  (STANDING):  dextrose 5% + sodium chloride 0.45% with potassium chloride 20 mEq/L 1000 milliLiter(s) (75 mL/Hr) IV Continuous <Continuous>  doxycycline IVPB 100 milliGRAM(s) IV Intermittent every 12 hours  heparin   Injectable 5000 Unit(s) SubCutaneous every 12 hours  metoprolol succinate ER 25 milliGRAM(s) Oral daily  piperacillin/tazobactam IVPB.. 3.375 Gram(s) IV Intermittent every 8 hours  QUEtiapine 25 milliGRAM(s) Oral at bedtime    MEDICATIONS  (PRN):  acetaminophen  IVPB .. 1000 milliGRAM(s) IV Intermittent once PRN Mild Pain (1 - 3), Moderate Pain (4 - 6), Severe Pain (7 - 10)  acetaminophen  IVPB .. 1000 milliGRAM(s) IV Intermittent once PRN Mild Pain (1 - 3), Moderate Pain (4 - 6), Severe Pain (7 - 10)  acetaminophen  IVPB .. 1000 milliGRAM(s) IV Intermittent once PRN Mild Pain (1 - 3), Moderate Pain (4 - 6), Severe Pain (7 - 10)

## 2020-11-11 NOTE — DISCHARGE NOTE NURSING/CASE MANAGEMENT/SOCIAL WORK - NSDCFUADDAPPT_GEN_ALL_CORE_FT
DCP Home with [facility] and [facility] CD PAP reinstated for 56hrs per week

## 2020-11-11 NOTE — PROGRESS NOTE ADULT - ASSESSMENT
89 y/o female with h/o syncope s/p PPM, CHF with left pleural effusion in the past, asthma, iron deficiency anemia was admitted on 11/2 for poorly healing hip ulcerations and increased confusion. She was noted with progressively worsening oral intake (previously would eat pureed food but last 1-2 weeks PTA, she stopped and wouldn't drink either), increased. confused, would not talk or respond> IN ER she was noted with high creatinine and b/l hip decubitus st IV Left worse than right. She received zosyn IV and vancomycin IV.     1. Sepsis with PRMI and GP coccobacilli. B/l hip decubitus ulcers. B/l hips acute on chronic OM with surrounding cellulitis. ARF improving.   -fever is down  -encephalopathy is improved  -noted with two different organisms in blood culture; the lab is having difficulty isolating the GP coccobacilli isolated in blood culture  -leukocytosis resolving  -renal function is improving  -s/p zosyn 3 gm IV q8h and doxycycline 100 mg IV q12h # 8-9  -tolerating abx well so far; no side effects noted  -local wound care  -repeat BC x 2 are negative to date  -palliative evaluation appreciated; on hospice care  -aspiration precautions  -no further abx therapy  -monitor temps  -f/u CBC  -supportive care  2. Other issues:   -care per medicine

## 2020-11-11 NOTE — PROGRESS NOTE ADULT - REASON FOR ADMISSION
not eating/confused

## 2020-11-11 NOTE — PROGRESS NOTE ADULT - SUBJECTIVE AND OBJECTIVE BOX
Date of service: 20 @ 12:22    Lying in bed in NAD  More alert  Dose not seem in pain    ROS: no fever or chills; poorly verbal    MEDICATIONS  (STANDING):  heparin   Injectable 5000 Unit(s) SubCutaneous every 12 hours  metoprolol succinate ER 25 milliGRAM(s) Oral daily  QUEtiapine 25 milliGRAM(s) Oral at bedtime    Vital Signs Last 24 Hrs  T(C): 36.4 (2020 11:03), Max: 36.4 (10 Nov 2020 22:41)  T(F): 97.5 (2020 11:03), Max: 97.5 (10 Nov 2020 22:41)  HR: 97 (10 Nov 2020 22:41) (94 - 97)  BP: 178/70 (:) (127/60 - 178/70)  BP(mean): --  RR: 18 (:) (18 - 18)  SpO2: 98% (:) (98% - 100%)     Physical exam:    Constitutional:  No acute distress  HEENT: NC/AT, EOMI, PERRLA, conjunctivae clear  Neck: supple; thyroid not palpable  Back: no tenderness  Respiratory: respiratory effort normal; clear to auscultation  Cardiovascular: S1S2 regular, no murmurs  Abdomen: soft, not tender, not distended, positive BS  Genitourinary: no suprapubic tenderness  Lymphatic: no LN palpable  Musculoskeletal: no muscle tenderness, no joint swelling or tenderness  Right hip decubitus ulcer with palpable bone, areas of necrosis, surrounding erythema; scant discharge  Large left hip decubitus ulcer with palpable bone and partial necrotic base  Extremities: 1+ pedal edema  Neurological/ Psychiatric: confusion, moving all extremities  Skin: no rashes; no palpable lesions    Labs: reviewed    Ferritin, Serum: 117 ng/mL (20 @ 21:11)  C-Reactive Protein, Serum: 26.74 mg/dL (20 @ 14:24)                        10.9   7.53  )-----------( 182      ( 2020 08:02 )             37.2         139  |  112<H>  |  28<H>  ----------------------------<  99  3.8   |  21<L>  |  1.34<H>    Ca    7.8<L>      2020 08:45    Ferritin, Serum: 117 ng/mL (20 @ 21:11)  C-Reactive Protein, Serum: 26.74 mg/dL (20 @ 14:24)                        11.0   15.93 )-----------( 166      ( 2020 08:11 )             36.0     11    154<H>  |  117<H>  |  69<H>  ----------------------------<  104<H>  3.9   |  29  |  3.23<H>    Ca    8.1<L>      2020 08:11  Phos  4.7       Mg     2.8         TPro  7.0  /  Alb  2.0<L>  /  TBili  0.7  /  DBili  x   /  AST  20  /  ALT  12  /  AlkPhos  105  11     LIVER FUNCTIONS - ( 2020 14:24 )  Alb: 2.0 g/dL / Pro: 7.0 gm/dL / ALK PHOS: 105 U/L / ALT: 12 U/L / AST: 20 U/L / GGT: x           Urinalysis Basic - ( 2020 14:24 )    Color: Yellow / Appearance: very cloudy / S.015 / pH: x  Gluc: x / Ketone: Negative  / Bili: Moderate / Urobili: 1 mg/dL   Blood: x / Protein: 30 mg/dL / Nitrite: Negative   Leuk Esterase: Negative / RBC: 3-5 /HPF / WBC 0-2   Sq Epi: x / Non Sq Epi: Moderate / Bacteria: Occasional      Culture - Blood (collected 2020 08:38)  Source: .Blood None  Preliminary Report (10 Nov 2020 13:02):    No growth to date.    Culture - Blood (collected 2020 08:38)  Source: .Blood None  Preliminary Report (10 Nov 2020 13:02):    No growth to date.    Culture - Urine (collected 2020 14:24)  Source: .Urine Clean Catch (Midstream)  Final Report (2020 14:18):    No growth    Culture - Blood (collected 2020 14:24)  Source: .Blood Blood-Peripheral  Gram Stain (2020 06:11):    Growth in anaerobic bottle: gram positive coccobacilli  Preliminary Report (2020 06:12):    Growth in anaerobic bottle: gram positive coccobacilli  Organism: Blood Culture PCR (2020 07:18)      -  Acinetobacter baumanii: Nondet      -  Candida albicans: Nondet      -  Candida glabrata: Nondet      -  Candida krusei: Nondet      -  Candida parapsilosis: Nondet      -  Candida tropicalis: Nondet      -  Coagulase negative Staphylococcus: Nondet      -  Enterobacter cloacae complex: Nondet      -  Enterococcus species: Nondet      -  Escherichia coli: Nondet      -  Haemophilus influenzae: Nondet      -  Klebsiella oxytoca: Nondet      -  Klebsiella pneumoniae: Nondet      -  Listeria monocytogenes: Nondet      -  Methicillin resistant Staphylococcus aureus (MRSA): Nondet      -  Multidrug (KPC pos) resistant organism: Nondet      -  Neisseria meningitidis: Nondet      -  Pseudomonas aeruginosa: Nondet      -  Serratia marcescens: Nondet      -  Staphylococcus aureus: Nondet      -  Streptococcus agalactiae (Group B): Nondet      -  Streptococcus pneumoniae: Nondet      -  Streptococcus pyogenes (Group A): Nondet      -  Streptococcus sp. (Not Grp A, B or S pneumoniae): Nondet      -  Vancomycin resistant Enterococcus sp.: Nondet      Method Type: PCR    Culture - Blood (collected 2020 14:24)  Source: .Blood Blood-Peripheral  Gram Stain (2020 11:00):    Growth in anaerobic bottle: Gram Negative Rods  Final Report (2020 09:12):    Growth in anaerobic bottle: Proteus mirabilis  Organism: Blood Culture PCR  Proteus mirabilis (2020 09:12)  Organism: Proteus mirabilis (2020 09:12)      -  Amikacin: S <=16      -  Ampicillin: R >16 These ampicillin results predict results for amoxicillin      -  Ampicillin/Sulbactam: S <=4/2 Enterobacter, Citrobacter, and Serratia may develop resistance during prolonged therapy (3-4 days)      -  Aztreonam: S <=4      -  Cefazolin: R >16 Enterobacter, Citrobacter, and Serratia may develop resistance during prolonged therapy (3-4 days)      -  Cefepime: S <=2      -  Cefoxitin: S <=8      -  Ceftriaxone: S <=1 Enterobacter, Citrobacter, and Serratia may develop resistance during prolonged therapy      -  Ciprofloxacin: S <=0.25      -  Ertapenem: S <=0.5      -  Gentamicin: S <=2      -  Levofloxacin: S <=0.5      -  Meropenem: S <=1      -  Piperacillin/Tazobactam: S <=8      -  Tobramycin: I 8      -  Trimethoprim/Sulfamethoxazole: R >      Method Type: MELLY      COVID-19 PCR: NotDetec (20 @ 15:05)    Radiology: all available radiological tests reviewed    < from: CT Abdomen and Pelvis No Cont (20 @ 16:12) >  Deep bilateral hip decubitus ulcers with exposure of the underlying greater trochanters. No definite underlying bony erosion or destruction. Small amount of gas in the left greater trochanteric bursa suggestive of bursitis.    < end of copied text >      Advanced directives addressed: full resuscitation

## 2020-11-11 NOTE — PROGRESS NOTE ADULT - ASSESSMENT
89y old Female coming from home with hx of Dementia (FAST 7c), syncope s/p PPM, CHF with left pleural effusion in the past, asthma, Iron deficiency anemia, admitted 11/2 for progressively worsening oral intake (previously would eat pureed food but last 1-2 weeks stopped and wouldn't drink either), increasing confusion, and decreased responsiveness. Found here to have leukocytosis (sepsis, with elevated lactate 4.3), FLORA (Cr>3), negative CXR, but CT abd/pelvis showing deep bl hip decubiti. Labs later showing GNR in blood cx. Palliative Care consulted to assist with establishing GOC.     1) Pain  - pt moaning at times  - c/w doses of IV Tylenol in hopes of avoiding any negative effects of already fragile mentation  - dc'd IV dilaudid as moaning is a chronic issue as per family and pt doing just fine with IV Tylenol. We would thus prefer to keep pain regimen conservative so as not to compromise already tenuous mental state    2) Pruritis  - as per family this is a chronic issue, being followed outpt by allergist. Family bringing in creme that works for pt at home  - predates opioid admin  - agree with conservative measures including mittens    3) Sepsis: Decubitus   - bl decubitus ulcers on hips and other areas of skin breakdown  - lactate improved with IVF and abx  - blood cx with GNR result as of today  - ID notes appreciated- suggesting IV abx and inquiry into possible surgical debridement  - wound care notes also appreciated- documenting several areas of skin breakdown including bl stage 4 pressure injuries, R knee stage 3, sacral stage 3, all with poor chance of healing due to nutritional status and comorbidities. 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 to be chronic  - according to notes, considering age, comorbidities and anemia, holding off on anticoagulation due to risk    6) Anemia  - chronic iron deficiency  - s/p 2 U PRBCs with appropriate improvement    7) AMS/Dementia  - underlying hx of dementia, with superimposed hypoactive delirium from acute metabolic derangements  - fall and aspiration precautions  - daughter suggested hx of poor sleep and calling out  - able to swallow after swallow eval thus added Seroquel 25 qhs; if unable to swallow can give low dose ativan 0.25 mg IV q6h prn    8) Debility  - PPS<40%  - dependent for ADLs  - nutrition notes appreciated- severe muscle and fat wasting, severe protein calorie malnutrition  - speech and swallow eval appreciated- overt aspiration with thin liquids, dysphagia 1, recommends against peg    9) Prognosis  - poor  - in setting of advanced dementia, advanced age, dependence for ADLs, FTT, multiple areas of skin breakdown, severe protein calorie malnutrition, PPS<40%, albumin 2.0, pt would fit criteria for hospice. Family on board with this and dc plan is for home with hospice after all acute interventions and when pt deemed medically stable    10) GOC/Advanced Directives  - pt does not have capacity for decision making  - no HCP on file (what is in [employer] is an incomplete form and family confirms no official proxy): thus children serving as surrogate decision makers: Eric Bruce [phone]  - MOLST form completed 11/4- DNR, limited, DNI, do not send, no feeding tube, trial of IVF, use abx  - GOC conversation held 11/4- plan for all interventions including possible surgery to get pt well while here, but abovementioned limits now in place, and ultimate plan for home hospice after pt fully stabilized. See GOC note for additional details. As per notes pt likely to be dc'd today with home hospice.     Thank you for including us in Ms. Salazar's care. Will continue to follow with you.    Esvin Huston MD  Palliative Care Attending

## 2020-11-12 ENCOUNTER — NON-APPOINTMENT (OUTPATIENT)
Age: 85
End: 2020-11-12

## 2020-11-14 LAB
CULTURE RESULTS: SIGNIFICANT CHANGE UP
CULTURE RESULTS: SIGNIFICANT CHANGE UP
SPECIMEN SOURCE: SIGNIFICANT CHANGE UP
SPECIMEN SOURCE: SIGNIFICANT CHANGE UP

## 2020-11-16 DIAGNOSIS — E87.0 HYPEROSMOLALITY AND HYPERNATREMIA: ICD-10-CM

## 2020-11-16 DIAGNOSIS — G93.41 METABOLIC ENCEPHALOPATHY: ICD-10-CM

## 2020-11-16 DIAGNOSIS — B96.4 PROTEUS (MIRABILIS) (MORGANII) AS THE CAUSE OF DISEASES CLASSIFIED ELSEWHERE: ICD-10-CM

## 2020-11-16 DIAGNOSIS — M86.68 OTHER CHRONIC OSTEOMYELITIS, OTHER SITE: ICD-10-CM

## 2020-11-16 DIAGNOSIS — I82.541 CHRONIC EMBOLISM AND THROMBOSIS OF RIGHT TIBIAL VEIN: ICD-10-CM

## 2020-11-16 DIAGNOSIS — A41.89 OTHER SPECIFIED SEPSIS: ICD-10-CM

## 2020-11-16 DIAGNOSIS — R53.2 FUNCTIONAL QUADRIPLEGIA: ICD-10-CM

## 2020-11-16 DIAGNOSIS — L03.116 CELLULITIS OF LEFT LOWER LIMB: ICD-10-CM

## 2020-11-16 DIAGNOSIS — J45.909 UNSPECIFIED ASTHMA, UNCOMPLICATED: ICD-10-CM

## 2020-11-16 DIAGNOSIS — A41.9 SEPSIS, UNSPECIFIED ORGANISM: ICD-10-CM

## 2020-11-16 DIAGNOSIS — Z51.5 ENCOUNTER FOR PALLIATIVE CARE: ICD-10-CM

## 2020-11-16 DIAGNOSIS — Z88.8 ALLERGY STATUS TO OTHER DRUGS, MEDICAMENTS AND BIOLOGICAL SUBSTANCES STATUS: ICD-10-CM

## 2020-11-16 DIAGNOSIS — R13.10 DYSPHAGIA, UNSPECIFIED: ICD-10-CM

## 2020-11-16 DIAGNOSIS — E43 UNSPECIFIED SEVERE PROTEIN-CALORIE MALNUTRITION: ICD-10-CM

## 2020-11-16 DIAGNOSIS — R62.7 ADULT FAILURE TO THRIVE: ICD-10-CM

## 2020-11-16 DIAGNOSIS — M86.18 OTHER ACUTE OSTEOMYELITIS, OTHER SITE: ICD-10-CM

## 2020-11-16 DIAGNOSIS — N17.0 ACUTE KIDNEY FAILURE WITH TUBULAR NECROSIS: ICD-10-CM

## 2020-11-16 DIAGNOSIS — D50.9 IRON DEFICIENCY ANEMIA, UNSPECIFIED: ICD-10-CM

## 2020-11-16 DIAGNOSIS — L89.214 PRESSURE ULCER OF RIGHT HIP, STAGE 4: ICD-10-CM

## 2020-11-16 DIAGNOSIS — L03.115 CELLULITIS OF RIGHT LOWER LIMB: ICD-10-CM

## 2020-11-16 DIAGNOSIS — I82.511 CHRONIC EMBOLISM AND THROMBOSIS OF RIGHT FEMORAL VEIN: ICD-10-CM

## 2020-11-16 DIAGNOSIS — L89.224 PRESSURE ULCER OF LEFT HIP, STAGE 4: ICD-10-CM

## 2020-11-16 DIAGNOSIS — F03.90 UNSPECIFIED DEMENTIA WITHOUT BEHAVIORAL DISTURBANCE: ICD-10-CM

## 2020-12-01 LAB
CULTURE RESULTS: SIGNIFICANT CHANGE UP
ORGANISM # SPEC MICROSCOPIC CNT: SIGNIFICANT CHANGE UP
ORGANISM # SPEC MICROSCOPIC CNT: SIGNIFICANT CHANGE UP
SPECIMEN SOURCE: SIGNIFICANT CHANGE UP

## 2023-04-21 NOTE — ED PROVIDER NOTE - NS_BEDUNITTYPES_ED_ALL_ED
MED/SURG
Pt is 89Y F, pmhx CKD, afib, CHF, HTN, c/o urinary retention for 24 hours. Pt lives at [facility], pt states she has unable ot urinate for 24 hours, pt endorses being on prophylactic abx per PCP for two weeks for recurring UTI, pt recently at hospital for ANNA 1 month ago, pt endorses some pain when urinating, feeling urge to urinate but unable to go, pt denies fever or chills, pt bladder scanned per MD chan, pt 77cc of urine, pt denies any other symptoms, pt is A&Ox3, ambulates with walker at home, updated on plan of care

## 2023-04-25 NOTE — ED ADULT NURSE NOTE - NS ED NOTE ABUSE RESPONSE YN
Unable to assess due to medical condition Detail Level: Simple Additional Notes: Patient consent was obtained to proceed with the visit and recommended plan of care after discussion of all risks and benefits, including the risks of COVID-19 exposure.

## 2023-09-19 NOTE — CHART NOTE - NSCHARTNOTEFT_GEN_A_CORE
Assessment:     *89 y.o. female with PMHx of syncope s/p PPM, CHF with left pleural effusion in the past, asthma, Iron deficiency anemia p/w progressively worsening oral intake (previously would eat pureed food but last 1-2 weeks stopped and wouldn't drink either), became confused, would not talk or respond BIBA from home and found to be in FLORA, sepsis with BL infected hip decubitus st IV Left worse than right based on CT, Failure to thrive, anemia, RLE edema and metabolic encephalopathy.    *pt seen lying in bed, appeared comfortable. Pt is a poor historian and unable to provide information. Discussed pt w/ nursing assistant who reports pt is not taking any po and will push away and yell when feeding pt is attempted. Pt is being followed by palliative care noting GOC regarding nutrition support, no wish for TF. Pt is currently receiving ensure enlive NT TID. Recommend provide po as desired and when pt able to safely tolerate po.     *(+) BM 10/11, no note of n/v    *noted pt is planned for d/c to home hospice today.     Recommendations:    1. maintain dysphagia diet and current ONS   2. provide po as desired and when pt able to safely tolerate.   3. maintain aspiration precautions.       Diet Prescription: Diet, Dysphagia 1 Pureed-Nectar Consistency Fluid (11-03-20 @ 17:36)      Wt Hx:  Height (cm): 149.9 (11-02-20 @ 15:43)  Weight (kg): 37 (11-03-20 @ 00:14)  BMI (kg/m2): 16.5 (11-03-20 @ 00:14)        Estimated Needs:   [x] no change since previous assessment  based on wt:    Estimated Energy Needs:  · Weight (lbs)  81.5 lb  · Weight (kg)  37 kg  · Enter From (erlin/kg)  30  · Enter To (erlin/kg)  35  · Calculated From (erlin/kg)  1110  · Calculated To (erlin/kg)  1295    Estimated Protein Needs:  · Weight (lbs)  81.5 lb  · Weight (kg)  37 kg  · Enter From (g/kg)  1.8  · Enter To (g/kg)  2  · Calculated From (g/kg)  66.6  · Calculated To (g/kg)  74    Estimated Fluid Needs:  · Weight (lbs)  81.5 lb  · Weight (kg)  37 kg  · Enter From (ml/kg)  30  · Enter To (ml/kg)  35  · Calculated From (ml/kg)  1110  · Calculated To (ml/kg)  1295      Pertinent Labs: 11-09 Na141 mmol/L Glu 112 mg/dL<H> K+ 4.1 mmol/L Cr  1.14 mg/dL BUN 19 mg/dL 11-03 PAB 6 mg/dL<L>     CAPILLARY BLOOD GLUCOSE          Skin: dario score = 9; noted edema 2+ left hip, 3+ right arm and hand; 4+ left/right feet. Noted pressure ulcers: unstageable x 2: left and right hips, stage 3 x 2: sacrum and right knee. Noted unlikely to heal/improve 2/2 poor nutrition status.           Monitoring and Evaluation:   [x] PO intake/Nutr support infusion [ x ] Tolerance to Nutr [ x ] weights [ x ] labs[ x ] follow up per protocol  [ ] other: Methotrexate Counseling:  Patient counseled regarding adverse effects of methotrexate including but not limited to nausea, vomiting, abnormalities in liver function tests. Patients may develop mouth sores, rash, diarrhea, and abnormalities in blood counts. The patient understands that monitoring is required including LFT's and blood counts.  There is a rare possibility of scarring of the liver and lung problems that can occur when taking methotrexate. Persistent nausea, loss of appetite, pale stools, dark urine, cough, and shortness of breath should be reported immediately. Patient advised to discontinue methotrexate treatment at least three months before attempting to become pregnant.  I discussed the need for folate supplements while taking methotrexate.  These supplements can decrease side effects during methotrexate treatment. The patient verbalized understanding of the proper use and possible adverse effects of methotrexate.  All of the patient's questions and concerns were addressed.

## 2023-12-22 NOTE — CHART NOTE - FINDINGS AS BASED ON:
Chief Complaint   Patient presents with    New Patient     New patient to our clinic    Palpitations     Discuss irregular pulse, over the last few weeks when her pulse is elevated she feels a head pressure, flutter in her chest, and becomes warm, she has noticed she has more fatigue and easy exertion, some chest discomfort, pulse has gone up to 154     Pre-visit Screening:  Immunizations:  up to date  Colonoscopy:  is up to date  Mammogram: is up to date  Asthma Action Test/Plan:  NA  PHQ9:  NA  GAD7:  NA  Questioned patient about current smoking habits Pt. has never smoked.  Ok to leave detailed message on voice mail for today's visit only Yes, phone # [phone]     Comprehensive nutrition assessment and consultation/ concerns/Food acceptance and intake status from observations by staff

## 2024-05-23 NOTE — ED PROVIDER NOTE - PRO INTERPRETER NEED 2
Detail Level: Zone Render In Strict Bullet Format?: No Initiate Treatment: ketoconazole 2 % shampoo \\nSig: Shampoo face, ears, and scalp 3-4 times a week, let shampoo sit 5-10min prior to rinsing.\\n\\nketoconazole 2 % topical cream TP Frequency: BID Sig: Apply 1-2 times a day to eyebrows, ears and affected areas as needed for flares. Other

## 2024-10-28 NOTE — PROCEDURE NOTE - INTERROGATION NOTE: UNDERLYING RHYTHM
Quality 226: Preventive Care And Screening: Tobacco Use: Screening And Cessation Intervention: Patient screened for tobacco use and is an ex/non-smoker Detail Level: Detailed Quality 130: Documentation Of Current Medications In The Medical Record: Current Medications Documented sinus with frequent atrial ectopy

## 2025-03-25 NOTE — SWALLOW BEDSIDE ASSESSMENT ADULT - ASR SWALLOW LABIAL MOBILITY
[FreeTextEntry1] : Anticipatory guidance and parent education given. Take oral steroid as prescribed. Recommend using mist from a humidifier.  Allow the child to breathe cool air during the night by opening a window or door.  Fever can be treated with an over-the-counter medication such as acetaminophen or ibuprofen.  Coughing can be treated with warm, clear fluids to loosen mucus on the vocal cords. Warm water, apple juice, or lemonade is safe for children older than four months.  Frozen juice popsicles also can be given. Keep the child's head elevated. If the child's stridor does not improve contact health care provider immediately. Follow up as needed for persistent or worsening symptoms.
Unable to formally assess due to altered cognition with reduced active participation/decreased compliance.